# Patient Record
Sex: MALE | Race: WHITE | Employment: OTHER | URBAN - METROPOLITAN AREA
[De-identification: names, ages, dates, MRNs, and addresses within clinical notes are randomized per-mention and may not be internally consistent; named-entity substitution may affect disease eponyms.]

---

## 2017-02-20 ENCOUNTER — OFFICE VISIT (OUTPATIENT)
Dept: CARDIOLOGY CLINIC | Age: 82
End: 2017-02-20

## 2017-02-20 VITALS
SYSTOLIC BLOOD PRESSURE: 120 MMHG | OXYGEN SATURATION: 96 % | DIASTOLIC BLOOD PRESSURE: 74 MMHG | WEIGHT: 189 LBS | HEART RATE: 55 BPM | BODY MASS INDEX: 30.37 KG/M2 | HEIGHT: 66 IN

## 2017-02-20 DIAGNOSIS — N18.2 CRF (CHRONIC RENAL FAILURE), STAGE 2 (MILD): Chronic | ICD-10-CM

## 2017-02-20 DIAGNOSIS — I25.10 CORONARY ARTERY DISEASE INVOLVING NATIVE CORONARY ARTERY OF NATIVE HEART WITHOUT ANGINA PECTORIS: Primary | Chronic | ICD-10-CM

## 2017-02-20 DIAGNOSIS — I10 ESSENTIAL HYPERTENSION: Chronic | ICD-10-CM

## 2017-02-20 DIAGNOSIS — I42.9 CARDIOMYOPATHY (HCC): ICD-10-CM

## 2017-02-20 DIAGNOSIS — I35.0 AORTIC STENOSIS, MILD: ICD-10-CM

## 2017-02-20 DIAGNOSIS — Z98.61 S/P PTCA (PERCUTANEOUS TRANSLUMINAL CORONARY ANGIOPLASTY): Chronic | ICD-10-CM

## 2017-02-20 DIAGNOSIS — I71.21 ASCENDING AORTIC ANEURYSM: Chronic | ICD-10-CM

## 2017-02-20 DIAGNOSIS — E78.5 HYPERLIPIDEMIA, UNSPECIFIED HYPERLIPIDEMIA TYPE: Chronic | ICD-10-CM

## 2017-02-20 PROCEDURE — 99214 OFFICE O/P EST MOD 30 MIN: CPT | Performed by: INTERNAL MEDICINE

## 2017-03-06 ENCOUNTER — TELEPHONE (OUTPATIENT)
Dept: CARDIOLOGY CLINIC | Age: 82
End: 2017-03-06

## 2017-03-08 ENCOUNTER — OFFICE VISIT (OUTPATIENT)
Dept: CARDIOLOGY CLINIC | Age: 82
End: 2017-03-08

## 2017-03-08 VITALS
WEIGHT: 188 LBS | DIASTOLIC BLOOD PRESSURE: 84 MMHG | SYSTOLIC BLOOD PRESSURE: 134 MMHG | HEIGHT: 66 IN | BODY MASS INDEX: 30.22 KG/M2 | HEART RATE: 58 BPM

## 2017-03-08 DIAGNOSIS — I51.89 LEFT VENTRICULAR SYSTOLIC DYSFUNCTION, NYHA CLASS 3: Primary | ICD-10-CM

## 2017-03-08 PROCEDURE — 99214 OFFICE O/P EST MOD 30 MIN: CPT | Performed by: INTERNAL MEDICINE

## 2017-03-10 ENCOUNTER — TELEPHONE (OUTPATIENT)
Dept: CARDIOLOGY CLINIC | Age: 82
End: 2017-03-10

## 2017-03-12 ASSESSMENT — ENCOUNTER SYMPTOMS
EYE PAIN: 0
BACK PAIN: 0
NAUSEA: 0
BLOOD IN STOOL: 0
WHEEZING: 0
ABDOMINAL PAIN: 0
CHEST TIGHTNESS: 0
VOMITING: 0
PHOTOPHOBIA: 0
COLOR CHANGE: 0
SHORTNESS OF BREATH: 1
COUGH: 0

## 2017-03-29 ENCOUNTER — NURSE ONLY (OUTPATIENT)
Dept: CARDIOLOGY CLINIC | Age: 82
End: 2017-03-29

## 2017-03-29 VITALS
WEIGHT: 189 LBS | SYSTOLIC BLOOD PRESSURE: 136 MMHG | BODY MASS INDEX: 30.37 KG/M2 | DIASTOLIC BLOOD PRESSURE: 74 MMHG | HEIGHT: 66 IN | HEART RATE: 66 BPM

## 2017-03-29 DIAGNOSIS — Z95.810 S/P ICD (INTERNAL CARDIAC DEFIBRILLATOR) PROCEDURE: Primary | ICD-10-CM

## 2017-04-26 ENCOUNTER — TELEPHONE (OUTPATIENT)
Dept: CARDIOLOGY CLINIC | Age: 82
End: 2017-04-26

## 2017-04-27 ENCOUNTER — TELEPHONE (OUTPATIENT)
Dept: CARDIOLOGY CLINIC | Age: 82
End: 2017-04-27

## 2017-05-05 ENCOUNTER — TELEPHONE (OUTPATIENT)
Dept: CARDIOLOGY CLINIC | Age: 82
End: 2017-05-05

## 2017-05-08 ENCOUNTER — TELEPHONE (OUTPATIENT)
Dept: FAMILY MEDICINE CLINIC | Age: 82
End: 2017-05-08

## 2017-05-09 RX ORDER — CARVEDILOL 6.25 MG/1
6.25 TABLET ORAL 2 TIMES DAILY WITH MEALS
Qty: 60 TABLET | Refills: 6 | Status: SHIPPED | OUTPATIENT
Start: 2017-05-09 | End: 2017-06-14 | Stop reason: SDUPTHER

## 2017-05-16 ENCOUNTER — OFFICE VISIT (OUTPATIENT)
Dept: FAMILY MEDICINE CLINIC | Age: 82
End: 2017-05-16

## 2017-05-16 VITALS
TEMPERATURE: 96.8 F | OXYGEN SATURATION: 96 % | DIASTOLIC BLOOD PRESSURE: 82 MMHG | SYSTOLIC BLOOD PRESSURE: 130 MMHG | BODY MASS INDEX: 29.83 KG/M2 | WEIGHT: 184.8 LBS | HEART RATE: 94 BPM

## 2017-05-16 DIAGNOSIS — I49.5 SSS (SICK SINUS SYNDROME) (HCC): ICD-10-CM

## 2017-05-16 DIAGNOSIS — I25.10 CORONARY ARTERY DISEASE INVOLVING NATIVE CORONARY ARTERY OF NATIVE HEART WITHOUT ANGINA PECTORIS: Primary | ICD-10-CM

## 2017-05-16 DIAGNOSIS — Z09 HOSPITAL DISCHARGE FOLLOW-UP: ICD-10-CM

## 2017-05-16 DIAGNOSIS — I48.0 PAF (PAROXYSMAL ATRIAL FIBRILLATION) (HCC): ICD-10-CM

## 2017-05-16 PROCEDURE — 99213 OFFICE O/P EST LOW 20 MIN: CPT | Performed by: FAMILY MEDICINE

## 2017-06-14 ENCOUNTER — TELEPHONE (OUTPATIENT)
Dept: CARDIOLOGY CLINIC | Age: 82
End: 2017-06-14

## 2017-06-14 ENCOUNTER — OFFICE VISIT (OUTPATIENT)
Dept: CARDIOLOGY CLINIC | Age: 82
End: 2017-06-14

## 2017-06-14 VITALS
HEIGHT: 66 IN | BODY MASS INDEX: 29.09 KG/M2 | DIASTOLIC BLOOD PRESSURE: 82 MMHG | SYSTOLIC BLOOD PRESSURE: 120 MMHG | WEIGHT: 181 LBS

## 2017-06-14 DIAGNOSIS — Z95.810 S/P ICD (INTERNAL CARDIAC DEFIBRILLATOR) PROCEDURE: ICD-10-CM

## 2017-06-14 DIAGNOSIS — N18.30 CHRONIC RENAL FAILURE, STAGE 3 (MODERATE) (HCC): ICD-10-CM

## 2017-06-14 DIAGNOSIS — I51.9 SEVERE LEFT VENTRICULAR SYSTOLIC DYSFUNCTION: Chronic | ICD-10-CM

## 2017-06-14 DIAGNOSIS — E78.5 HYPERLIPIDEMIA, UNSPECIFIED HYPERLIPIDEMIA TYPE: Chronic | ICD-10-CM

## 2017-06-14 DIAGNOSIS — I48.0 PAF (PAROXYSMAL ATRIAL FIBRILLATION) (HCC): ICD-10-CM

## 2017-06-14 DIAGNOSIS — I10 ESSENTIAL HYPERTENSION: Chronic | ICD-10-CM

## 2017-06-14 DIAGNOSIS — I25.10 CORONARY ARTERY DISEASE INVOLVING NATIVE CORONARY ARTERY OF NATIVE HEART WITHOUT ANGINA PECTORIS: Primary | ICD-10-CM

## 2017-06-14 DIAGNOSIS — Z98.61 S/P PTCA (PERCUTANEOUS TRANSLUMINAL CORONARY ANGIOPLASTY): Chronic | ICD-10-CM

## 2017-06-14 PROCEDURE — 99214 OFFICE O/P EST MOD 30 MIN: CPT | Performed by: INTERNAL MEDICINE

## 2017-06-14 RX ORDER — CARVEDILOL 6.25 MG/1
6.25 TABLET ORAL 2 TIMES DAILY WITH MEALS
Qty: 60 TABLET | Refills: 6 | Status: SHIPPED | OUTPATIENT
Start: 2017-06-14 | End: 2018-02-19 | Stop reason: ALTCHOICE

## 2017-06-14 RX ORDER — FUROSEMIDE 20 MG/1
20 TABLET ORAL PRN
Qty: 60 TABLET | Refills: 6 | Status: ON HOLD | OUTPATIENT
Start: 2017-06-14 | End: 2017-12-13

## 2017-06-14 RX ORDER — FUROSEMIDE 20 MG/1
TABLET ORAL
Qty: 40 TABLET | Refills: 0 | Status: CANCELLED | OUTPATIENT
Start: 2017-06-14

## 2017-06-14 RX ORDER — FUROSEMIDE 20 MG/1
20 TABLET ORAL PRN
COMMUNITY
End: 2017-06-14 | Stop reason: SDUPTHER

## 2017-06-15 ENCOUNTER — TELEPHONE (OUTPATIENT)
Dept: CARDIOLOGY CLINIC | Age: 82
End: 2017-06-15

## 2017-06-22 ENCOUNTER — TELEPHONE (OUTPATIENT)
Dept: CARDIOLOGY CLINIC | Age: 82
End: 2017-06-22

## 2017-06-29 ENCOUNTER — TELEPHONE (OUTPATIENT)
Dept: CARDIOLOGY CLINIC | Age: 82
End: 2017-06-29

## 2017-06-30 RX ORDER — ATORVASTATIN CALCIUM 80 MG/1
80 TABLET, FILM COATED ORAL DAILY
Qty: 90 TABLET | Refills: 3 | Status: ON HOLD | OUTPATIENT
Start: 2017-06-30 | End: 2017-12-13 | Stop reason: HOSPADM

## 2017-06-30 RX ORDER — ATORVASTATIN CALCIUM 80 MG/1
80 TABLET, FILM COATED ORAL NIGHTLY
Qty: 90 TABLET | Refills: 3 | Status: CANCELLED | OUTPATIENT
Start: 2017-06-30

## 2017-07-18 RX ORDER — ISOSORBIDE MONONITRATE 60 MG/1
60 TABLET, EXTENDED RELEASE ORAL DAILY
Qty: 30 TABLET | Refills: 6 | Status: ON HOLD | OUTPATIENT
Start: 2017-07-18 | End: 2018-02-13 | Stop reason: HOSPADM

## 2017-07-18 RX ORDER — LISINOPRIL 2.5 MG/1
2.5 TABLET ORAL DAILY
Qty: 30 TABLET | Refills: 6 | Status: ON HOLD | OUTPATIENT
Start: 2017-07-18 | End: 2018-02-12 | Stop reason: ALTCHOICE

## 2017-09-06 ENCOUNTER — OFFICE VISIT (OUTPATIENT)
Dept: CARDIOLOGY CLINIC | Age: 82
End: 2017-09-06

## 2017-09-06 VITALS
HEART RATE: 80 BPM | WEIGHT: 184.4 LBS | BODY MASS INDEX: 29.63 KG/M2 | HEIGHT: 66 IN | DIASTOLIC BLOOD PRESSURE: 86 MMHG | SYSTOLIC BLOOD PRESSURE: 130 MMHG

## 2017-09-06 DIAGNOSIS — E78.5 HYPERLIPIDEMIA, UNSPECIFIED HYPERLIPIDEMIA TYPE: Chronic | ICD-10-CM

## 2017-09-06 DIAGNOSIS — I71.21 ASCENDING AORTIC ANEURYSM: Chronic | ICD-10-CM

## 2017-09-06 DIAGNOSIS — I10 ESSENTIAL HYPERTENSION: Chronic | ICD-10-CM

## 2017-09-06 DIAGNOSIS — Z95.810 BIVENTRICULAR AUTOMATIC IMPLANTABLE CARDIOVERTER DEFIBRILLATOR IN SITU: ICD-10-CM

## 2017-09-06 DIAGNOSIS — I51.9 SEVERE LEFT VENTRICULAR SYSTOLIC DYSFUNCTION: Chronic | ICD-10-CM

## 2017-09-06 DIAGNOSIS — N28.9 RENAL INSUFFICIENCY: ICD-10-CM

## 2017-09-06 DIAGNOSIS — I25.10 CORONARY ARTERY DISEASE INVOLVING NATIVE CORONARY ARTERY OF NATIVE HEART WITHOUT ANGINA PECTORIS: Primary | ICD-10-CM

## 2017-09-06 DIAGNOSIS — Z98.61 S/P PTCA (PERCUTANEOUS TRANSLUMINAL CORONARY ANGIOPLASTY): Chronic | ICD-10-CM

## 2017-09-06 DIAGNOSIS — I48.0 PAF (PAROXYSMAL ATRIAL FIBRILLATION) (HCC): ICD-10-CM

## 2017-09-06 PROCEDURE — 99214 OFFICE O/P EST MOD 30 MIN: CPT | Performed by: INTERNAL MEDICINE

## 2017-09-06 RX ORDER — OMEPRAZOLE MAGNESIUM 20 MG
CAPSULE,DELAYED RELEASE (ENTERIC COATED) ORAL
Refills: 3 | COMMUNITY
Start: 2017-08-02 | End: 2017-09-06 | Stop reason: SDUPTHER

## 2017-09-06 RX ORDER — PANTOPRAZOLE SODIUM 40 MG/1
TABLET, DELAYED RELEASE ORAL
Refills: 3 | COMMUNITY
Start: 2017-08-02 | End: 2017-09-06 | Stop reason: SDUPTHER

## 2017-09-07 RX ORDER — OMEPRAZOLE MAGNESIUM 20 MG
CAPSULE,DELAYED RELEASE (ENTERIC COATED) ORAL
Qty: 30 TABLET | Refills: 5 | Status: ON HOLD | OUTPATIENT
Start: 2017-09-07 | End: 2018-12-01

## 2017-09-07 RX ORDER — PANTOPRAZOLE SODIUM 40 MG/1
TABLET, DELAYED RELEASE ORAL
Qty: 30 TABLET | Refills: 5 | Status: SHIPPED | OUTPATIENT
Start: 2017-09-07 | End: 2018-05-25 | Stop reason: ALTCHOICE

## 2017-09-18 ENCOUNTER — PROCEDURE VISIT (OUTPATIENT)
Dept: CARDIOLOGY CLINIC | Age: 82
End: 2017-09-18

## 2017-09-18 DIAGNOSIS — Z95.810 BIVENTRICULAR IMPLANTABLE CARDIOVERTER-DEFIBRILLATOR IN SITU: Primary | ICD-10-CM

## 2017-09-18 PROCEDURE — 93296 REM INTERROG EVL PM/IDS: CPT | Performed by: INTERNAL MEDICINE

## 2017-09-18 PROCEDURE — 93297 REM INTERROG DEV EVAL ICPMS: CPT | Performed by: INTERNAL MEDICINE

## 2017-09-18 PROCEDURE — 93295 DEV INTERROG REMOTE 1/2/MLT: CPT | Performed by: INTERNAL MEDICINE

## 2017-10-23 RX ORDER — HYDRALAZINE HYDROCHLORIDE 25 MG/1
25 TABLET, FILM COATED ORAL EVERY 12 HOURS SCHEDULED
Qty: 90 TABLET | Refills: 3 | Status: ON HOLD | OUTPATIENT
Start: 2017-10-23 | End: 2018-02-13 | Stop reason: HOSPADM

## 2017-12-11 PROBLEM — I50.43 CHF (CONGESTIVE HEART FAILURE), NYHA CLASS I, ACUTE ON CHRONIC, COMBINED (HCC): Status: ACTIVE | Noted: 2017-12-11

## 2017-12-14 ENCOUNTER — OFFICE VISIT (OUTPATIENT)
Dept: FAMILY MEDICINE CLINIC | Age: 82
End: 2017-12-14

## 2017-12-14 VITALS
OXYGEN SATURATION: 92 % | SYSTOLIC BLOOD PRESSURE: 102 MMHG | BODY MASS INDEX: 28.45 KG/M2 | HEART RATE: 69 BPM | DIASTOLIC BLOOD PRESSURE: 82 MMHG | HEIGHT: 66 IN | WEIGHT: 177 LBS

## 2017-12-14 DIAGNOSIS — R09.89 CARDIAC LV EJECTION FRACTION 10-20%: ICD-10-CM

## 2017-12-14 DIAGNOSIS — I50.43 CHF (CONGESTIVE HEART FAILURE), NYHA CLASS I, ACUTE ON CHRONIC, COMBINED (HCC): Primary | ICD-10-CM

## 2017-12-14 DIAGNOSIS — N18.30 CKD (CHRONIC KIDNEY DISEASE) STAGE 3, GFR 30-59 ML/MIN (HCC): ICD-10-CM

## 2017-12-14 DIAGNOSIS — F51.01 PRIMARY INSOMNIA: ICD-10-CM

## 2017-12-14 DIAGNOSIS — Z91.81 AT HIGH RISK FOR FALLS: ICD-10-CM

## 2017-12-14 DIAGNOSIS — Z09 HOSPITAL DISCHARGE FOLLOW-UP: ICD-10-CM

## 2017-12-14 PROCEDURE — 99496 TRANSJ CARE MGMT HIGH F2F 7D: CPT | Performed by: FAMILY MEDICINE

## 2017-12-14 RX ORDER — TRAZODONE HYDROCHLORIDE 50 MG/1
50 TABLET ORAL NIGHTLY
Qty: 30 TABLET | Refills: 5 | Status: SHIPPED | OUTPATIENT
Start: 2017-12-14 | End: 2018-02-19

## 2017-12-15 ASSESSMENT — PATIENT HEALTH QUESTIONNAIRE - PHQ9
1. LITTLE INTEREST OR PLEASURE IN DOING THINGS: 0
SUM OF ALL RESPONSES TO PHQ9 QUESTIONS 1 & 2: 0
2. FEELING DOWN, DEPRESSED OR HOPELESS: 0
SUM OF ALL RESPONSES TO PHQ QUESTIONS 1-9: 0

## 2017-12-15 NOTE — PROGRESS NOTES
Post-Discharge Transitional Care Management Services      Trace Orozco   YOB: 1933    Date of Visit:  12/14/2017  30 Day Post-Discharge Date: 1/12/18    Allergies   Allergen Reactions    Pcn [Penicillins] Hives    Plavix [Clopidogrel]      Blood in stool     Outpatient Prescriptions Marked as Taking for the 12/14/17 encounter (Office Visit) with Saida Combs MD   Medication Sig Dispense Refill    traZODone (DESYREL) 50 MG tablet Take 1 tablet by mouth nightly 30 tablet 5         Vitals:    12/14/17 1500   BP: 102/82   Site: Left Arm   Position: Sitting   Cuff Size: Medium Adult   Pulse: 69   SpO2: 92%   Weight: 177 lb (80.3 kg)   Height: 5' 6\" (1.676 m)     Body mass index is 28.57 kg/m². Wt Readings from Last 3 Encounters:   12/14/17 177 lb (80.3 kg)   12/13/17 169 lb 14.4 oz (77.1 kg)   09/06/17 184 lb 6.4 oz (83.6 kg)     BP Readings from Last 3 Encounters:   12/14/17 102/82   12/13/17 120/80   09/06/17 130/86        Patient was admitted to [facility] from 12/11/17 to 12/13/17 for NSTEMI. Inpatient course: Discharge summary reviewed- see chart. Current status: improved    Review of Systems:  A comprehensive review of systems was negative except for what was noted in the HPI.     Physical Exam:  General Appearance: alert and oriented to person, place and time, well developed and well- nourished, in no acute distress  Skin: warm and dry, no rash or erythema  Head: normocephalic and atraumatic  Eyes: pupils equal, round, and reactive to light, extraocular eye movements intact, conjunctivae normal  ENT: tympanic membrane, external ear and ear canal normal bilaterally, nose without deformity, nasal mucosa and turbinates normal without polyps  Neck: supple and non-tender without mass, no thyromegaly or thyroid nodules, no cervical lymphadenopathy  Pulmonary/Chest: clear to auscultation bilaterally- no wheezes, rales or rhonchi, normal air movement, no respiratory

## 2017-12-22 ENCOUNTER — NURSE ONLY (OUTPATIENT)
Dept: FAMILY MEDICINE CLINIC | Age: 82
End: 2017-12-22

## 2017-12-22 DIAGNOSIS — N18.30 CKD (CHRONIC KIDNEY DISEASE) STAGE 3, GFR 30-59 ML/MIN (HCC): ICD-10-CM

## 2017-12-22 DIAGNOSIS — I50.43 CHF (CONGESTIVE HEART FAILURE), NYHA CLASS I, ACUTE ON CHRONIC, COMBINED (HCC): ICD-10-CM

## 2017-12-22 LAB
BUN / CREAT RATIO: 14 (CALC) (ref 7–25)
BUN BLDV-MCNC: 30 MG/DL (ref 3–29)
CALCIUM SERPL-MCNC: 9.7 MG/DL (ref 8.5–10.5)
CHLORIDE BLD-SCNC: 100 MEQ/L (ref 96–110)
CO2: 18 MEQ/L (ref 19–32)
COPY(IES) SENT TO:: NORMAL
CREAT SERPL-MCNC: 2.2 MG/DL
GFR SERPL CREATININE-BSD FRML MDRD: 27 ML/MIN/1.73M2
GLUCOSE BLD-MCNC: 82 MG/DL
POTASSIUM SERPL-SCNC: ABNORMAL MEQ/L
SODIUM BLD-SCNC: 141 MEQ/L (ref 135–148)

## 2017-12-22 PROCEDURE — 36415 COLL VENOUS BLD VENIPUNCTURE: CPT | Performed by: FAMILY MEDICINE

## 2017-12-26 ENCOUNTER — TELEPHONE (OUTPATIENT)
Dept: CARDIOLOGY CLINIC | Age: 82
End: 2017-12-26

## 2018-02-09 PROBLEM — I50.23 ACUTE ON CHRONIC SYSTOLIC HEART FAILURE (HCC): Status: ACTIVE | Noted: 2018-02-09

## 2018-02-10 PROBLEM — I50.21 ACUTE SYSTOLIC HEART FAILURE (HCC): Status: ACTIVE | Noted: 2018-02-09

## 2018-02-19 ENCOUNTER — OFFICE VISIT (OUTPATIENT)
Dept: FAMILY MEDICINE CLINIC | Age: 83
End: 2018-02-19

## 2018-02-19 VITALS
RESPIRATION RATE: 14 BRPM | TEMPERATURE: 97.5 F | HEIGHT: 66 IN | HEART RATE: 87 BPM | SYSTOLIC BLOOD PRESSURE: 112 MMHG | BODY MASS INDEX: 28.77 KG/M2 | DIASTOLIC BLOOD PRESSURE: 78 MMHG | OXYGEN SATURATION: 95 % | WEIGHT: 179 LBS

## 2018-02-19 DIAGNOSIS — Z09 HOSPITAL DISCHARGE FOLLOW-UP: ICD-10-CM

## 2018-02-19 DIAGNOSIS — F51.01 PRIMARY INSOMNIA: Primary | ICD-10-CM

## 2018-02-19 DIAGNOSIS — D50.9 MICROCYTIC ANEMIA: ICD-10-CM

## 2018-02-19 LAB
% SATURATION: 12 % (ref 12–57)
COPY(IES) SENT TO:: NORMAL
IRON: 35 MCG/DL (ref 40–190)
RETICULOCYTE ABSOLUTE COUNT: 66 K/MM3 (ref 25–90)
RETICULOCYTE COUNT PCT: 1.4 %
TOTAL IRON BINDING CAPACITY: 296 MCG/DL (ref 200–450)

## 2018-02-19 PROCEDURE — 1111F DSCHRG MED/CURRENT MED MERGE: CPT | Performed by: FAMILY MEDICINE

## 2018-02-19 PROCEDURE — 99214 OFFICE O/P EST MOD 30 MIN: CPT | Performed by: FAMILY MEDICINE

## 2018-02-19 RX ORDER — ZOLPIDEM TARTRATE 5 MG/1
5 TABLET ORAL NIGHTLY PRN
Qty: 30 TABLET | Refills: 0 | Status: SHIPPED | OUTPATIENT
Start: 2018-02-19 | End: 2018-02-20 | Stop reason: ALTCHOICE

## 2018-02-20 ENCOUNTER — OFFICE VISIT (OUTPATIENT)
Dept: CARDIOLOGY CLINIC | Age: 83
End: 2018-02-20

## 2018-02-20 VITALS
WEIGHT: 177.8 LBS | DIASTOLIC BLOOD PRESSURE: 80 MMHG | BODY MASS INDEX: 28.57 KG/M2 | HEIGHT: 66 IN | HEART RATE: 80 BPM | SYSTOLIC BLOOD PRESSURE: 126 MMHG

## 2018-02-20 DIAGNOSIS — I25.10 CORONARY ARTERY DISEASE INVOLVING NATIVE CORONARY ARTERY OF NATIVE HEART WITHOUT ANGINA PECTORIS: Primary | ICD-10-CM

## 2018-02-20 PROCEDURE — 99214 OFFICE O/P EST MOD 30 MIN: CPT | Performed by: INTERNAL MEDICINE

## 2018-02-20 RX ORDER — CARVEDILOL 6.25 MG/1
6.25 TABLET ORAL 2 TIMES DAILY WITH MEALS
COMMUNITY
End: 2018-04-26 | Stop reason: SDUPTHER

## 2018-02-20 RX ORDER — ASPIRIN 81 MG/1
81 TABLET ORAL DAILY
Qty: 30 TABLET | Refills: 3 | Status: SHIPPED | OUTPATIENT
Start: 2018-02-20 | End: 2020-01-18

## 2018-02-20 ASSESSMENT — PATIENT HEALTH QUESTIONNAIRE - PHQ9
SUM OF ALL RESPONSES TO PHQ QUESTIONS 1-9: 0
SUM OF ALL RESPONSES TO PHQ9 QUESTIONS 1 & 2: 0
1. LITTLE INTEREST OR PLEASURE IN DOING THINGS: 0
2. FEELING DOWN, DEPRESSED OR HOPELESS: 0

## 2018-02-20 NOTE — PROGRESS NOTES
swollen lymph nodes  · Allergic/Immunologic: No nasal congestion or hives  All systems negative except as marked. Objective:  /80   Pulse 80   Ht 5' 6\" (1.676 m)   Wt 177 lb 12.8 oz (80.6 kg)   BMI 28.70 kg/m²   Wt Readings from Last 3 Encounters:   02/20/18 177 lb 12.8 oz (80.6 kg)   02/19/18 179 lb (81.2 kg)   02/13/18 175 lb 4.8 oz (79.5 kg)     Body mass index is 28.7 kg/m². GENERAL - Alert, oriented, pleasant, in no apparent distress,normal grooming  HEENT  pupils are reactive to light and accomodation, cornea intact, conjunctive normal color, ears are normal in exam,throat exam in normal, teeth, gum and palate are normal, oral mucosa is normal without any notation of pallor or cyanosis  Neck - Supple. No jugular venous distention noted. No carotid bruits, no apical -carotid delay  Respiratory:  Normal breath sounds, No respiratory distress, No wheezing, No chest tenderness. ,no use of accessory muscles, diaphragm movement is normal  Cardiovascular: (PMI) apex non displaced,no lifts no thrills, no s3,no s4, Normal heart rate, Normal rhythm, No murmurs, No rubs, No gallops. Carotid arteries pulse and amplitude are normal no bruit, no abdominal bruit noted ( normal abdominal aorta ausculation), femoral arteries pulse and amplitude are normal no bruit, pedal pulses are normal  Femoral pulses have normal amplitude, no bruits   Extremities - No cyanosis, clubbing, or significant edema, no varicose veins    Abdomen  No masses, tenderness, or organomegaly, no hepato-splenomegally, no bruits  Musculoskeletal  No significant edema, no kyphosis or scoliosis, no deformity in any extremity noted, muscle strength and tone are normal  Skin: no ulcer,no scar,no stasis dermatitis   Neurologic  alert oriented times 3,Cranial nerves II through XII are grossly intact. There were no gross focal neurologic abnormalities.  All sensory and motor nerves examined and were normal  Psychiatric: normal mood and

## 2018-02-26 ENCOUNTER — OFFICE VISIT (OUTPATIENT)
Dept: FAMILY MEDICINE CLINIC | Age: 83
End: 2018-02-26

## 2018-02-26 VITALS
WEIGHT: 179.4 LBS | DIASTOLIC BLOOD PRESSURE: 82 MMHG | TEMPERATURE: 96.8 F | BODY MASS INDEX: 28.96 KG/M2 | SYSTOLIC BLOOD PRESSURE: 132 MMHG | HEART RATE: 98 BPM | OXYGEN SATURATION: 95 %

## 2018-02-26 DIAGNOSIS — I10 ESSENTIAL HYPERTENSION: ICD-10-CM

## 2018-02-26 DIAGNOSIS — J06.9 VIRAL URI: Primary | ICD-10-CM

## 2018-02-26 PROBLEM — I50.23 ACUTE ON CHRONIC SYSTOLIC HEART FAILURE (HCC): Status: ACTIVE | Noted: 2018-02-26

## 2018-02-26 LAB
INFLUENZA A ANTIBODY: NEGATIVE
INFLUENZA B ANTIBODY: NEGATIVE

## 2018-02-26 PROCEDURE — 99214 OFFICE O/P EST MOD 30 MIN: CPT | Performed by: FAMILY MEDICINE

## 2018-02-26 PROCEDURE — 87804 INFLUENZA ASSAY W/OPTIC: CPT | Performed by: FAMILY MEDICINE

## 2018-02-26 RX ORDER — BENZONATATE 200 MG/1
200 CAPSULE ORAL 3 TIMES DAILY PRN
Qty: 30 CAPSULE | Refills: 0 | Status: SHIPPED | OUTPATIENT
Start: 2018-02-26 | End: 2018-02-26 | Stop reason: SDUPTHER

## 2018-02-26 RX ORDER — BENZONATATE 200 MG/1
200 CAPSULE ORAL 3 TIMES DAILY PRN
Qty: 30 CAPSULE | Refills: 0 | Status: SHIPPED | OUTPATIENT
Start: 2018-02-26 | End: 2018-05-01 | Stop reason: ALTCHOICE

## 2018-03-12 ENCOUNTER — CARE COORDINATION (OUTPATIENT)
Dept: FAMILY MEDICINE CLINIC | Age: 83
End: 2018-03-12

## 2018-03-12 NOTE — CARE COORDINATION
Received referral from Araceli Singer CM to work with pt re: CHF zone management. Call to introduce Care Coordination. Left message with contact information & requested a call back. Will also mail introduction letter this date.   Zoila Blanchard RN  Nurse Care Coordinator  [phone]

## 2018-03-14 DIAGNOSIS — F51.01 PRIMARY INSOMNIA: ICD-10-CM

## 2018-03-15 RX ORDER — ZOLPIDEM TARTRATE 5 MG/1
5 TABLET ORAL NIGHTLY PRN
Qty: 30 TABLET | Refills: 0 | Status: SHIPPED | OUTPATIENT
Start: 2018-03-15 | End: 2018-05-01 | Stop reason: SDUPTHER

## 2018-03-22 ENCOUNTER — CARE COORDINATION (OUTPATIENT)
Dept: FAMILY MEDICINE CLINIC | Age: 83
End: 2018-03-22

## 2018-03-22 NOTE — CARE COORDINATION
Attempted to contact pt to introduce the Care Coordination program.  Left message with contact information & requested call back.    Talha King RN  Nurse Care Coordinator  [phone]

## 2018-03-29 ENCOUNTER — CARE COORDINATION (OUTPATIENT)
Dept: FAMILY MEDICINE CLINIC | Age: 83
End: 2018-03-29

## 2018-03-29 NOTE — CARE COORDINATION
Attempted outreach to engage pt with Care Coordination. There is no voicemail available to leave a message.   Fredi Urais RN  Nurse Care Coordinator  [phone]

## 2018-04-03 ENCOUNTER — TELEPHONE (OUTPATIENT)
Dept: CARDIOLOGY CLINIC | Age: 83
End: 2018-04-03

## 2018-04-09 ENCOUNTER — CARE COORDINATION (OUTPATIENT)
Dept: FAMILY MEDICINE CLINIC | Age: 83
End: 2018-04-09

## 2018-04-26 ENCOUNTER — TELEPHONE (OUTPATIENT)
Dept: FAMILY MEDICINE CLINIC | Age: 83
End: 2018-04-26

## 2018-04-26 DIAGNOSIS — F51.01 PRIMARY INSOMNIA: ICD-10-CM

## 2018-04-26 RX ORDER — CARVEDILOL 6.25 MG/1
6.25 TABLET ORAL 2 TIMES DAILY WITH MEALS
Qty: 180 TABLET | Refills: 3 | Status: ON HOLD | OUTPATIENT
Start: 2018-04-26 | End: 2018-08-04

## 2018-05-01 ENCOUNTER — OFFICE VISIT (OUTPATIENT)
Dept: FAMILY MEDICINE CLINIC | Age: 83
End: 2018-05-01

## 2018-05-01 VITALS
WEIGHT: 173.4 LBS | OXYGEN SATURATION: 99 % | HEART RATE: 88 BPM | BODY MASS INDEX: 27.99 KG/M2 | TEMPERATURE: 97.3 F | DIASTOLIC BLOOD PRESSURE: 70 MMHG | SYSTOLIC BLOOD PRESSURE: 122 MMHG

## 2018-05-01 DIAGNOSIS — F51.01 PRIMARY INSOMNIA: ICD-10-CM

## 2018-05-01 PROCEDURE — 99213 OFFICE O/P EST LOW 20 MIN: CPT | Performed by: FAMILY MEDICINE

## 2018-05-01 RX ORDER — ZOLPIDEM TARTRATE 10 MG/1
10 TABLET ORAL NIGHTLY PRN
Qty: 30 TABLET | Refills: 5 | Status: SHIPPED | OUTPATIENT
Start: 2018-05-01 | End: 2018-05-25 | Stop reason: ALTCHOICE

## 2018-05-01 ASSESSMENT — PATIENT HEALTH QUESTIONNAIRE - PHQ9
1. LITTLE INTEREST OR PLEASURE IN DOING THINGS: 0
SUM OF ALL RESPONSES TO PHQ QUESTIONS 1-9: 0
SUM OF ALL RESPONSES TO PHQ9 QUESTIONS 1 & 2: 0
2. FEELING DOWN, DEPRESSED OR HOPELESS: 0

## 2018-05-25 ENCOUNTER — OFFICE VISIT (OUTPATIENT)
Dept: CARDIOLOGY CLINIC | Age: 83
End: 2018-05-25

## 2018-05-25 VITALS
DIASTOLIC BLOOD PRESSURE: 88 MMHG | HEART RATE: 76 BPM | BODY MASS INDEX: 28.06 KG/M2 | WEIGHT: 174.6 LBS | HEIGHT: 66 IN | SYSTOLIC BLOOD PRESSURE: 118 MMHG

## 2018-05-25 DIAGNOSIS — I25.10 CORONARY ARTERY DISEASE INVOLVING NATIVE CORONARY ARTERY OF NATIVE HEART WITHOUT ANGINA PECTORIS: Primary | ICD-10-CM

## 2018-05-25 PROCEDURE — 99214 OFFICE O/P EST MOD 30 MIN: CPT | Performed by: INTERNAL MEDICINE

## 2018-07-02 ENCOUNTER — OFFICE VISIT (OUTPATIENT)
Dept: FAMILY MEDICINE CLINIC | Age: 83
End: 2018-07-02

## 2018-07-02 VITALS
BODY MASS INDEX: 28.08 KG/M2 | SYSTOLIC BLOOD PRESSURE: 122 MMHG | DIASTOLIC BLOOD PRESSURE: 78 MMHG | WEIGHT: 174 LBS | HEART RATE: 68 BPM | TEMPERATURE: 96.9 F | OXYGEN SATURATION: 97 %

## 2018-07-02 DIAGNOSIS — F51.01 PRIMARY INSOMNIA: ICD-10-CM

## 2018-07-02 DIAGNOSIS — Z91.81 AT HIGH RISK FOR FALLS: ICD-10-CM

## 2018-07-02 DIAGNOSIS — R07.89 RIGHT-SIDED CHEST WALL PAIN: Primary | ICD-10-CM

## 2018-07-02 PROCEDURE — 99214 OFFICE O/P EST MOD 30 MIN: CPT | Performed by: FAMILY MEDICINE

## 2018-07-02 RX ORDER — ZOLPIDEM TARTRATE 10 MG/1
10 TABLET ORAL NIGHTLY PRN
Qty: 30 TABLET | Refills: 5 | Status: SHIPPED | OUTPATIENT
Start: 2018-07-02 | End: 2019-12-10 | Stop reason: SDUPTHER

## 2018-07-02 RX ORDER — BACLOFEN 10 MG/1
10 TABLET ORAL 3 TIMES DAILY
Qty: 30 TABLET | Refills: 0 | Status: ON HOLD | OUTPATIENT
Start: 2018-07-02 | End: 2018-08-02

## 2018-07-02 RX ORDER — BACLOFEN 10 MG/1
10 TABLET ORAL 3 TIMES DAILY
Qty: 30 TABLET | Refills: 0 | Status: SHIPPED | OUTPATIENT
Start: 2018-07-02 | End: 2018-07-02 | Stop reason: SDUPTHER

## 2018-07-02 ASSESSMENT — PATIENT HEALTH QUESTIONNAIRE - PHQ9
1. LITTLE INTEREST OR PLEASURE IN DOING THINGS: 0
2. FEELING DOWN, DEPRESSED OR HOPELESS: 0
SUM OF ALL RESPONSES TO PHQ QUESTIONS 1-9: 0
SUM OF ALL RESPONSES TO PHQ9 QUESTIONS 1 & 2: 0

## 2018-07-02 NOTE — PATIENT INSTRUCTIONS
Patient Education        Musculoskeletal Chest Pain: Care Instructions  Your Care Instructions    Chest pain is not always a sign that something is wrong with your heart or that you have another serious problem. The doctor thinks your chest pain is caused by strained muscles or ligaments, inflamed chest cartilage, or another problem in your chest, rather than by your heart. You may need more tests to find the cause of your chest pain. Follow-up care is a key part of your treatment and safety. Be sure to make and go to all appointments, and call your doctor if you are having problems. It's also a good idea to know your test results and keep a list of the medicines you take. How can you care for yourself at home? · Take pain medicines exactly as directed. ¨ If the doctor gave you a prescription medicine for pain, take it as prescribed. ¨ If you are not taking a prescription pain medicine, ask your doctor if you can take an over-the-counter medicine. · Rest and protect the sore area. · Stop, change, or take a break from any activity that may be causing your pain or soreness. · Put ice or a cold pack on the sore area for 10 to 20 minutes at a time. Try to do this every 1 to 2 hours for the next 3 days (when you are awake) or until the swelling goes down. Put a thin cloth between the ice and your skin. · After 2 or 3 days, apply a heating pad set on low or a warm cloth to the area that hurts. Some doctors suggest that you go back and forth between hot and cold. · Do not wrap or tape your ribs for support. This may cause you to take smaller breaths, which could increase your risk of lung problems. · Mentholated creams such as Bengay or Icy Hot may soothe sore muscles. Follow the instructions on the package. · Follow your doctor's instructions for exercising. · Gentle stretching and massage may help you get better faster.  Stretch slowly to the point just before pain begins, and hold the stretch for at least 15 to 30 seconds. Do this 3 or 4 times a day. Stretch just after you have applied heat. · As your pain gets better, slowly return to your normal activities. Any increased pain may be a sign that you need to rest a while longer. When should you call for help? Call 911 anytime you think you may need emergency care. For example, call if:    · You have chest pain or pressure. This may occur with:  ¨ Sweating. ¨ Shortness of breath. ¨ Nausea or vomiting. ¨ Pain that spreads from the chest to the neck, jaw, or one or both shoulders or arms. ¨ Dizziness or lightheadedness. ¨ A fast or uneven pulse. After calling 911, chew 1 adult-strength aspirin. Wait for an ambulance. Do not try to drive yourself.     · You have sudden chest pain and shortness of breath, or you cough up blood.    Call your doctor now or seek immediate medical care if:    · You have any trouble breathing.     · Your chest pain gets worse.     · Your chest pain occurs consistently with exercise and is relieved by rest.    Watch closely for changes in your health, and be sure to contact your doctor if:    · Your chest pain does not get better after 1 week. Where can you learn more? Go to https://[employer]. org and sign in to your [employer] account. Enter 1850 2105 in the [facility] box to learn more about \"Musculoskeletal Chest Pain: Care Instructions. \"     If you do not have an account, please click on the \"Sign Up Now\" link. Current as of: November 20, 2017  Content Version: 11.6  © 0047-0964 [employer]. Care instructions adapted under license by [facility] ([facility]). If you have questions about a medical condition or this instruction, always ask your healthcare professional. [place] [address] any warranty or liability for your use of this information.

## 2018-07-02 NOTE — PROGRESS NOTES
catheterization 4/29/2016    LAD 50% in stent re-stenosis, CX/RCA/LM no significant diaease.  Hyperlipidemia     Hypertension     PAF (paroxysmal atrial fibrillation) (HCC)     Shingles      Past Surgical History:   Procedure Laterality Date    CARDIAC DEFIBRILLATOR PLACEMENT Left 03/20/2017    BiV/ICD     PTCA  8/13/98    stent LAD, Diag. branch    TOTAL KNEE ARTHROPLASTY  [id number]       O:   Vitals:    07/02/18 1533   BP: 122/78   Pulse: 68   Temp: 96.9 °F (36.1 °C)   SpO2: 97%     No acute distress. Alert and Oriented x 3  HEENT: Atraumatic. Normocephalic. PERRLA, EOMI, Conjunctiva clear  NECK: without thyromegaly, lymphadenopathy, JVD  LUNGS:Clear to ascultation bilaterally. Breathing comfortably  CARDIOVASCULAR:  Regular rate and rhythm, no murmurs, rubs, or gallops  Chest: Tenderness between third and fifth ribs on right laterally. EXTREMITY: Full range of motion. No clubbing/cyanosis/edema  NEURO: Cranial nerves II-XII grossly intact. Strength 5/5, DTR 2/4. SKIN: Warm, Dry, No rash. PSYCH: Mood and Affect normal.    A:    Diagnosis Orders   1. Right-sided chest wall pain  XR CHEST STANDARD (2 VW)    baclofen (LIORESAL) 10 MG tablet    DISCONTINUED: baclofen (LIORESAL) 10 MG tablet   2. Primary insomnia  zolpidem (AMBIEN) 10 MG tablet     P: see orders    On the basis of positive falls risk screening, assessment and plan is as follows: home safety tips provided.

## 2018-07-09 ENCOUNTER — TELEPHONE (OUTPATIENT)
Dept: CARDIOLOGY CLINIC | Age: 83
End: 2018-07-09

## 2018-08-02 PROBLEM — I50.23 ACUTE ON CHRONIC SYSTOLIC CHF (CONGESTIVE HEART FAILURE) (HCC): Status: ACTIVE | Noted: 2018-08-02

## 2018-08-23 ENCOUNTER — TELEPHONE (OUTPATIENT)
Dept: FAMILY MEDICINE CLINIC | Age: 83
End: 2018-08-23

## 2018-09-11 RX ORDER — FUROSEMIDE 40 MG/1
40 TABLET ORAL 2 TIMES DAILY
Qty: 90 TABLET | Refills: 3 | OUTPATIENT
Start: 2018-09-11

## 2018-09-12 ENCOUNTER — OFFICE VISIT (OUTPATIENT)
Dept: CARDIOLOGY CLINIC | Age: 83
End: 2018-09-12

## 2018-09-12 ENCOUNTER — HOSPITAL ENCOUNTER (OUTPATIENT)
Dept: GENERAL RADIOLOGY | Age: 83
Discharge: HOME | End: 2018-09-12
Attending: NURSE PRACTITIONER | Admitting: NURSE PRACTITIONER

## 2018-09-12 VITALS
HEART RATE: 80 BPM | DIASTOLIC BLOOD PRESSURE: 88 MMHG | BODY MASS INDEX: 26.71 KG/M2 | WEIGHT: 166.2 LBS | SYSTOLIC BLOOD PRESSURE: 126 MMHG | HEIGHT: 66 IN

## 2018-09-12 DIAGNOSIS — Z95.810 BIVENTRICULAR IMPLANTABLE CARDIOVERTER-DEFIBRILLATOR IN SITU: ICD-10-CM

## 2018-09-12 DIAGNOSIS — I25.5 ISCHEMIC CARDIOMYOPATHY: Chronic | ICD-10-CM

## 2018-09-12 DIAGNOSIS — R79.89 ELEVATED SERUM CREATININE: Primary | ICD-10-CM

## 2018-09-12 DIAGNOSIS — E78.5 HYPERLIPIDEMIA, UNSPECIFIED HYPERLIPIDEMIA TYPE: Chronic | ICD-10-CM

## 2018-09-12 DIAGNOSIS — I25.10 CORONARY ARTERY DISEASE INVOLVING NATIVE CORONARY ARTERY OF NATIVE HEART WITHOUT ANGINA PECTORIS: Chronic | ICD-10-CM

## 2018-09-12 DIAGNOSIS — I10 ESSENTIAL HYPERTENSION: Chronic | ICD-10-CM

## 2018-09-12 DIAGNOSIS — Z95.810 BIVENTRICULAR AUTOMATIC IMPLANTABLE CARDIOVERTER DEFIBRILLATOR IN SITU: ICD-10-CM

## 2018-09-12 DIAGNOSIS — I51.9 SEVERE LEFT VENTRICULAR SYSTOLIC DYSFUNCTION: Chronic | ICD-10-CM

## 2018-09-12 LAB
ANION GAP SERPL CALCULATED.3IONS-SCNC: 14 MMOL/L (ref 4–16)
BUN BLDV-MCNC: 26 MG/DL (ref 6–23)
CALCIUM SERPL-MCNC: 9.6 MG/DL (ref 8.3–10.6)
CHLORIDE BLD-SCNC: 98 MMOL/L (ref 99–110)
CO2: 25 MMOL/L (ref 21–32)
CREAT SERPL-MCNC: 2 MG/DL (ref 0.9–1.3)
GFR AFRICAN AMERICAN: 39 ML/MIN/1.73M2
GFR NON-AFRICAN AMERICAN: 32 ML/MIN/1.73M2
GLUCOSE BLD-MCNC: 99 MG/DL (ref 70–99)
POTASSIUM SERPL-SCNC: 4.4 MMOL/L (ref 3.5–5.1)
SODIUM BLD-SCNC: 137 MMOL/L (ref 135–145)

## 2018-09-12 PROCEDURE — 99214 OFFICE O/P EST MOD 30 MIN: CPT | Performed by: NURSE PRACTITIONER

## 2018-09-12 PROCEDURE — 93290 INTERROG DEV EVAL ICPMS IP: CPT | Performed by: NURSE PRACTITIONER

## 2018-09-12 PROCEDURE — 93284 PRGRMG EVAL IMPLANTABLE DFB: CPT | Performed by: NURSE PRACTITIONER

## 2018-09-12 NOTE — PROGRESS NOTES
[id number] Score for Atrial Fibrillation Stroke Risk   Risk   Factors  Component Value   C CHF Yes 1   H HTN Yes 1   A2 Age >= 76 Yes,  (80 y.o.) 2   D DM No 0   S2 Prior Stroke/TIA Yes 2   V Vascular Disease No 0   A Age 74-69 No,  (80 y.o.) 0   Sc Sex male 0    [id number]  Score  6   Score last updated 6/17/13 1:65 PM    Click here for a link to the UpToDate guideline \"Atrial Fibrillation: Anticoagulation therapy to prevent embolization    Disclaimer: Risk Score calculation is dependent on accuracy of patient problem list and past encounter diagnosis.
above   · Respiratory: No cough or wheezing and as per note above. · Gastrointestinal: No abdominal pain, appetite loss, blood in stools, constipation, diarrhea or heartburn  · Genitourinary: No dysuria, trouble voiding, or hematuria  · Musculoskeletal:  None  · Integumentary: No rash or pruritis  · Neurological: No TIA or stroke symptoms  · Psychiatric: No anxiety or depression  · Endocrine: No malaise, fatigue or temperature intolerance  · Hematologic/Lymphatic: No bleeding problems, blood clots or swollen lymph nodes  · Allergic/Immunologic: No nasal congestion or hives    Objective:      Physical Exam:  /88   Pulse 80   Ht 5' 6\" (1.676 m)   Wt 166 lb 3.2 oz (75.4 kg)   BMI 26.83 kg/m²   Wt Readings from Last 3 Encounters:   09/12/18 166 lb 3.2 oz (75.4 kg)   08/04/18 167 lb 6.4 oz (75.9 kg)   07/02/18 174 lb (78.9 kg)     Body mass index is 26.83 kg/m². GENERAL - Alert, oriented, pleasant, in no apparent distress. Head unremarkable  Eyes - Not injected conjunctiva  ENT  normal mucosa  Neck - Supple. No jugular venous distention noted. No carotid bruits. Cardiovascular  Normal S1 and + murmur appreciated, No gallop. Extremities - No cyanosis, clubbing, No edema. Pulmonary  No respiratory distress. No wheezes or rales. Clear to auscultation  Pulses: Bilateral radial and pedal pulses normal  Abdomen  no tenderness  Musculoskeletal  normal strength  Neurologic  There are no gross focal neurologic abnormalities.   Skin-  No rash  Affect- normal mood    DATA:  Lab Results   Component Value Date    TROPONINI 0.029 05/17/2014     BNP:  No results found for: BNP  PT/INR:  No results found for: [employer]  Lab Results   Component Value Date    LABA1C 5.5 09/18/2012     Lab Results   Component Value Date    CHOL 103 02/10/2018    TRIG 69 02/10/2018    HDL 39 (L) 02/10/2018    LDLCALC 71 02/13/2014    LDLDIRECT 60 02/10/2018     Lab Results   Component Value Date    ALT 6 (L) 08/02/2018    AST 10 (L)

## 2018-09-13 RX ORDER — FUROSEMIDE 40 MG/1
40 TABLET ORAL 2 TIMES DAILY
Qty: 60 TABLET | Refills: 3 | Status: ON HOLD | OUTPATIENT
Start: 2018-09-13 | End: 2018-12-02

## 2018-10-01 ENCOUNTER — TELEPHONE (OUTPATIENT)
Dept: CARDIOLOGY CLINIC | Age: 83
End: 2018-10-01

## 2018-10-02 NOTE — TELEPHONE ENCOUNTER
Patient's son returning call to Kamille Braswell.  He states he works 3rd shift so he doesn't want to keep playing phone tag. If you can try calling tomorrow around 9.

## 2018-10-07 ENCOUNTER — HOSPITAL ENCOUNTER (INPATIENT)
Age: 83
LOS: 4 days | Discharge: HOME HEALTH | DRG: 291 | End: 2018-10-11
Attending: EMERGENCY MEDICINE | Admitting: HOSPITALIST
Payer: COMMERCIAL

## 2018-10-07 ENCOUNTER — APPOINTMENT (OUTPATIENT)
Dept: GENERAL RADIOLOGY | Age: 83
DRG: 291 | End: 2018-10-07
Payer: COMMERCIAL

## 2018-10-07 DIAGNOSIS — R07.9 ACUTE CHEST PAIN: Primary | ICD-10-CM

## 2018-10-07 DIAGNOSIS — R06.00 DYSPNEA, UNSPECIFIED TYPE: ICD-10-CM

## 2018-10-07 PROBLEM — I20.0 UNSTABLE ANGINA (HCC): Status: ACTIVE | Noted: 2018-10-07

## 2018-10-07 LAB
ALBUMIN SERPL-MCNC: 3.1 GM/DL (ref 3.4–5)
ALP BLD-CCNC: 77 IU/L (ref 40–129)
ALT SERPL-CCNC: 8 U/L (ref 10–40)
ANION GAP SERPL CALCULATED.3IONS-SCNC: 14 MMOL/L (ref 4–16)
AST SERPL-CCNC: 11 IU/L (ref 15–37)
BASE EXCESS MIXED: 2 (ref 0–1.2)
BASOPHILS ABSOLUTE: 0.1 K/CU MM
BASOPHILS RELATIVE PERCENT: 1.1 % (ref 0–1)
BILIRUB SERPL-MCNC: 1.5 MG/DL (ref 0–1)
BUN BLDV-MCNC: 29 MG/DL (ref 6–23)
CALCIUM SERPL-MCNC: 8.7 MG/DL (ref 8.3–10.6)
CHLORIDE BLD-SCNC: 102 MMOL/L (ref 99–110)
CO2: 22 MMOL/L (ref 21–32)
CREAT SERPL-MCNC: 2 MG/DL (ref 0.9–1.3)
D DIMER: 321 NG/ML(DDU)
DIFFERENTIAL TYPE: ABNORMAL
EKG ATRIAL RATE: 76 BPM
EKG DIAGNOSIS: NORMAL
EKG P AXIS: 1 DEGREES
EKG P-R INTERVAL: 168 MS
EKG Q-T INTERVAL: 502 MS
EKG QRS DURATION: 214 MS
EKG QTC CALCULATION (BAZETT): 564 MS
EKG R AXIS: 249 DEGREES
EKG T AXIS: 57 DEGREES
EKG VENTRICULAR RATE: 76 BPM
EOSINOPHILS ABSOLUTE: 0.1 K/CU MM
EOSINOPHILS RELATIVE PERCENT: 1.5 % (ref 0–3)
GFR AFRICAN AMERICAN: 39 ML/MIN/1.73M2
GFR NON-AFRICAN AMERICAN: 32 ML/MIN/1.73M2
GLUCOSE BLD-MCNC: 111 MG/DL (ref 70–99)
HCO3 VENOUS: 27.3 MMOL/L (ref 19–25)
HCT VFR BLD CALC: 41.8 % (ref 42–52)
HEMOGLOBIN: 12.1 GM/DL (ref 13.5–18)
IMMATURE NEUTROPHIL %: 0.4 % (ref 0–0.43)
LACTATE: 1.2 MMOL/L (ref 0.4–2)
LYMPHOCYTES ABSOLUTE: 0.7 K/CU MM
LYMPHOCYTES RELATIVE PERCENT: 8.1 % (ref 24–44)
MCH RBC QN AUTO: 26.8 PG (ref 27–31)
MCHC RBC AUTO-ENTMCNC: 28.9 % (ref 32–36)
MCV RBC AUTO: 92.5 FL (ref 78–100)
MONOCYTES ABSOLUTE: 0.6 K/CU MM
MONOCYTES RELATIVE PERCENT: 7.4 % (ref 0–4)
NUCLEATED RBC %: 0 %
O2 SAT, VEN: 81.3 % (ref 50–70)
PCO2, VEN: 44 MMHG (ref 38–52)
PDW BLD-RTO: 17.7 % (ref 11.7–14.9)
PH VENOUS: 7.4 (ref 7.32–7.42)
PLATELET # BLD: 209 K/CU MM (ref 140–440)
PMV BLD AUTO: 9.7 FL (ref 7.5–11.1)
PO2, VEN: 46 MMHG (ref 28–48)
POTASSIUM SERPL-SCNC: 3.8 MMOL/L (ref 3.5–5.1)
PRO-BNP: ABNORMAL PG/ML
RBC # BLD: 4.52 M/CU MM (ref 4.6–6.2)
SEGMENTED NEUTROPHILS ABSOLUTE COUNT: 6.6 K/CU MM
SEGMENTED NEUTROPHILS RELATIVE PERCENT: 81.5 % (ref 36–66)
SODIUM BLD-SCNC: 138 MMOL/L (ref 135–145)
TOTAL IMMATURE NEUTOROPHIL: 0.03 K/CU MM
TOTAL NUCLEATED RBC: 0 K/CU MM
TOTAL PROTEIN: 7.1 GM/DL (ref 6.4–8.2)
TROPONIN T: 0.01 NG/ML
TROPONIN T: 0.01 NG/ML
WBC # BLD: 8.1 K/CU MM (ref 4–10.5)

## 2018-10-07 PROCEDURE — 93010 ELECTROCARDIOGRAM REPORT: CPT | Performed by: INTERNAL MEDICINE

## 2018-10-07 PROCEDURE — 99285 EMERGENCY DEPT VISIT HI MDM: CPT

## 2018-10-07 PROCEDURE — 82805 BLOOD GASES W/O2 SATURATION: CPT

## 2018-10-07 PROCEDURE — 2580000003 HC RX 258: Performed by: NURSE PRACTITIONER

## 2018-10-07 PROCEDURE — 6370000000 HC RX 637 (ALT 250 FOR IP): Performed by: NURSE PRACTITIONER

## 2018-10-07 PROCEDURE — 85379 FIBRIN DEGRADATION QUANT: CPT

## 2018-10-07 PROCEDURE — 93005 ELECTROCARDIOGRAM TRACING: CPT | Performed by: EMERGENCY MEDICINE

## 2018-10-07 PROCEDURE — 2500000003 HC RX 250 WO HCPCS: Performed by: NURSE PRACTITIONER

## 2018-10-07 PROCEDURE — 6370000000 HC RX 637 (ALT 250 FOR IP): Performed by: EMERGENCY MEDICINE

## 2018-10-07 PROCEDURE — 83880 ASSAY OF NATRIURETIC PEPTIDE: CPT

## 2018-10-07 PROCEDURE — 80053 COMPREHEN METABOLIC PANEL: CPT

## 2018-10-07 PROCEDURE — 36415 COLL VENOUS BLD VENIPUNCTURE: CPT

## 2018-10-07 PROCEDURE — 85025 COMPLETE CBC W/AUTO DIFF WBC: CPT

## 2018-10-07 PROCEDURE — 83605 ASSAY OF LACTIC ACID: CPT

## 2018-10-07 PROCEDURE — 84484 ASSAY OF TROPONIN QUANT: CPT

## 2018-10-07 PROCEDURE — 94761 N-INVAS EAR/PLS OXIMETRY MLT: CPT

## 2018-10-07 PROCEDURE — 71045 X-RAY EXAM CHEST 1 VIEW: CPT

## 2018-10-07 PROCEDURE — S0028 INJECTION, FAMOTIDINE, 20 MG: HCPCS | Performed by: NURSE PRACTITIONER

## 2018-10-07 PROCEDURE — 1200000000 HC SEMI PRIVATE

## 2018-10-07 PROCEDURE — 2700000000 HC OXYGEN THERAPY PER DAY

## 2018-10-07 RX ORDER — ACETAMINOPHEN 325 MG/1
650 TABLET ORAL EVERY 4 HOURS PRN
Status: DISCONTINUED | OUTPATIENT
Start: 2018-10-07 | End: 2018-10-11 | Stop reason: HOSPADM

## 2018-10-07 RX ORDER — ASPIRIN 81 MG/1
324 TABLET, CHEWABLE ORAL ONCE
Status: COMPLETED | OUTPATIENT
Start: 2018-10-07 | End: 2018-10-07

## 2018-10-07 RX ORDER — ASPIRIN 81 MG/1
81 TABLET ORAL DAILY
Status: DISCONTINUED | OUTPATIENT
Start: 2018-10-07 | End: 2018-10-11 | Stop reason: HOSPADM

## 2018-10-07 RX ORDER — ASPIRIN 81 MG/1
81 TABLET, CHEWABLE ORAL DAILY
Status: DISCONTINUED | OUTPATIENT
Start: 2018-10-08 | End: 2018-10-07 | Stop reason: SDUPTHER

## 2018-10-07 RX ORDER — SODIUM CHLORIDE 0.9 % (FLUSH) 0.9 %
10 SYRINGE (ML) INJECTION EVERY 12 HOURS SCHEDULED
Status: DISCONTINUED | OUTPATIENT
Start: 2018-10-07 | End: 2018-10-11 | Stop reason: HOSPADM

## 2018-10-07 RX ORDER — ONDANSETRON 2 MG/ML
4 INJECTION INTRAMUSCULAR; INTRAVENOUS EVERY 6 HOURS PRN
Status: DISCONTINUED | OUTPATIENT
Start: 2018-10-07 | End: 2018-10-11 | Stop reason: HOSPADM

## 2018-10-07 RX ORDER — FUROSEMIDE 40 MG/1
40 TABLET ORAL 2 TIMES DAILY
Status: DISCONTINUED | OUTPATIENT
Start: 2018-10-07 | End: 2018-10-08

## 2018-10-07 RX ORDER — DOCUSATE SODIUM 100 MG/1
100 CAPSULE, LIQUID FILLED ORAL 2 TIMES DAILY PRN
Status: DISCONTINUED | OUTPATIENT
Start: 2018-10-07 | End: 2018-10-11 | Stop reason: HOSPADM

## 2018-10-07 RX ORDER — NITROGLYCERIN 0.4 MG/1
0.4 TABLET SUBLINGUAL EVERY 5 MIN PRN
Status: DISCONTINUED | OUTPATIENT
Start: 2018-10-07 | End: 2018-10-11 | Stop reason: HOSPADM

## 2018-10-07 RX ORDER — SODIUM CHLORIDE 0.9 % (FLUSH) 0.9 %
10 SYRINGE (ML) INJECTION PRN
Status: DISCONTINUED | OUTPATIENT
Start: 2018-10-07 | End: 2018-10-11 | Stop reason: HOSPADM

## 2018-10-07 RX ORDER — ATORVASTATIN CALCIUM 40 MG/1
80 TABLET, FILM COATED ORAL NIGHTLY
Status: DISCONTINUED | OUTPATIENT
Start: 2018-10-07 | End: 2018-10-11 | Stop reason: HOSPADM

## 2018-10-07 RX ORDER — CARVEDILOL 12.5 MG/1
12.5 TABLET ORAL 2 TIMES DAILY WITH MEALS
Status: DISCONTINUED | OUTPATIENT
Start: 2018-10-07 | End: 2018-10-11 | Stop reason: HOSPADM

## 2018-10-07 RX ADMIN — CARVEDILOL 12.5 MG: 12.5 TABLET, FILM COATED ORAL at 18:05

## 2018-10-07 RX ADMIN — SODIUM CHLORIDE, PRESERVATIVE FREE 10 ML: 5 INJECTION INTRAVENOUS at 21:23

## 2018-10-07 RX ADMIN — APIXABAN 2.5 MG: 2.5 TABLET, FILM COATED ORAL at 21:21

## 2018-10-07 RX ADMIN — ATORVASTATIN CALCIUM 80 MG: 20 TABLET, FILM COATED ORAL at 21:21

## 2018-10-07 RX ADMIN — SACUBITRIL AND VALSARTAN 1 TABLET: 24; 26 TABLET, FILM COATED ORAL at 21:24

## 2018-10-07 RX ADMIN — FAMOTIDINE 20 MG: 10 INJECTION, SOLUTION INTRAVENOUS at 21:22

## 2018-10-07 RX ADMIN — ASPIRIN 81 MG CHEWABLE TABLET 324 MG: 81 TABLET CHEWABLE at 15:27

## 2018-10-07 RX ADMIN — FUROSEMIDE 40 MG: 40 TABLET ORAL at 18:05

## 2018-10-07 ASSESSMENT — PAIN DESCRIPTION - ONSET: ONSET: ON-GOING

## 2018-10-07 ASSESSMENT — PAIN DESCRIPTION - FREQUENCY: FREQUENCY: CONTINUOUS

## 2018-10-07 ASSESSMENT — PAIN DESCRIPTION - ORIENTATION: ORIENTATION: MID

## 2018-10-07 ASSESSMENT — PAIN SCALES - GENERAL
PAINLEVEL_OUTOF10: 0
PAINLEVEL_OUTOF10: 6

## 2018-10-07 ASSESSMENT — PAIN DESCRIPTION - DESCRIPTORS: DESCRIPTORS: PRESSURE

## 2018-10-07 ASSESSMENT — PAIN DESCRIPTION - PAIN TYPE: TYPE: ACUTE PAIN;NEUROPATHIC PAIN

## 2018-10-07 ASSESSMENT — PAIN DESCRIPTION - LOCATION: LOCATION: CHEST

## 2018-10-07 ASSESSMENT — PAIN DESCRIPTION - PROGRESSION: CLINICAL_PROGRESSION: NOT CHANGED

## 2018-10-07 NOTE — ED NOTES
[phone] paged hospitalist     Shila Lundborg  10/07/18 [phone] [place] Chrissy NP with [facility] returned call      Shila Lundborg  10/07/18 [phone]

## 2018-10-07 NOTE — H&P
History and Physical      Name:  Omari Hollis /Age/Sex: 1933  (80 y.o. male)   MRN & CSN:  [id number] & [id number] Admission Date/Time: 10/7/2018  1:02 PM   Location:   PCP: Jose Angel MD       Hospital Day: 1        Admitting Physician: Dr. Tony Mehta MD     Assessment and Plan:   Omari Hollis is a 80 y.o. male who presents with Chest Pain; Shortness of Breath; and Fatigue     Unstable angina- h/o Severe Ischemic Cardiomyopathy. S/p PTCA ICD placment (2017) Initial troponin 0.014. EKG shows AV paced. Admit to Med/Surg     Telemetry monitoring    Serial troponin level and repeat EKG in AM   Cardiology consult. Follows with Dr. Betina Spivey   Antiplatelet/BB/Statin/sl Nitro on medication regimen. Add V/Q scan for tomorrow.  HFrEF-Does not appear acutely decompensated. last echo Ejection fraction < 25 %. MR/TR   Continue entresto/pending BNP   Daily weights/strict I&Os/Low sodium diet- 1800 ml fluid restriction     PAF-on chronic AC- Eliquis     CKD. sCr 2.0 and appears at baseline. Monitor lab trends/ renally dose medications      Diet Cardiac low sodium. DVT Prophylaxis [] Lovenox, []  Heparin, [] SCDs, [] Ambulation  [x] Long term AC   GI Prophylaxis [] PPI,  [x] H2 Blocker,  [] Carafate,  [] Diet/Tube Feeds   Code Status Full     Disposition Admit to inpatient. Patient plans to return home upon discharge   MDM [] Low, [] Moderate,[]  High     -Patient assessment and plan discussed with supervising physician-  History of Present Illness:     Chief Complaint: Chest Pain; Shortness of Breath; and Fatigue    Omari Hollis is a 80 y.o. male who presents with complaints of 3 day progressive chest pain. He has associated SOB now with dyspnea at rest. Has significant cardiac history to include systolic HF, PAF, and ischemic cardiomyopathy. He has recent admission approx 1 month ago at this facility for exacerbation of HF.  Ed Provider describes acute episode of dyspnea resolved with increase supplemental oxygen after several minutes. Ten point ROS: reviewed negative, unless as noted in above HPI. Objective:   No intake or output data in the 24 hours ending 10/07/18 1512     Vitals:   Vitals:    10/07/18 1330 10/07/18 1347 10/07/18 1351 10/07/18 1439   BP: (!) 124/100 (!) 124/98 (!) 125/97    Pulse: 70 79 70    Resp: 14 13 23 15   Temp:       TempSrc:       SpO2: 98% 100% 96% 97%       Physical Exam: 10/07/18     GEN -Awake nontoxic  appearing male, sitting upright in bed , NAD. normal body habitus. Appears given age. EYES -Pupils are equally round. No scleral erythema, discharge, or conjunctivitis. HENT -MM are moist. Oral pharynx without exudates, no evidence of thrush. NECK -Supple, no apparent thyromegaly or masses. RESP -CTA, no wheezes, rales or rhonchi. Symmetric chest movement while on supplemental oxygen. C/V -S1/S2 auscultated. RRR without appreciable M/R/G. No JVD or carotid bruits. Peripheral pulses equal bilaterally and palpable. No peripheral edema. GI -Abdomen is soft non distended and without significant tenderness. No masses or guarding. + BS Rectal exam deferred.  -No CVA tenderness. Mo catheter is not present. LYMPH-No palpable cervical lymphadenopathy and no hepatosplenomegaly. No petechiae or ecchymoses. MS -No gross joint deformities. SKIN -Normal coloration, warm, dry. NEURO-Cranial nerves appear grossly intact, normal speech, no lateralizing weakness. PSYC-Awake, alert, oriented x 4. Appropriate affect. Past Medical History:      Past Medical History:   Diagnosis Date    Additional heart attack (anterior wall) 8/98    CAD (coronary artery disease)     CHF (congestive heart failure) (HCC)     Heart imaging 6/26/14    MUGA: Abnormal study, EF 39% global hypokinesis of LV.     History of cardiovascular stress test 6/4/2014 2003 6/14 EF 34% suggest prior ant and apical MI, LV function severely reduced

## 2018-10-07 NOTE — ED NOTES
PT c/o not being able to breath with s/s distress and anxiety. Pt moved to [facility] and placed on Non-rebreather at 15L/Min per Dr. Haresh Yanez. Pt now no s/s of distress. Family at bedside.      Yossi Terrell RN  10/07/18 1707

## 2018-10-08 ENCOUNTER — APPOINTMENT (OUTPATIENT)
Dept: NUCLEAR MEDICINE | Age: 83
DRG: 291 | End: 2018-10-08
Payer: COMMERCIAL

## 2018-10-08 LAB
ALBUMIN SERPL-MCNC: 2.8 GM/DL (ref 3.4–5)
ALP BLD-CCNC: 68 IU/L (ref 40–128)
ALT SERPL-CCNC: 8 U/L (ref 10–40)
ANION GAP SERPL CALCULATED.3IONS-SCNC: 10 MMOL/L (ref 4–16)
AST SERPL-CCNC: 12 IU/L (ref 15–37)
BASOPHILS ABSOLUTE: 0.1 K/CU MM
BASOPHILS RELATIVE PERCENT: 1.5 % (ref 0–1)
BILIRUB SERPL-MCNC: 1.3 MG/DL (ref 0–1)
BUN BLDV-MCNC: 33 MG/DL (ref 6–23)
CALCIUM SERPL-MCNC: 8.5 MG/DL (ref 8.3–10.6)
CHLORIDE BLD-SCNC: 102 MMOL/L (ref 99–110)
CHOLESTEROL: 86 MG/DL
CO2: 24 MMOL/L (ref 21–32)
CREAT SERPL-MCNC: 2.1 MG/DL (ref 0.9–1.3)
DIFFERENTIAL TYPE: ABNORMAL
EKG ATRIAL RATE: 70 BPM
EKG DIAGNOSIS: NORMAL
EKG P AXIS: 60 DEGREES
EKG P-R INTERVAL: 180 MS
EKG Q-T INTERVAL: 504 MS
EKG QRS DURATION: 202 MS
EKG QTC CALCULATION (BAZETT): 544 MS
EKG R AXIS: 236 DEGREES
EKG T AXIS: 35 DEGREES
EKG VENTRICULAR RATE: 70 BPM
EOSINOPHILS ABSOLUTE: 0.2 K/CU MM
EOSINOPHILS RELATIVE PERCENT: 3 % (ref 0–3)
GFR AFRICAN AMERICAN: 37 ML/MIN/1.73M2
GFR NON-AFRICAN AMERICAN: 30 ML/MIN/1.73M2
GLUCOSE BLD-MCNC: 107 MG/DL (ref 70–99)
HCT VFR BLD CALC: 39.8 % (ref 42–52)
HDLC SERPL-MCNC: 34 MG/DL
HEMOGLOBIN: 11.1 GM/DL (ref 13.5–18)
IMMATURE NEUTROPHIL %: 0.5 % (ref 0–0.43)
LDL CHOLESTEROL DIRECT: 46 MG/DL
LYMPHOCYTES ABSOLUTE: 0.7 K/CU MM
LYMPHOCYTES RELATIVE PERCENT: 9.9 % (ref 24–44)
MCH RBC QN AUTO: 26.6 PG (ref 27–31)
MCHC RBC AUTO-ENTMCNC: 27.9 % (ref 32–36)
MCV RBC AUTO: 95.4 FL (ref 78–100)
MONOCYTES ABSOLUTE: 0.6 K/CU MM
MONOCYTES RELATIVE PERCENT: 7.9 % (ref 0–4)
NUCLEATED RBC %: 0 %
PDW BLD-RTO: 17.6 % (ref 11.7–14.9)
PLATELET # BLD: 199 K/CU MM (ref 140–440)
PMV BLD AUTO: 9.7 FL (ref 7.5–11.1)
POTASSIUM SERPL-SCNC: 4.1 MMOL/L (ref 3.5–5.1)
POTASSIUM SERPL-SCNC: 4.2 MMOL/L (ref 3.5–5.1)
RBC # BLD: 4.17 M/CU MM (ref 4.6–6.2)
SEGMENTED NEUTROPHILS ABSOLUTE COUNT: 5.7 K/CU MM
SEGMENTED NEUTROPHILS RELATIVE PERCENT: 77.2 % (ref 36–66)
SODIUM BLD-SCNC: 136 MMOL/L (ref 135–145)
TOTAL IMMATURE NEUTOROPHIL: 0.04 K/CU MM
TOTAL NUCLEATED RBC: 0 K/CU MM
TOTAL PROTEIN: 6.3 GM/DL (ref 6.4–8.2)
TRIGL SERPL-MCNC: 57 MG/DL
TROPONIN T: <0.01 NG/ML
WBC # BLD: 7.3 K/CU MM (ref 4–10.5)

## 2018-10-08 PROCEDURE — 93010 ELECTROCARDIOGRAM REPORT: CPT | Performed by: INTERNAL MEDICINE

## 2018-10-08 PROCEDURE — S0028 INJECTION, FAMOTIDINE, 20 MG: HCPCS | Performed by: NURSE PRACTITIONER

## 2018-10-08 PROCEDURE — 2500000003 HC RX 250 WO HCPCS: Performed by: NURSE PRACTITIONER

## 2018-10-08 PROCEDURE — 93005 ELECTROCARDIOGRAM TRACING: CPT | Performed by: NURSE PRACTITIONER

## 2018-10-08 PROCEDURE — 94761 N-INVAS EAR/PLS OXIMETRY MLT: CPT

## 2018-10-08 PROCEDURE — 6370000000 HC RX 637 (ALT 250 FOR IP): Performed by: NURSE PRACTITIONER

## 2018-10-08 PROCEDURE — 78582 LUNG VENTILAT&PERFUS IMAGING: CPT

## 2018-10-08 PROCEDURE — 6370000000 HC RX 637 (ALT 250 FOR IP): Performed by: HOSPITALIST

## 2018-10-08 PROCEDURE — 36415 COLL VENOUS BLD VENIPUNCTURE: CPT

## 2018-10-08 PROCEDURE — 84132 ASSAY OF SERUM POTASSIUM: CPT

## 2018-10-08 PROCEDURE — 1200000000 HC SEMI PRIVATE

## 2018-10-08 PROCEDURE — 84484 ASSAY OF TROPONIN QUANT: CPT

## 2018-10-08 PROCEDURE — A9540 TC99M MAA: HCPCS | Performed by: NURSE PRACTITIONER

## 2018-10-08 PROCEDURE — 99221 1ST HOSP IP/OBS SF/LOW 40: CPT | Performed by: INTERNAL MEDICINE

## 2018-10-08 PROCEDURE — 80053 COMPREHEN METABOLIC PANEL: CPT

## 2018-10-08 PROCEDURE — 2700000000 HC OXYGEN THERAPY PER DAY

## 2018-10-08 PROCEDURE — A9539 TC99M PENTETATE: HCPCS | Performed by: NURSE PRACTITIONER

## 2018-10-08 PROCEDURE — 83721 ASSAY OF BLOOD LIPOPROTEIN: CPT

## 2018-10-08 PROCEDURE — 85025 COMPLETE CBC W/AUTO DIFF WBC: CPT

## 2018-10-08 PROCEDURE — 3430000000 HC RX DIAGNOSTIC RADIOPHARMACEUTICAL: Performed by: NURSE PRACTITIONER

## 2018-10-08 PROCEDURE — 6360000002 HC RX W HCPCS: Performed by: INTERNAL MEDICINE

## 2018-10-08 PROCEDURE — 80061 LIPID PANEL: CPT

## 2018-10-08 PROCEDURE — 2580000003 HC RX 258: Performed by: NURSE PRACTITIONER

## 2018-10-08 PROCEDURE — 94640 AIRWAY INHALATION TREATMENT: CPT

## 2018-10-08 RX ORDER — IPRATROPIUM BROMIDE AND ALBUTEROL SULFATE 2.5; .5 MG/3ML; MG/3ML
1 SOLUTION RESPIRATORY (INHALATION) EVERY 4 HOURS PRN
Status: DISCONTINUED | OUTPATIENT
Start: 2018-10-08 | End: 2018-10-11 | Stop reason: HOSPADM

## 2018-10-08 RX ORDER — MAGNESIUM SULFATE 1 G/100ML
1 INJECTION INTRAVENOUS ONCE
Status: COMPLETED | OUTPATIENT
Start: 2018-10-08 | End: 2018-10-08

## 2018-10-08 RX ORDER — DOBUTAMINE HYDROCHLORIDE 200 MG/100ML
2.5 INJECTION INTRAVENOUS CONTINUOUS
Status: DISCONTINUED | OUTPATIENT
Start: 2018-10-08 | End: 2018-10-10

## 2018-10-08 RX ORDER — KIT FOR THE PREPARATION OF TECHNETIUM TC 99M PENTETATE 20 MG/1
3 INJECTION, POWDER, LYOPHILIZED, FOR SOLUTION INTRAVENOUS; RESPIRATORY (INHALATION)
Status: COMPLETED | OUTPATIENT
Start: 2018-10-08 | End: 2018-10-08

## 2018-10-08 RX ORDER — FUROSEMIDE 10 MG/ML
40 INJECTION INTRAMUSCULAR; INTRAVENOUS 2 TIMES DAILY
Status: DISCONTINUED | OUTPATIENT
Start: 2018-10-08 | End: 2018-10-11 | Stop reason: HOSPADM

## 2018-10-08 RX ADMIN — SACUBITRIL AND VALSARTAN 1 TABLET: 24; 26 TABLET, FILM COATED ORAL at 09:27

## 2018-10-08 RX ADMIN — Medication 6 MILLICURIE: at 11:54

## 2018-10-08 RX ADMIN — SACUBITRIL AND VALSARTAN 1 TABLET: 24; 26 TABLET, FILM COATED ORAL at 22:04

## 2018-10-08 RX ADMIN — KIT FOR THE PREPARATION OF TECHNETIUM TC 99M PENTETATE 3 MILLICURIE: 20 INJECTION, POWDER, LYOPHILIZED, FOR SOLUTION INTRAVENOUS; RESPIRATORY (INHALATION) at 11:53

## 2018-10-08 RX ADMIN — ATORVASTATIN CALCIUM 80 MG: 20 TABLET, FILM COATED ORAL at 22:05

## 2018-10-08 RX ADMIN — CARVEDILOL 12.5 MG: 12.5 TABLET, FILM COATED ORAL at 09:27

## 2018-10-08 RX ADMIN — FUROSEMIDE 40 MG: 10 INJECTION, SOLUTION INTRAMUSCULAR; INTRAVENOUS at 17:20

## 2018-10-08 RX ADMIN — SODIUM CHLORIDE, PRESERVATIVE FREE 10 ML: 5 INJECTION INTRAVENOUS at 09:27

## 2018-10-08 RX ADMIN — FUROSEMIDE 40 MG: 10 INJECTION, SOLUTION INTRAMUSCULAR; INTRAVENOUS at 09:34

## 2018-10-08 RX ADMIN — MAGNESIUM SULFATE HEPTAHYDRATE 1 G: 1 INJECTION, SOLUTION INTRAVENOUS at 18:59

## 2018-10-08 RX ADMIN — IPRATROPIUM BROMIDE AND ALBUTEROL SULFATE 1 AMPULE: .5; 3 SOLUTION RESPIRATORY (INHALATION) at 00:30

## 2018-10-08 RX ADMIN — DOBUTAMINE IN DEXTROSE 2.5 MCG/KG/MIN: 200 INJECTION, SOLUTION INTRAVENOUS at 12:12

## 2018-10-08 RX ADMIN — CARVEDILOL 12.5 MG: 12.5 TABLET, FILM COATED ORAL at 17:18

## 2018-10-08 RX ADMIN — ASPIRIN 81 MG: 81 TABLET, COATED ORAL at 09:27

## 2018-10-08 RX ADMIN — SODIUM CHLORIDE, PRESERVATIVE FREE 10 ML: 5 INJECTION INTRAVENOUS at 22:05

## 2018-10-08 RX ADMIN — APIXABAN 2.5 MG: 2.5 TABLET, FILM COATED ORAL at 22:05

## 2018-10-08 RX ADMIN — FAMOTIDINE 20 MG: 10 INJECTION, SOLUTION INTRAVENOUS at 09:27

## 2018-10-08 RX ADMIN — APIXABAN 2.5 MG: 2.5 TABLET, FILM COATED ORAL at 09:27

## 2018-10-08 RX ADMIN — FAMOTIDINE 20 MG: 10 INJECTION, SOLUTION INTRAVENOUS at 22:05

## 2018-10-08 ASSESSMENT — PAIN DESCRIPTION - PROGRESSION
CLINICAL_PROGRESSION: NOT CHANGED

## 2018-10-08 ASSESSMENT — PAIN SCALES - GENERAL
PAINLEVEL_OUTOF10: 0
PAINLEVEL_OUTOF10: 0

## 2018-10-08 NOTE — PROGRESS NOTES
[address] [place] [address]  HOSPITALIST PROGRESS NOTE                       Name:  Roberto Tam /Age/Sex: 1933  (80 y.o. male)   MRN & CSN:  [id number] & [id number] Admission Date/Time: 10/7/2018  1:02 PM   Location:  [address] [place], [place] [address] Attending:  Yecenia Gray MD                                                  HPI  Roberto Tam is a 80 y.o. male who presents with shortness of breath. SUBJECTIVE  - less short of breath, no chest pain, on NC oxygen, no fever/chills. 10 point review of systems reviewed and negative unless noted above. ALLERGIES:   Allergies   Allergen Reactions    Pcn [Penicillins] Hives    Plavix [Clopidogrel]      Blood in stool       PCP: Blas Garcia MD    PAST MEDICAL HISTORY, SURGICAL HISTORY, SOCIAL HISTORY and  HOME MEDICATIONS all reviewed. OBJECTIVE  Vitals:    10/07/18 2121 10/08/18 0240 10/08/18 0915 10/08/18 1154   BP: 105/78 114/85 104/70 99/71   Pulse: 71 70 83 74   Resp:    Temp: 97.6 °F (36.4 °C) 98.1 °F (36.7 °C) 98 °F (36.7 °C)    TempSrc: Oral Oral Oral    SpO2: 98% 98% 95% 98%   Weight:  169 lb 5.8 oz (76.8 kg)         PHYSICAL EXAM   GEN Awake male, sitting upright in bed in no apparent distress. EYES Pupils are equally round. No scleral erythema, discharge, or conjunctivitis. HENT Mucous membranes are moist. Oral pharynx without exudates, no evidence of thrush. NECK Supple, no apparent thyromegaly or masses. RESP Unlabored respiration, bilateral coarse breath sounds  CARDIO/VASC S1/S2 auscultated. Regular rate without appreciable murmurs, rubs, or gallops. No JVD or carotid bruits. Peripheral pulses equal bilaterally and palpable. positive peripheral edema. GI Abdomen is soft without significant tenderness, masses, or guarding. Bowel sounds are normoactive. Rectal exam deferred.  No costovertebral angle tenderness. Normal appearing external genitalia. HEME/LYMPH No palpable cervical lymphadenopathy and no hepatosplenomegaly.  No petechiae or ecchymoses. MSK Spontaneous movement of all extremities. No gross joint deformities. SKIN Normal coloration, warm, dry. NEURO Cranial nerves appear grossly intact, normal speech, no lateralizing weakness. PSYCH Awake, alert, oriented x 4. Affect appropriate. INTAKE: In: 110 [P.O.:100; I.V.:10]  Out: 1075   OUTPUT: In: 110   Out: 1075 [Urine:1075]    LABS  Recent Labs      10/07/18   1315  10/08/18   0032   WBC  8.1  7.3   HGB  12.1*  11.1*   HCT  41.8*  39.8*   PLT  209  199      Recent Labs      10/07/18   1315  10/08/18   0032   NA  138  136   K  3.8  4.2   CL  102  102   CO2  22  24   BUN  29*  33*   CREATININE  2.0*  2.1*     Recent Labs      10/07/18   1315  10/08/18   0032   AST  11*  12*   ALT  8*  8*   BILITOT  1.5*  1.3*   ALKPHOS  77  68     No results for input(s): INR in the last 72 hours. Recent Labs      10/07/18   1315  10/07/18   1843  10/08/18   0032   TROPONINT  0.014*  0.013*  <0.010        IMAGING      MEDS  Scheduled Meds:   furosemide  40 mg Intravenous BID    apixaban  2.5 mg Oral BID    aspirin EC  81 mg Oral Daily    atorvastatin  80 mg Oral Nightly    carvedilol  12.5 mg Oral BID WC    sacubitril-valsartan  1 tablet Oral BID    sodium chloride flush  10 mL Intravenous 2 times per day    famotidine (PEPCID) injection  20 mg Intravenous BID     Continuous Infusions:   DOBUTamine 2.5 mcg/kg/min (10/08/18 1212)     PRN Meds:ipratropium-albuterol, nitroGLYCERIN, sodium chloride flush, acetaminophen, magnesium hydroxide, docusate sodium, ondansetron        ASSESSMENT and PLAN  Hospital Day: 2    1-Acute hypoxic respiratory failure likely due to decompensated systolic HF with shortness of breath/chest pain- V/Q scan pending, no evidence of pneumonia- on 5 liters now while not on oxygen at home. Ischemic work up as per cardio- stress planned.   2-Acute on chronic systolic HF with last EF reported less than 25%, s/P ICD- being started on dobutamine drip and lasix IV-

## 2018-10-08 NOTE — PROGRESS NOTES
Patient had 13 beat run of V-tach. He was standing to urinate at the time. Remained asymptomatic. Dr Urmila Thomason here at the time and aware. Orders placed.

## 2018-10-08 NOTE — CONSULTS
with known history of  coronary artery disease, cardiomyopathy with severely reduced LV  function, appears to have been admitted with decompensated congestive  heart failure. His chest pain symptoms are not typical for ischemic  heart disease. We will keep him on IV diuresis and IV dobutamine  therapy and properly go through a Cardiolite perfusion imaging for  further risk stratification to make sure his coronary artery disease  part is not getting worse. Apparently, the patient is getting a V/Q  scan done today hence may have to wait for Cardiolite perfusion  imaging to be done 2 days later.         Sydni Hagan MD    D: 10/08/2018 9:40:13       T: 10/08/2018 14:39:33     [id number]  Job#: [id number]     Doc#: [id number]    CC:

## 2018-10-09 LAB
ALBUMIN SERPL-MCNC: 3.1 GM/DL (ref 3.4–5)
ANION GAP SERPL CALCULATED.3IONS-SCNC: 14 MMOL/L (ref 4–16)
BUN BLDV-MCNC: 32 MG/DL (ref 6–23)
CALCIUM SERPL-MCNC: 8.7 MG/DL (ref 8.3–10.6)
CHLORIDE BLD-SCNC: 101 MMOL/L (ref 99–110)
CO2: 25 MMOL/L (ref 21–32)
CREAT SERPL-MCNC: 2.1 MG/DL (ref 0.9–1.3)
GFR AFRICAN AMERICAN: 37 ML/MIN/1.73M2
GFR NON-AFRICAN AMERICAN: 30 ML/MIN/1.73M2
GLUCOSE BLD-MCNC: 88 MG/DL (ref 70–99)
HCT VFR BLD CALC: 40.6 % (ref 42–52)
HEMOGLOBIN: 11.8 GM/DL (ref 13.5–18)
MCH RBC QN AUTO: 26.9 PG (ref 27–31)
MCHC RBC AUTO-ENTMCNC: 29.1 % (ref 32–36)
MCV RBC AUTO: 92.5 FL (ref 78–100)
PDW BLD-RTO: 17.3 % (ref 11.7–14.9)
PHOSPHORUS: 3.7 MG/DL (ref 2.5–4.9)
PLATELET # BLD: 209 K/CU MM (ref 140–440)
PMV BLD AUTO: 9.3 FL (ref 7.5–11.1)
POTASSIUM SERPL-SCNC: 3.7 MMOL/L (ref 3.5–5.1)
RBC # BLD: 4.39 M/CU MM (ref 4.6–6.2)
SODIUM BLD-SCNC: 140 MMOL/L (ref 135–145)
WBC # BLD: 7.7 K/CU MM (ref 4–10.5)

## 2018-10-09 PROCEDURE — 99232 SBSQ HOSP IP/OBS MODERATE 35: CPT | Performed by: INTERNAL MEDICINE

## 2018-10-09 PROCEDURE — 2500000003 HC RX 250 WO HCPCS: Performed by: NURSE PRACTITIONER

## 2018-10-09 PROCEDURE — 94761 N-INVAS EAR/PLS OXIMETRY MLT: CPT

## 2018-10-09 PROCEDURE — 2700000000 HC OXYGEN THERAPY PER DAY

## 2018-10-09 PROCEDURE — 1200000000 HC SEMI PRIVATE

## 2018-10-09 PROCEDURE — 6360000002 HC RX W HCPCS: Performed by: INTERNAL MEDICINE

## 2018-10-09 PROCEDURE — 2580000003 HC RX 258: Performed by: NURSE PRACTITIONER

## 2018-10-09 PROCEDURE — 80069 RENAL FUNCTION PANEL: CPT

## 2018-10-09 PROCEDURE — 6370000000 HC RX 637 (ALT 250 FOR IP): Performed by: NURSE PRACTITIONER

## 2018-10-09 PROCEDURE — S0028 INJECTION, FAMOTIDINE, 20 MG: HCPCS | Performed by: NURSE PRACTITIONER

## 2018-10-09 PROCEDURE — 36415 COLL VENOUS BLD VENIPUNCTURE: CPT

## 2018-10-09 PROCEDURE — 85027 COMPLETE CBC AUTOMATED: CPT

## 2018-10-09 RX ADMIN — SACUBITRIL AND VALSARTAN 1 TABLET: 24; 26 TABLET, FILM COATED ORAL at 21:33

## 2018-10-09 RX ADMIN — DOBUTAMINE IN DEXTROSE 2.5 MCG/KG/MIN: 200 INJECTION, SOLUTION INTRAVENOUS at 23:56

## 2018-10-09 RX ADMIN — SODIUM CHLORIDE, PRESERVATIVE FREE 10 ML: 5 INJECTION INTRAVENOUS at 09:32

## 2018-10-09 RX ADMIN — FAMOTIDINE 20 MG: 10 INJECTION, SOLUTION INTRAVENOUS at 21:35

## 2018-10-09 RX ADMIN — APIXABAN 2.5 MG: 2.5 TABLET, FILM COATED ORAL at 21:33

## 2018-10-09 RX ADMIN — FAMOTIDINE 20 MG: 10 INJECTION, SOLUTION INTRAVENOUS at 09:32

## 2018-10-09 RX ADMIN — FUROSEMIDE 40 MG: 10 INJECTION, SOLUTION INTRAMUSCULAR; INTRAVENOUS at 17:13

## 2018-10-09 RX ADMIN — SACUBITRIL AND VALSARTAN 1 TABLET: 24; 26 TABLET, FILM COATED ORAL at 09:32

## 2018-10-09 RX ADMIN — APIXABAN 2.5 MG: 2.5 TABLET, FILM COATED ORAL at 09:33

## 2018-10-09 RX ADMIN — ATORVASTATIN CALCIUM 80 MG: 20 TABLET, FILM COATED ORAL at 21:35

## 2018-10-09 RX ADMIN — CARVEDILOL 12.5 MG: 12.5 TABLET, FILM COATED ORAL at 09:32

## 2018-10-09 RX ADMIN — ASPIRIN 81 MG: 81 TABLET, COATED ORAL at 09:33

## 2018-10-09 RX ADMIN — FUROSEMIDE 40 MG: 10 INJECTION, SOLUTION INTRAMUSCULAR; INTRAVENOUS at 09:32

## 2018-10-09 RX ADMIN — SODIUM CHLORIDE, PRESERVATIVE FREE 10 ML: 5 INJECTION INTRAVENOUS at 21:38

## 2018-10-09 ASSESSMENT — PAIN DESCRIPTION - PROGRESSION

## 2018-10-09 ASSESSMENT — PAIN SCALES - GENERAL: PAINLEVEL_OUTOF10: 0

## 2018-10-09 NOTE — PROGRESS NOTES
Affect appropriate. LABS  Recent Labs      10/07/18   1315  10/08/18   0032  10/09/18   0328   WBC  8.1  7.3  7.7   HGB  12.1*  11.1*  11.8*   HCT  41.8*  39.8*  40.6*   PLT  209  199  209      Recent Labs      10/07/18   1315  10/08/18   0032  10/08/18   1823  10/09/18   0328   NA  138  136   --   140   K  3.8  4.2  4.1  3.7   CL  102  102   --   101   CO2  22  24   --   25   PHOS   --    --    --   3.7   BUN  29*  33*   --   32*   CREATININE  2.0*  2.1*   --   2.1*     Recent Labs      10/07/18   1315  10/08/18   0032   AST  11*  12*   ALT  8*  8*   BILITOT  1.5*  1.3*   ALKPHOS  77  68     No results for input(s): INR in the last 72 hours. Recent Labs      10/07/18   1315  10/07/18   1843  10/08/18   0032   TROPONINT  0.014*  0.013*  <0.010        IMAGING - personally reviewed in chart. MEDS  Scheduled Meds:   furosemide  40 mg Intravenous BID    apixaban  2.5 mg Oral BID    aspirin EC  81 mg Oral Daily    atorvastatin  80 mg Oral Nightly    carvedilol  12.5 mg Oral BID WC    sacubitril-valsartan  1 tablet Oral BID    sodium chloride flush  10 mL Intravenous 2 times per day    famotidine (PEPCID) injection  20 mg Intravenous BID     Continuous Infusions:   DOBUTamine 2.5 mcg/kg/min (10/08/18 1212)     ASSESSMENT and [address] [facility] Day: 3    1-Acute hypoxic respiratory failure likely due to decompensated systolic HF with shortness of breath/chest pain- V/Q scan pending, no evidence of pneumonia- on 5 liters now while not on oxygen at home. Ischemic work up as per cardio- stress planned. 2-Acute on chronic systolic HF with last EF reported less than 25%, s/P ICD- being started on dobutamine drip and lasix IV- continue to monitor for now                other chronic conditiojns   PAF-on chronic AC- Eliquis   CKD. sCr 2.0 and appears at baseline.      Continue dobutamine gtt as sbp 60-90s on monitor  He would like to go home but discussed the risks and he will stay  Up with assistance only  Continue

## 2018-10-09 NOTE — PROGRESS NOTES
change    Medications:    furosemide  40 mg Intravenous BID    apixaban  2.5 mg Oral BID    aspirin EC  81 mg Oral Daily    atorvastatin  80 mg Oral Nightly    carvedilol  12.5 mg Oral BID     sacubitril-valsartan  1 tablet Oral BID    sodium chloride flush  10 mL Intravenous 2 times per day    famotidine (PEPCID) injection  20 mg Intravenous BID      DOBUTamine 2.5 mcg/kg/min (10/08/18 1212)     ipratropium-albuterol, nitroGLYCERIN, sodium chloride flush, acetaminophen, magnesium hydroxide, docusate sodium, ondansetron    Lab Data:  CBC: Recent Labs      10/07/18   1315  10/08/18   0032  10/09/18   0328   WBC  8.1  7.3  7.7   HGB  12.1*  11.1*  11.8*   HCT  41.8*  39.8*  40.6*   MCV  92.5  95.4  92.5   PLT  209  199  209     BMP: Recent Labs      10/07/18   1315  10/08/18   0032  10/08/18   1823  10/09/18   0328   NA  138  136   --   140   K  3.8  4.2  4.1  3.7   CL  102  102   --   101   CO2  22  24   --   25   PHOS   --    --    --   3.7   BUN  29*  33*   --   32*   CREATININE  2.0*  2.1*   --   2.1*     PT/INR: No results for input(s): PROTIME, INR in the last 72 hours. BNP:    Recent Labs      10/07/18   1315   PROBNP  21,759*     TROPONIN:   Recent Labs      10/07/18   1315  10/07/18   1843  10/08/18   0032   TROPONINT  0.014*  0.013*  <0.010        ECHO :   echocardiogram    Impression:  Active Problems:    Unstable angina (HCC)    Acute chest pain    Dyspnea    Chronic systolic congestive heart failure (HCC)  Resolved Problems:    * No resolved hospital problems. *       All labs, medications and tests reviewed by myself , continue all other medications of all above medical condition listed as is except for changes mentioned above. Thank you very much for consult , please call with questions. I have seen ,spoken to  and examined this patient personally, independently of the nurse practitioner. I have reviewed the hospital care given to date and reviewed all pertinent labs and imaging. The

## 2018-10-10 ENCOUNTER — APPOINTMENT (OUTPATIENT)
Dept: NUCLEAR MEDICINE | Age: 83
DRG: 291 | End: 2018-10-10
Payer: COMMERCIAL

## 2018-10-10 LAB
LV EF: 12 %
LVEF MODALITY: NORMAL

## 2018-10-10 PROCEDURE — 2500000003 HC RX 250 WO HCPCS: Performed by: NURSE PRACTITIONER

## 2018-10-10 PROCEDURE — 2580000003 HC RX 258: Performed by: NURSE PRACTITIONER

## 2018-10-10 PROCEDURE — 6360000002 HC RX W HCPCS: Performed by: INTERNAL MEDICINE

## 2018-10-10 PROCEDURE — 3430000000 HC RX DIAGNOSTIC RADIOPHARMACEUTICAL: Performed by: INTERNAL MEDICINE

## 2018-10-10 PROCEDURE — 6370000000 HC RX 637 (ALT 250 FOR IP): Performed by: NURSE PRACTITIONER

## 2018-10-10 PROCEDURE — S0028 INJECTION, FAMOTIDINE, 20 MG: HCPCS | Performed by: NURSE PRACTITIONER

## 2018-10-10 PROCEDURE — 99232 SBSQ HOSP IP/OBS MODERATE 35: CPT | Performed by: INTERNAL MEDICINE

## 2018-10-10 PROCEDURE — 94762 N-INVAS EAR/PLS OXIMTRY CONT: CPT

## 2018-10-10 PROCEDURE — 78452 HT MUSCLE IMAGE SPECT MULT: CPT

## 2018-10-10 PROCEDURE — 94761 N-INVAS EAR/PLS OXIMETRY MLT: CPT

## 2018-10-10 PROCEDURE — 1200000000 HC SEMI PRIVATE

## 2018-10-10 PROCEDURE — 94150 VITAL CAPACITY TEST: CPT

## 2018-10-10 PROCEDURE — 94640 AIRWAY INHALATION TREATMENT: CPT

## 2018-10-10 PROCEDURE — 94010 BREATHING CAPACITY TEST: CPT

## 2018-10-10 PROCEDURE — A9500 TC99M SESTAMIBI: HCPCS | Performed by: INTERNAL MEDICINE

## 2018-10-10 PROCEDURE — 93017 CV STRESS TEST TRACING ONLY: CPT

## 2018-10-10 PROCEDURE — 6370000000 HC RX 637 (ALT 250 FOR IP): Performed by: STUDENT IN AN ORGANIZED HEALTH CARE EDUCATION/TRAINING PROGRAM

## 2018-10-10 RX ADMIN — CARVEDILOL 12.5 MG: 12.5 TABLET, FILM COATED ORAL at 18:19

## 2018-10-10 RX ADMIN — Medication 30 MILLICURIE: at 11:40

## 2018-10-10 RX ADMIN — FUROSEMIDE 40 MG: 10 INJECTION, SOLUTION INTRAMUSCULAR; INTRAVENOUS at 18:19

## 2018-10-10 RX ADMIN — FAMOTIDINE 20 MG: 10 INJECTION, SOLUTION INTRAVENOUS at 22:27

## 2018-10-10 RX ADMIN — ATORVASTATIN CALCIUM 80 MG: 20 TABLET, FILM COATED ORAL at 22:27

## 2018-10-10 RX ADMIN — SACUBITRIL AND VALSARTAN 1 TABLET: 49; 51 TABLET, FILM COATED ORAL at 22:27

## 2018-10-10 RX ADMIN — SODIUM CHLORIDE, PRESERVATIVE FREE 10 ML: 5 INJECTION INTRAVENOUS at 22:28

## 2018-10-10 RX ADMIN — APIXABAN 2.5 MG: 2.5 TABLET, FILM COATED ORAL at 22:27

## 2018-10-10 RX ADMIN — Medication 10 MILLICURIE: at 11:40

## 2018-10-10 RX ADMIN — SODIUM CHLORIDE, PRESERVATIVE FREE 10 ML: 5 INJECTION INTRAVENOUS at 10:32

## 2018-10-10 RX ADMIN — REGADENOSON 0.4 MG: 0.08 INJECTION, SOLUTION INTRAVENOUS at 10:31

## 2018-10-10 ASSESSMENT — PAIN SCALES - GENERAL
PAINLEVEL_OUTOF10: 0

## 2018-10-10 ASSESSMENT — PAIN DESCRIPTION - PROGRESSION
CLINICAL_PROGRESSION: NOT CHANGED
CLINICAL_PROGRESSION: NOT CHANGED

## 2018-10-10 NOTE — CARE COORDINATION
Cm attempted to follow up with pt for continued dc planning and pt is out of room at this time for stress test, white board updated with CM contact information. 12:10 PM  CM in to room and spoke with pt, wife, 2 sons and 1 daughter in law. Plan is return home once medically stable with [facility] and family to likely set up non skilled [place] [id number] and beginning looking at [address] [facility] with pt/wife. Sons questioned home O2 and were updated this CM does not assist with home O2 at all. RT in room and updated of their concern. Pt and family declined any further dc needs at this time. PS message to [facility] with [facility] to update about referral.   CM updating phone numbers to 1. [phone], 2. [phone], 3. [phone] for [facility] [place]. calls. CM available if needs arise.

## 2018-10-10 NOTE — PROGRESS NOTES
nitroGLYCERIN, sodium chloride flush, acetaminophen, magnesium hydroxide, docusate sodium, ondansetron    Lab Data:  CBC:   Recent Labs      10/07/18   1315  10/08/18   0032  10/09/18   0328   WBC  8.1  7.3  7.7   HGB  12.1*  11.1*  11.8*   HCT  41.8*  39.8*  40.6*   MCV  92.5  95.4  92.5   PLT  209  199  209     BMP:   Recent Labs      10/07/18   1315  10/08/18   0032  10/08/18   1823  10/09/18   0328   NA  138  136   --   140   K  3.8  4.2  4.1  3.7   CL  102  102   --   101   CO2  22  24   --   25   PHOS   --    --    --   3.7   BUN  29*  33*   --   32*   CREATININE  2.0*  2.1*   --   2.1*     PT/INR: No results for input(s): PROTIME, INR in the last 72 hours. BNP:    Recent Labs      10/07/18   1315   PROBNP  21,759*     TROPONIN:   Recent Labs      10/07/18   1315  10/07/18   1843  10/08/18   0032   TROPONINT  0.014*  0.013*  <0.010        ECHO :   2/27/2018  This is a limited echo. Definity was utilized to rule out LV thrombus. Left ventricular systolic function is severely depressed. Ejection fraction is visually estimated at < 25 %. Mild to moderate mitral regurgitation is present. Mild tricuspid regurgitation. Moderate PHTN with RVSP of 52 mmHg. No evidence of any pericardial effusion. Impression:  Active Problems:    Unstable angina (HCC)    Acute chest pain    Dyspnea    Chronic systolic congestive heart failure (HCC)  Resolved Problems:    * No resolved hospital problems. *       All labs, medications and tests reviewed by myself , continue all other medications of all above medical condition listed as is except for changes mentioned above. Thank you very much for consult , please call with questions. I have seen ,spoken to  and examined this patient personally, independently of the nurse practitioner. I have reviewed the hospital care given to date and reviewed all pertinent labs and imaging. The plan was developed mutually at the time of the visit with the patient, Clinical Nurse

## 2018-10-10 NOTE — PROGRESS NOTES
[facility]  HOSPITALIST PROGRESS NOTE                       Name:  Bruno Gonzalez /Age/Sex: 1933  (80 y.o. male)   MRN & CSN:  [id number] & [id number] Admission Date/Time: 10/7/2018  1:02 PM   Location:  [address] [place] [address] Attending:  Andrews Sandoval, DO                                                  HPI  Bruno Gonzalez is a 80 y.o. male who presents with shortness of breath. SUBJECTIVE  Family at bedside. Appears calmer today. Denies cp sob or ha. Doesn't want to give up driving his car. Discussed at length with him and his family. 10 point review of systems reviewed and negative unless noted above. OBJECTIVE  Vitals:    10/10/18 0840 10/10/18 1223 10/10/18 1439 10/10/18 1507   BP: 118/82  (!) 122/92    Pulse: 70  77    Resp:  19   Temp:   98.2 °F (36.8 °C)    TempSrc:   Oral    SpO2: 95% 96% 95% 95%   Weight:       Height:           PHYSICAL EXAM   GEN Awake male, laying in bed in no apparent distress. Appears given age. EYES Pupils are equally round. No scleral discharge  HENT Atraumatic and symmetric head  NECK No apparent thyromegaly  RESP Symmetric chest movement while on room air. No wheezing  CARDIO/VASC Peripheral pulses equal bilaterally and palpable. No peripheral edema. murmur  GI Abdomen is not distended. Rectal exam deferred.  Mo catheter is not present. HEME/LYMPH No petechiae or ecchymoses. MSK Spontaneous movement of BL upper extremities  SKIN Normal coloration, warm, dry. NEURO Cranial nerves appear grossly intact  PSYCH Awake, alert.  Oriented x3    LABS  Recent Labs      10/08/18   0032  10/09/18   0328   WBC  7.3  7.7   HGB  11.1*  11.8*   HCT  39.8*  40.6*   PLT  199  209      Recent Labs      10/08/18   0032  10/08/18   1823  10/09/18   0328   NA  136   --   140   K  4.2  4.1  3.7   CL  102   --   101   CO2  24   --   25   PHOS   --    --   3.7   BUN  33*   --   32*   CREATININE  2.1*   --   2.1*     Recent Labs      10/08/18   0032   AST  12*   ALT

## 2018-10-11 ENCOUNTER — TELEPHONE (OUTPATIENT)
Dept: FAMILY MEDICINE CLINIC | Age: 83
End: 2018-10-11

## 2018-10-11 VITALS
TEMPERATURE: 97.6 F | BODY MASS INDEX: 25.46 KG/M2 | WEIGHT: 158.4 LBS | HEART RATE: 70 BPM | OXYGEN SATURATION: 90 % | HEIGHT: 66 IN | DIASTOLIC BLOOD PRESSURE: 78 MMHG | RESPIRATION RATE: 24 BRPM | SYSTOLIC BLOOD PRESSURE: 108 MMHG

## 2018-10-11 PROCEDURE — S0028 INJECTION, FAMOTIDINE, 20 MG: HCPCS | Performed by: NURSE PRACTITIONER

## 2018-10-11 PROCEDURE — 94760 N-INVAS EAR/PLS OXIMETRY 1: CPT

## 2018-10-11 PROCEDURE — 2500000003 HC RX 250 WO HCPCS: Performed by: NURSE PRACTITIONER

## 2018-10-11 PROCEDURE — 6370000000 HC RX 637 (ALT 250 FOR IP): Performed by: NURSE PRACTITIONER

## 2018-10-11 PROCEDURE — 2580000003 HC RX 258: Performed by: NURSE PRACTITIONER

## 2018-10-11 PROCEDURE — 99232 SBSQ HOSP IP/OBS MODERATE 35: CPT | Performed by: INTERNAL MEDICINE

## 2018-10-11 PROCEDURE — 6370000000 HC RX 637 (ALT 250 FOR IP): Performed by: INTERNAL MEDICINE

## 2018-10-11 PROCEDURE — 6370000000 HC RX 637 (ALT 250 FOR IP): Performed by: STUDENT IN AN ORGANIZED HEALTH CARE EDUCATION/TRAINING PROGRAM

## 2018-10-11 PROCEDURE — 6360000002 HC RX W HCPCS: Performed by: INTERNAL MEDICINE

## 2018-10-11 RX ORDER — MIDODRINE HYDROCHLORIDE 5 MG/1
10 TABLET ORAL ONCE
Status: COMPLETED | OUTPATIENT
Start: 2018-10-11 | End: 2018-10-11

## 2018-10-11 RX ORDER — MIDODRINE HYDROCHLORIDE 10 MG/1
10 TABLET ORAL
Qty: 90 TABLET | Refills: 3 | Status: ON HOLD | OUTPATIENT
Start: 2018-10-11 | End: 2018-11-19 | Stop reason: HOSPADM

## 2018-10-11 RX ORDER — MIDODRINE HYDROCHLORIDE 5 MG/1
10 TABLET ORAL
Status: DISCONTINUED | OUTPATIENT
Start: 2018-10-11 | End: 2018-10-11 | Stop reason: HOSPADM

## 2018-10-11 RX ADMIN — ASPIRIN 81 MG: 81 TABLET, COATED ORAL at 09:12

## 2018-10-11 RX ADMIN — SODIUM CHLORIDE, PRESERVATIVE FREE 10 ML: 5 INJECTION INTRAVENOUS at 09:13

## 2018-10-11 RX ADMIN — APIXABAN 2.5 MG: 2.5 TABLET, FILM COATED ORAL at 09:12

## 2018-10-11 RX ADMIN — SACUBITRIL AND VALSARTAN 1 TABLET: 49; 51 TABLET, FILM COATED ORAL at 09:14

## 2018-10-11 RX ADMIN — MIDODRINE HYDROCHLORIDE 10 MG: 5 TABLET ORAL at 09:39

## 2018-10-11 RX ADMIN — FAMOTIDINE 20 MG: 10 INJECTION, SOLUTION INTRAVENOUS at 09:12

## 2018-10-11 RX ADMIN — MIDODRINE HYDROCHLORIDE 10 MG: 5 TABLET ORAL at 11:36

## 2018-10-11 RX ADMIN — FUROSEMIDE 40 MG: 10 INJECTION, SOLUTION INTRAMUSCULAR; INTRAVENOUS at 09:12

## 2018-10-11 RX ADMIN — CARVEDILOL 12.5 MG: 12.5 TABLET, FILM COATED ORAL at 09:40

## 2018-10-11 ASSESSMENT — PAIN DESCRIPTION - PROGRESSION
CLINICAL_PROGRESSION: NOT CHANGED

## 2018-10-11 NOTE — TELEPHONE ENCOUNTER
[facility] Initial Follow Up Call    Outreach made within 2 business days of discharge: Yes    Patient: Quique Jorgensen Patient : 1933   MRN: [id number]  Reason for Admission: There are no discharge diagnoses documented for the most recent discharge. Discharge Date: 10/11/18       Spoke with: EDWIN to call the office    Discharge department[facility]facility[facility]    TCM Interactive Patient Contact:  Was patient able to fill all prescriptions: Yes  Was patient instructed to bring all medications to the follow-up visit: Yes  Is patient taking all medications as directed in the discharge summary? Yes  Does patient understand their discharge instructions: Yes  Does patient have questions or concerns that need addressed prior to 7-14 day follow up office visit: yes - office visit was on 10/16/2018.     Scheduled appointment with PCP within 7-14 days    Follow Up  Future Appointments  Date Time Provider My Brewer   10/18/2018 8:45 AM Leonides Velasquez MD [facility]   2018 1:00 PM Kvng Payne [facility]   2018 1:00 PM Monster Chin MD [facility]       Nelly Ma

## 2018-10-11 NOTE — PROGRESS NOTES
had concerns including Chest Pain; Shortness of Breath; and Fatigue. Chief Complaint   Patient presents with    Chest Pain    Shortness of Breath    Fatigue     Interval history:  Patient is relatively stable    Physical Exam:  General:  Awake, alert, NAD  Head:normal  Eye:normal  Neck:  No JVD   Chest:  Clear to auscultation, respiration easy  Cardiovascular:  RRR S1S2  Abdomen:   nontender  Extremities:  1+ edema  Pulses; palpable  Neuro: grossly normal      MEDICAL DECISION MAKING;    I agree with the above plan, which was planned by myself and discussed with NP.       Zoraida Chavez MD [facility]

## 2018-10-11 NOTE — PROGRESS NOTES
Patient was seen in hospital for CAD and CHF. I am prescribing oxygen because the diagnosis and testing requires the patient to have oxygen in the home. Conditions will improve or be benefited by oxygen use. The patient is able to perform good mobility and therefore requires the use of a portable oxygen system for ambulation. Dr. Mili Kang.  Marcia Hernandez, [place]  Internal Medicine Hospitalist  [facility]  10/11/2018 12:05 PM

## 2018-10-11 NOTE — PROGRESS NOTES
10/11/2018 7:23 AM  Patient Room #: 8392/4283-C  Patient Name: Roxana Hubbard    (Step 1 Done by RN if possible otherwise call Pulmonary Diagnostics)  1. Place patient on room air at rest for at least 30 minutes. If patient falls below 88% before 30 minutes then you can record the level and stop. Record room air saturation level _95_ %. If patient is at 88% or below, they will qualify for home oxygen and you can stop. If level does not fall below 88%, fill in level above. If indicated continue to Step 2. Signature:Emelina Jama RCP__ Date: _10/10/2018_  (Step 2&3 Done by KEILY)  2. Ambulate patient on room air until saturation falls below 89%. Record level of room air saturation with ambulation_90_ %. Next, place patient back on ______lpm oxygen and ambulate, record level _____%. (Note:  this level must show improvement from room air level done with ambulation.)  If patients saturation on room air with ambulation is 88% or below AND patient shows improvement with oxygen during ambulation, they will qualify for home oxygen and you can stop. If patient does not drop below 89%, then patient should have an overnight oximetry trending on room air to see if level falls below 88%. Complete level in Step 3 below. 3. Room air overnight oximetry level 88 % for__44_  cumulative minutes. If patients room air oxygen level is < 89% for at least 5 cumulative minutes, patient will qualify for home oxygen and you can stop. (Attach Night Trending Report)    Complete order below: Diagnosis:__CAD, CHF____  Home oxygen at:  Length of Need: ?X Lifetime ?  3 Months     _____lpm or _____%   via  [x] nasal cannula  []mask  [] other:________________         []continuous []  with activity  [x]  Nocturnal   [] Portable Tanks [x]  Concentrator        Therapist Signature:  Rula Alfaro RRT___     Date:  _10/10/2018__  Physician Signature:  ___Electronically Signed in EMR_    Date:

## 2018-10-11 NOTE — PROGRESS NOTES
Pt  Qualified for home oxygen. Needs a progress note form  stating that he needs oxygen at home and that he will benefit from it.  can copy and paste the note that I put in.

## 2018-10-16 ENCOUNTER — OFFICE VISIT (OUTPATIENT)
Dept: FAMILY MEDICINE CLINIC | Age: 83
End: 2018-10-16
Payer: COMMERCIAL

## 2018-10-16 VITALS
SYSTOLIC BLOOD PRESSURE: 122 MMHG | DIASTOLIC BLOOD PRESSURE: 74 MMHG | TEMPERATURE: 96.4 F | OXYGEN SATURATION: 97 % | WEIGHT: 154.9 LBS | HEART RATE: 78 BPM | BODY MASS INDEX: 25 KG/M2

## 2018-10-16 DIAGNOSIS — I48.0 PAF (PAROXYSMAL ATRIAL FIBRILLATION) (HCC): ICD-10-CM

## 2018-10-16 DIAGNOSIS — Z09 HOSPITAL DISCHARGE FOLLOW-UP: ICD-10-CM

## 2018-10-16 DIAGNOSIS — N18.30 CKD (CHRONIC KIDNEY DISEASE) STAGE 3, GFR 30-59 ML/MIN (HCC): ICD-10-CM

## 2018-10-16 DIAGNOSIS — I50.23 ACUTE ON CHRONIC SYSTOLIC CHF (CONGESTIVE HEART FAILURE) (HCC): ICD-10-CM

## 2018-10-16 DIAGNOSIS — I10 ESSENTIAL HYPERTENSION: ICD-10-CM

## 2018-10-16 DIAGNOSIS — J96.21 ACUTE ON CHRONIC RESPIRATORY FAILURE WITH HYPOXIA (HCC): Primary | ICD-10-CM

## 2018-10-16 PROCEDURE — 90662 IIV NO PRSV INCREASED AG IM: CPT | Performed by: FAMILY MEDICINE

## 2018-10-16 PROCEDURE — G0008 ADMIN INFLUENZA VIRUS VAC: HCPCS | Performed by: FAMILY MEDICINE

## 2018-10-16 PROCEDURE — 1111F DSCHRG MED/CURRENT MED MERGE: CPT | Performed by: FAMILY MEDICINE

## 2018-10-16 PROCEDURE — 99495 TRANSJ CARE MGMT MOD F2F 14D: CPT | Performed by: FAMILY MEDICINE

## 2018-10-16 ASSESSMENT — PATIENT HEALTH QUESTIONNAIRE - PHQ9
2. FEELING DOWN, DEPRESSED OR HOPELESS: 0
1. LITTLE INTEREST OR PLEASURE IN DOING THINGS: 0
SUM OF ALL RESPONSES TO PHQ QUESTIONS 1-9: 0
SUM OF ALL RESPONSES TO PHQ QUESTIONS 1-9: 0
SUM OF ALL RESPONSES TO PHQ9 QUESTIONS 1 & 2: 0

## 2018-10-16 NOTE — PATIENT INSTRUCTIONS
fries, pizza, tacos, and other fast foods. · Pickles, olives, ketchup, and other condiments, especially soy sauce, unless labeled sodium-free or low-sodium. If you cannot cook for yourself  · Have family members or friends help you, or have someone cook low-sodium meals. · Check with your local senior nutrition program to find out where meals are served and whether they offer a low-sodium option. You can often find these programs through your local health department or hospital.  · Have meals delivered to your home. Most [facility] have a Meals on [employer] Stout. These programs provide one hot meal a day for older adults, delivered to their homes. Ask whether these meals are low-sodium. Let them know that you are on a low-sodium diet. Limiting fluid intake  · Find a method that works for you. You might simply write down how much you drink every time you do. Some people keep a container filled with the amount of fluid allowed for that day. If they drink from a source other than the container, then they pour out that amount. · Measure your regular drinking glasses to find out how much fluid each one holds. Once you know this, you will not have to measure every time. · Besides water, milk, juices, and other drinks, some foods have a lot of fluid. Count any foods that will melt (such as ice cream or gelatin dessert) or liquid foods (such as soup) as part of your fluid intake for the day. Where can you learn more? Go to https://[employer]healthOfidium. org and sign in to your [employer] account. Enter A166 in the [facility] box to learn more about \"Limiting Sodium and Fluids With Heart Failure: Care Instructions. \"     If you do not have an account, please click on the \"Sign Up Now\" link. Current as of: December 6, 2017  Content Version: 11.7  © 3763-2334 [employer]. Care instructions adapted under license by [facility] ([facility]).  If you have questions about a medical condition or this

## 2018-10-16 NOTE — PROGRESS NOTES
Vaccine Information Sheet, \"Influenza - Inactivated\"  given to Daisha Mcgarry, or parent/legal guardian of  Daisha Mcgarry and verbalized understanding. Patient responses:    Have you ever had a reaction to a flu vaccine? No  Are you able to eat eggs without adverse effects? Yes  Do you have any current illness? No  Have you ever had Guillian Nicholls Syndrome? No    Flu vaccine given per order. Please see immunization tab.
paroxysmal atrial fibrillation, chronic kidney disease, hypertension, hyperlipidemia. Inpatient course: Discharge summary reviewed- see chart. Interval history/Current status: Much improved since hospitalization. He is only using oxygen when he sleeps at night. He denies any chest pain or shortness of breath with exertion. A comprehensive review of systems was negative except for what was noted in the HPI. Vitals:    10/16/18 1432   BP: 122/74   Site: Left Upper Arm   Position: Sitting   Cuff Size: Medium Adult   Pulse: 78   Temp: 96.4 °F (35.8 °C)   TempSrc: Temporal   SpO2: 97%   Weight: 154 lb 14.4 oz (70.3 kg)     Body mass index is 25 kg/m².    Wt Readings from Last 3 Encounters:   10/16/18 154 lb 14.4 oz (70.3 kg)   10/10/18 158 lb 6.4 oz (71.8 kg)   09/12/18 166 lb 3.2 oz (75.4 kg)     BP Readings from Last 3 Encounters:   10/16/18 122/74   10/11/18 108/78   09/12/18 126/88        Physical Exam:  General Appearance: alert and oriented to person, place and time, well developed and well- nourished, in no acute distress  Skin: warm and dry, no rash or erythema  Head: normocephalic and atraumatic  Eyes: pupils equal, round, and reactive to light, extraocular eye movements intact, conjunctivae normal  ENT: tympanic membrane, external ear and ear canal normal bilaterally, nose without deformity, nasal mucosa and turbinates normal without polyps  Neck: supple and non-tender without mass, no thyromegaly or thyroid nodules, no cervical lymphadenopathy  Pulmonary/Chest: clear to auscultation bilaterally- no wheezes, rales or rhonchi, normal air movement, no respiratory distress  Cardiovascular: normal rate, regular rhythm, normal S1 and S2, no murmurs, rubs, clicks, or gallops, distal pulses intact, no carotid bruits  Abdomen: soft, non-tender, non-distended, normal bowel sounds, no masses or organomegaly  Extremities: no cyanosis, clubbing or edema  Musculoskeletal: normal range of motion, no joint

## 2018-11-09 ENCOUNTER — OFFICE VISIT (OUTPATIENT)
Dept: FAMILY MEDICINE CLINIC | Age: 83
End: 2018-11-09
Payer: COMMERCIAL

## 2018-11-09 ENCOUNTER — OFFICE VISIT (OUTPATIENT)
Dept: CARDIOLOGY CLINIC | Age: 83
End: 2018-11-09
Payer: COMMERCIAL

## 2018-11-09 VITALS
SYSTOLIC BLOOD PRESSURE: 118 MMHG | DIASTOLIC BLOOD PRESSURE: 78 MMHG | WEIGHT: 168.2 LBS | HEART RATE: 80 BPM | BODY MASS INDEX: 27.03 KG/M2 | HEIGHT: 66 IN

## 2018-11-09 VITALS
TEMPERATURE: 96.2 F | BODY MASS INDEX: 27.23 KG/M2 | DIASTOLIC BLOOD PRESSURE: 74 MMHG | WEIGHT: 168.7 LBS | SYSTOLIC BLOOD PRESSURE: 124 MMHG | OXYGEN SATURATION: 91 % | HEART RATE: 74 BPM

## 2018-11-09 DIAGNOSIS — M54.41 ACUTE RIGHT-SIDED LOW BACK PAIN WITH RIGHT-SIDED SCIATICA: Primary | ICD-10-CM

## 2018-11-09 DIAGNOSIS — I48.0 PAF (PAROXYSMAL ATRIAL FIBRILLATION) (HCC): Primary | ICD-10-CM

## 2018-11-09 PROCEDURE — 99213 OFFICE O/P EST LOW 20 MIN: CPT | Performed by: FAMILY MEDICINE

## 2018-11-09 PROCEDURE — 99214 OFFICE O/P EST MOD 30 MIN: CPT | Performed by: INTERNAL MEDICINE

## 2018-11-09 RX ORDER — HYDROCODONE BITARTRATE AND ACETAMINOPHEN 5; 325 MG/1; MG/1
1 TABLET ORAL EVERY 6 HOURS PRN
Qty: 28 TABLET | Refills: 0 | Status: ON HOLD | OUTPATIENT
Start: 2018-11-09 | End: 2018-11-19

## 2018-11-09 RX ORDER — NAPROXEN 500 MG/1
500 TABLET ORAL 2 TIMES DAILY WITH MEALS
Qty: 60 TABLET | Refills: 0 | Status: ON HOLD | OUTPATIENT
Start: 2018-11-09 | End: 2018-11-19 | Stop reason: HOSPADM

## 2018-11-09 RX ORDER — BACLOFEN 10 MG/1
10 TABLET ORAL 3 TIMES DAILY
Qty: 30 TABLET | Refills: 0 | Status: ON HOLD | OUTPATIENT
Start: 2018-11-09 | End: 2018-11-19 | Stop reason: HOSPADM

## 2018-11-09 NOTE — PROGRESS NOTES
extremity noted, muscle strength and tone are normal  Skin: no ulcer,no scar,no stasis dermatitis   Neurologic - alert oriented times 3,Cranial nerves II through XII are grossly intact. There were no gross focal neurologic abnormalities. All sensory and motor nerves examined and were normal  Psychiatric: normal mood and affect    Lab Results   Component Value Date    TROPONINI 0.029 05/17/2014     BNP:  No results found for: BNP  PT/INR:  No results found for: [facility]  Lab Results   Component Value Date    LABA1C 5.5 09/18/2012     Lab Results   Component Value Date    CHOL 86 10/08/2018    TRIG 57 10/08/2018    HDL 34 (L) 10/08/2018    LDLCALC 71 02/13/2014    LDLDIRECT 46 10/08/2018     Lab Results   Component Value Date    ALT 8 (L) 10/08/2018    AST 12 (L) 10/08/2018     TSH:  No results found for: TSH    Impression:  Frankie Mcburney is a 80 y. o.year old who  has a past medical history of Additional heart attack (anterior wall); CAD (coronary artery disease); CHF (congestive heart failure) ([facility] [address] Heart imaging; History of cardiovascular stress test; History of echocardiogram; History of left heart catheterization; Hyperlipidemia; Hypertension; ICD (implantable cardioverter-defibrillator), biventricular, in situ; PAF (paroxysmal atrial fibrillation) ([facility] [address] and Shingles. and presents with     Plan: plan remains unchanged  1. Back pain: recommend to see PMD  2. CHF: evolumic, continue entreso, coreg, ICD check as pre routine, continue lasix  3. CAD:stable, continue statins, coreg, baby aspirin  4. Paroxysmal afib: on eliquis and coreg  5. Dyslipidemia: continue statins  6. HTN: stable, continue coreg and entresto  7. All labs, medications and tests reviewed, continue all other medications of all above medical condition listed as is.

## 2018-11-09 NOTE — PATIENT INSTRUCTIONS
Patient Education        Learning About Low Back Pain  What is low back pain? Low back pain is pain that can occur anywhere below the ribs and above the legs. It is very common. Almost everyone has it at one time or another. Low back pain can be:  Acute. This is new pain that can last a few days to a few weeks--at the most a few months. Chronic. This pain can last for more than a few months. Sometimes it can last for years. What are some myths about low back pain? Here are some common myths about low back pain--and the facts:  Myth: \"I need to rest my back when I have back pain. \"  Fact: Staying active won't hurt you. It may help you get better faster. Myth: \"I need prescription pain medicine. \"  Fact: It's best to try to let time and being active heal your back. Opioid pain medicines--such as hydrocodone or oxycodone--usually don't work any better than over-the-counter medicines like ibuprofen or naproxen. And opioids can cause serious problems like addiction or overdose. Myth: \"I need a test like an X-ray or an MRI to diagnose my low back pain. \"  Fact: Getting a test right away won't help you get better faster. And it could lead you down a treatment path you may not need, since most people get better on their own. What causes low back pain? In most cases, there isn't a clear cause. This can be frustrating, because your back hurts and there's no obvious reason. Your back pain can be caused by:  Overuse or muscle strain. This can happen from playing sports, lifting heavy things, or not being physically fit. A herniated disc. This is a problem with the cushion between the bones in your back. Arthritis. With age, you may have changes in your bones that can narrow the space around your nerves. Other causes. In rare cases, the cause is a serious illness like an infection or cancer. But there are usually other symptoms too. What are the symptoms?   Your symptoms depend on your body and the cause of your back pain. You may feel:  · Pain that's sharp or dull. It may be in one small area or over a broad area. But even bad pain doesn't mean that it's caused by something serious. · Leg pain, numbness, or tingling. When a nerve gets squeezed--such as from a disc problem or arthritis--you may have symptoms in your leg or foot. You can even have leg symptoms from a back problem without having any pain in your back. How is low back pain diagnosed? A physical exam is the main way to diagnose low back pain. Your doctor may examine your back, check your nerves by testing your reflexes, and make sure that your muscles are strong. He or she also will ask questions about your back and overall health. Most people don't need any tests right away. Tests often don't show the reason for your pain. If your pain lasts more than 6 weeks or you have symptoms that your doctor is more concerned about, he or she may order tests. These may include an X-ray, a CT scan, or an MRI. In some cases, tests can help your doctor find a cause for your pain, especially for pain in one or both legs. How is low back pain treated? Most acute low back pain gets better on its own within a few weeks, no matter what the cause. Time and doing usual activities are all that most people need to feel better. Using heat or ice and taking over-the-counter pain medicine also can help while your body heals. If you aren't getting better on your own or your pain is very bad, your doctor may recommend:  · Physical therapy. · Spinal manipulation, such as by a chiropractor. · Acupuncture. · Massage. · Injections of steroid medicine in your back (especially for pain that involves your legs). If you have chronic low back pain, treatment will help you understand and manage your pain. Treatment may include:  · Staying active. This may include walking or doing back exercises. · Physical therapy. · Medicines.  Some of these medicines are also used for other

## 2018-11-09 NOTE — PROGRESS NOTES
SUBJECTIVE:   Fannie Aguilar is a 80 y.o. male who complains of low back pain for 4 day(s), positional with bending or lifting, with radiation down the right leg. Precipitating factors: none recalled by the patient. Prior history of back problems: recurrent self limited episodes of low back pain in the past. There is no numbness in the legs. Improves with recumbancy. Worsenes with standing and sitting    OBJECTIVE:  /74 (Site: Left Upper Arm, Position: Sitting, Cuff Size: Medium Adult)   Pulse 74   Temp 96.2 °F (35.7 °C) (Temporal)   Wt 168 lb 11.2 oz (76.5 kg)   SpO2 91%   BMI 27.23 kg/m²    Patient appears to be in mild to moderate pain, antalgic gait noted. Lumbosacral spine area reveals no local tenderness or mass. Painful and reduced LS ROM noted. Straight leg raise is negative at 90 degrees on bilateral. DTR's, motor strength and sensation normal, including heel and toe gait. Peripheral pulses are palpable. X-Ray: ordered, but results not yet available. ASSESSMENT:   lumbar strain    PLAN:  For acute pain, rest, intermittent application of heat (do not sleep on heating pad), analgesics and muscle relaxants are recommended. Discussed longer term treatment plan of prn NSAID's and discussed a home back care exercise program with flexion exercise routine. Proper lifting with avoidance of heavy lifting discussed. Consider Physical Therapy and XRay studies if not improving. Call or return to clinic prn if these symptoms worsen or fail to improve as anticipated.

## 2018-11-11 ENCOUNTER — HOSPITAL ENCOUNTER (INPATIENT)
Age: 83
LOS: 8 days | Discharge: NURSING, REHAB OR LONG-TERM CARE | DRG: 291 | End: 2018-11-19
Attending: EMERGENCY MEDICINE | Admitting: INTERNAL MEDICINE
Payer: COMMERCIAL

## 2018-11-11 ENCOUNTER — APPOINTMENT (OUTPATIENT)
Dept: GENERAL RADIOLOGY | Age: 83
DRG: 291 | End: 2018-11-11
Payer: COMMERCIAL

## 2018-11-11 DIAGNOSIS — I50.9 ACUTE ON CHRONIC CONGESTIVE HEART FAILURE, UNSPECIFIED HEART FAILURE TYPE (HCC): Primary | ICD-10-CM

## 2018-11-11 DIAGNOSIS — M54.41 ACUTE RIGHT-SIDED LOW BACK PAIN WITH RIGHT-SIDED SCIATICA: ICD-10-CM

## 2018-11-11 LAB
ALBUMIN SERPL-MCNC: 3.1 GM/DL (ref 3.4–5)
ALP BLD-CCNC: 82 IU/L (ref 40–129)
ALT SERPL-CCNC: 14 U/L (ref 10–40)
ANION GAP SERPL CALCULATED.3IONS-SCNC: 12 MMOL/L (ref 4–16)
APTT: 33.5 SECONDS (ref 21.2–33)
AST SERPL-CCNC: 14 IU/L (ref 15–37)
BASE EXCESS: 1 (ref 0–3.3)
BASOPHILS ABSOLUTE: 0.1 K/CU MM
BASOPHILS RELATIVE PERCENT: 1.2 % (ref 0–1)
BILIRUB SERPL-MCNC: 1.7 MG/DL (ref 0–1)
BUN BLDV-MCNC: 36 MG/DL (ref 6–23)
CALCIUM SERPL-MCNC: 9 MG/DL (ref 8.3–10.6)
CARBON MONOXIDE, BLOOD: 2.3 % (ref 0–5)
CHLORIDE BLD-SCNC: 101 MMOL/L (ref 99–110)
CO2 CONTENT: 23 MMOL/L (ref 19–24)
CO2: 22 MMOL/L (ref 21–32)
CREAT SERPL-MCNC: 2.2 MG/DL (ref 0.9–1.3)
DIFFERENTIAL TYPE: ABNORMAL
EOSINOPHILS ABSOLUTE: 0.1 K/CU MM
EOSINOPHILS RELATIVE PERCENT: 1.7 % (ref 0–3)
GFR AFRICAN AMERICAN: 35 ML/MIN/1.73M2
GFR NON-AFRICAN AMERICAN: 29 ML/MIN/1.73M2
GLUCOSE BLD-MCNC: 98 MG/DL (ref 70–99)
HCO3 ARTERIAL: 22 MMOL/L (ref 18–23)
HCT VFR BLD CALC: 43.4 % (ref 42–52)
HEMOGLOBIN: 12.2 GM/DL (ref 13.5–18)
IMMATURE NEUTROPHIL %: 0.3 % (ref 0–0.43)
INR BLD: 1.63 INDEX
LYMPHOCYTES ABSOLUTE: 0.8 K/CU MM
LYMPHOCYTES RELATIVE PERCENT: 10.3 % (ref 24–44)
MCH RBC QN AUTO: 26.2 PG (ref 27–31)
MCHC RBC AUTO-ENTMCNC: 28.1 % (ref 32–36)
MCV RBC AUTO: 93.3 FL (ref 78–100)
METHEMOGLOBIN ARTERIAL: 0.8 %
MONOCYTES ABSOLUTE: 0.5 K/CU MM
MONOCYTES RELATIVE PERCENT: 6.3 % (ref 0–4)
NUCLEATED RBC %: 0 %
O2 SATURATION: 95.7 % (ref 96–97)
PCO2 ARTERIAL: 31 MMHG (ref 32–45)
PDW BLD-RTO: 18.3 % (ref 11.7–14.9)
PH BLOOD: 7.46 (ref 7.34–7.45)
PLATELET # BLD: 151 K/CU MM (ref 140–440)
PMV BLD AUTO: 9.7 FL (ref 7.5–11.1)
PO2 ARTERIAL: 83 MMHG (ref 75–100)
POTASSIUM SERPL-SCNC: 3.9 MMOL/L (ref 3.5–5.1)
PRO-BNP: ABNORMAL PG/ML
PROTHROMBIN TIME: 18.5 SECONDS (ref 9.12–12.5)
RBC # BLD: 4.65 M/CU MM (ref 4.6–6.2)
SEGMENTED NEUTROPHILS ABSOLUTE COUNT: 6.2 K/CU MM
SEGMENTED NEUTROPHILS RELATIVE PERCENT: 80.2 % (ref 36–66)
SODIUM BLD-SCNC: 135 MMOL/L (ref 135–145)
TOTAL IMMATURE NEUTOROPHIL: 0.02 K/CU MM
TOTAL NUCLEATED RBC: 0 K/CU MM
TOTAL PROTEIN: 7.2 GM/DL (ref 6.4–8.2)
TOTAL RETICULOCYTE COUNT: 0.08 K/CU MM
TROPONIN T: 0.01 NG/ML
TROPONIN T: <0.01 NG/ML
WBC # BLD: 7.7 K/CU MM (ref 4–10.5)

## 2018-11-11 PROCEDURE — 93005 ELECTROCARDIOGRAM TRACING: CPT | Performed by: EMERGENCY MEDICINE

## 2018-11-11 PROCEDURE — 2580000003 HC RX 258: Performed by: INTERNAL MEDICINE

## 2018-11-11 PROCEDURE — 99285 EMERGENCY DEPT VISIT HI MDM: CPT

## 2018-11-11 PROCEDURE — 82803 BLOOD GASES ANY COMBINATION: CPT

## 2018-11-11 PROCEDURE — 80053 COMPREHEN METABOLIC PANEL: CPT

## 2018-11-11 PROCEDURE — 96374 THER/PROPH/DIAG INJ IV PUSH: CPT

## 2018-11-11 PROCEDURE — 6360000002 HC RX W HCPCS: Performed by: EMERGENCY MEDICINE

## 2018-11-11 PROCEDURE — 93010 ELECTROCARDIOGRAM REPORT: CPT | Performed by: INTERNAL MEDICINE

## 2018-11-11 PROCEDURE — 83880 ASSAY OF NATRIURETIC PEPTIDE: CPT

## 2018-11-11 PROCEDURE — 6370000000 HC RX 637 (ALT 250 FOR IP): Performed by: PHYSICIAN ASSISTANT

## 2018-11-11 PROCEDURE — 2140000000 HC CCU INTERMEDIATE R&B

## 2018-11-11 PROCEDURE — 93283 PRGRMG EVAL IMPLANTABLE DFB: CPT | Performed by: INTERNAL MEDICINE

## 2018-11-11 PROCEDURE — 36415 COLL VENOUS BLD VENIPUNCTURE: CPT

## 2018-11-11 PROCEDURE — 85025 COMPLETE CBC W/AUTO DIFF WBC: CPT

## 2018-11-11 PROCEDURE — 85610 PROTHROMBIN TIME: CPT

## 2018-11-11 PROCEDURE — 85730 THROMBOPLASTIN TIME PARTIAL: CPT

## 2018-11-11 PROCEDURE — 71046 X-RAY EXAM CHEST 2 VIEWS: CPT

## 2018-11-11 PROCEDURE — 99222 1ST HOSP IP/OBS MODERATE 55: CPT | Performed by: INTERNAL MEDICINE

## 2018-11-11 PROCEDURE — 84484 ASSAY OF TROPONIN QUANT: CPT

## 2018-11-11 RX ORDER — HYDROCODONE BITARTRATE AND ACETAMINOPHEN 5; 325 MG/1; MG/1
1 TABLET ORAL EVERY 6 HOURS PRN
Status: DISCONTINUED | OUTPATIENT
Start: 2018-11-11 | End: 2018-11-19 | Stop reason: HOSPADM

## 2018-11-11 RX ORDER — 0.9 % SODIUM CHLORIDE 0.9 %
250 INTRAVENOUS SOLUTION INTRAVENOUS ONCE
Status: COMPLETED | OUTPATIENT
Start: 2018-11-11 | End: 2018-11-11

## 2018-11-11 RX ORDER — CARVEDILOL 12.5 MG/1
12.5 TABLET ORAL 2 TIMES DAILY WITH MEALS
Status: DISCONTINUED | OUTPATIENT
Start: 2018-11-11 | End: 2018-11-19 | Stop reason: HOSPADM

## 2018-11-11 RX ORDER — FUROSEMIDE 10 MG/ML
80 INJECTION INTRAMUSCULAR; INTRAVENOUS ONCE
Status: COMPLETED | OUTPATIENT
Start: 2018-11-11 | End: 2018-11-11

## 2018-11-11 RX ORDER — ONDANSETRON 2 MG/ML
4 INJECTION INTRAMUSCULAR; INTRAVENOUS EVERY 6 HOURS PRN
Status: DISCONTINUED | OUTPATIENT
Start: 2018-11-11 | End: 2018-11-19 | Stop reason: HOSPADM

## 2018-11-11 RX ORDER — NITROGLYCERIN 0.4 MG/1
0.4 TABLET SUBLINGUAL EVERY 5 MIN PRN
Status: DISCONTINUED | OUTPATIENT
Start: 2018-11-11 | End: 2018-11-19 | Stop reason: HOSPADM

## 2018-11-11 RX ORDER — LANOLIN ALCOHOL/MO/W.PET/CERES
6 CREAM (GRAM) TOPICAL NIGHTLY PRN
Status: DISCONTINUED | OUTPATIENT
Start: 2018-11-11 | End: 2018-11-19 | Stop reason: HOSPADM

## 2018-11-11 RX ORDER — ATORVASTATIN CALCIUM 40 MG/1
80 TABLET, FILM COATED ORAL NIGHTLY
Status: DISCONTINUED | OUTPATIENT
Start: 2018-11-11 | End: 2018-11-19 | Stop reason: HOSPADM

## 2018-11-11 RX ORDER — ZOLPIDEM TARTRATE 5 MG/1
5 TABLET ORAL NIGHTLY PRN
Status: DISCONTINUED | OUTPATIENT
Start: 2018-11-11 | End: 2018-11-15

## 2018-11-11 RX ORDER — ASPIRIN 81 MG/1
81 TABLET ORAL DAILY
Status: DISCONTINUED | OUTPATIENT
Start: 2018-11-12 | End: 2018-11-19 | Stop reason: HOSPADM

## 2018-11-11 RX ORDER — SODIUM CHLORIDE 0.9 % (FLUSH) 0.9 %
10 SYRINGE (ML) INJECTION PRN
Status: DISCONTINUED | OUTPATIENT
Start: 2018-11-11 | End: 2018-11-19 | Stop reason: HOSPADM

## 2018-11-11 RX ORDER — BACLOFEN 10 MG/1
10 TABLET ORAL 3 TIMES DAILY
Status: DISCONTINUED | OUTPATIENT
Start: 2018-11-11 | End: 2018-11-16

## 2018-11-11 RX ORDER — SODIUM CHLORIDE 0.9 % (FLUSH) 0.9 %
10 SYRINGE (ML) INJECTION EVERY 12 HOURS SCHEDULED
Status: DISCONTINUED | OUTPATIENT
Start: 2018-11-11 | End: 2018-11-19 | Stop reason: HOSPADM

## 2018-11-11 RX ORDER — FUROSEMIDE 10 MG/ML
40 INJECTION INTRAMUSCULAR; INTRAVENOUS 2 TIMES DAILY
Status: DISCONTINUED | OUTPATIENT
Start: 2018-11-12 | End: 2018-11-15

## 2018-11-11 RX ADMIN — BACLOFEN 10 MG: 10 TABLET ORAL at 17:00

## 2018-11-11 RX ADMIN — SODIUM CHLORIDE 250 ML: 9 INJECTION, SOLUTION INTRAVENOUS at 19:31

## 2018-11-11 RX ADMIN — ATORVASTATIN CALCIUM 80 MG: 40 TABLET, FILM COATED ORAL at 20:38

## 2018-11-11 RX ADMIN — SACUBITRIL AND VALSARTAN 1 TABLET: 49; 51 TABLET, FILM COATED ORAL at 20:38

## 2018-11-11 RX ADMIN — CARVEDILOL 12.5 MG: 12.5 TABLET, FILM COATED ORAL at 17:01

## 2018-11-11 RX ADMIN — APIXABAN 2.5 MG: 2.5 TABLET, FILM COATED ORAL at 20:38

## 2018-11-11 RX ADMIN — FUROSEMIDE 80 MG: 10 INJECTION, SOLUTION INTRAVENOUS at 11:55

## 2018-11-11 RX ADMIN — BACLOFEN 10 MG: 10 TABLET ORAL at 20:38

## 2018-11-11 ASSESSMENT — PAIN DESCRIPTION - PAIN TYPE: TYPE: ACUTE PAIN

## 2018-11-11 ASSESSMENT — PAIN DESCRIPTION - ORIENTATION: ORIENTATION: LEFT

## 2018-11-11 ASSESSMENT — PAIN SCALES - GENERAL
PAINLEVEL_OUTOF10: 7
PAINLEVEL_OUTOF10: 0
PAINLEVEL_OUTOF10: 0

## 2018-11-11 ASSESSMENT — PAIN DESCRIPTION - LOCATION: LOCATION: CHEST

## 2018-11-11 NOTE — CONSULTS
Name:  Theohor Liner /Age/Sex: 1933  (80 y.o. male)   MRN & CSN:  [id number] & [id number] Admission Date/Time: 2018 10:33 AM   Location:  3330/5640-O PCP: Jose Angel, [address] Melara Day: 1          Referring physician:  Miranda Crystal MD         Reason for consultation:  CHF        Thanks for referral.    Information source: patient    CC;  SOB    Thank you for involving me in taking  care of Theodor Liner who  is a 80 y. o.year  Old male  Presents with  Worsening of moderate recurrent exertional SOB with mild mid sternal recurrent nonradiating CP, also has mild cough with ? Small bloody phlegmn. Optivol on carelink showed fluid overload. In ED given IV lasix , but still with mild SOB was admitted. Had mild lower back pain has been on NSAIDs . Past medical history:    has a past medical history of Additional heart attack (anterior wall); CAD (coronary artery disease); CHF (congestive heart failure) ([address] Heart imaging; History of cardiovascular stress test; History of echocardiogram; History of left heart catheterization; Hyperlipidemia; Hypertension; ICD (implantable cardioverter-defibrillator), biventricular, in situ; PAF (paroxysmal atrial fibrillation) ([address] and Shingles. Past surgical history:   has a past surgical history that includes Total knee arthroplasty ([id number]); Percutaneous Transluminal Coronary Angio (98); and Cardiac defibrillator placement (Left, 2017). Social History:   reports that he has quit smoking. He has never used smokeless tobacco. He reports that he does not drink alcohol or use drugs. Family history:  family history includes Cancer in his brother; Heart Attack in his brother, father, and mother.     Allergies   Allergen Reactions    Pcn [Penicillins] Hives    Plavix [Clopidogrel]      Blood in stool         apixaban (ELIQUIS) tablet 2.5 mg BID   [START ON 2018] aspirin EC tablet 81 mg Daily CHF  HFrEF  Diurse, on BB, on  entresto   - Hyperlipidimea on statin  - HTN on meds stable  - CAD   Had PCIs, last cardiolite last month EF 12%, large infarcts no ischemia  - ICD stable high optivol reviewed  - PAF on natalia Andres MD, 11/11/2018 4:56 PM

## 2018-11-11 NOTE — ED NOTES
Phone report called to floor RN for transport to unit and assumption of care.      Allegra Tran RN  11/11/18 6715

## 2018-11-11 NOTE — H&P
LAD 50% in stent re-stenosis, CX/RCA/LM no significant diaease.  Hyperlipidemia     Hypertension     ICD (implantable cardioverter-defibrillator), biventricular, in situ 03/20/2017    PAF (paroxysmal atrial fibrillation) ([facility])     Shingles        Past Surgical History:   Procedure Laterality Date    CARDIAC DEFIBRILLATOR PLACEMENT Left 03/20/2017    BiV/ICD     PTCA  8/13/98    stent LAD, Diag. branch    TOTAL KNEE ARTHROPLASTY  [id number]       Family History   Problem Relation Age of Onset    Heart Attack Mother     Heart Attack Father     Heart Attack Brother     Cancer Brother      Family history reviewed as above. Social History     Social History    Marital status:      Spouse name: N/A    Number of children: N/A    Years of education: N/A     Occupational History    retired      Social History Main Topics    Smoking status: Former Smoker    Smokeless tobacco: Never Used      Comment: reviewed 5/9/2016. Quit many years ago    Alcohol use No    Drug use: No    Sexual activity: Not Currently     Partners: Female      Comment:      Other Topics Concern    Not on file     Social History Narrative    No narrative on file       No current facility-administered medications for this encounter. Current Outpatient Prescriptions   Medication Sig Dispense Refill    baclofen (LIORESAL) 10 MG tablet Take 1 tablet by mouth 3 times daily 30 tablet 0    naproxen (NAPROSYN) 500 MG tablet Take 1 tablet by mouth 2 times daily (with meals) 60 tablet 0    HYDROcodone-acetaminophen (NORCO) 5-325 MG per tablet Take 1 tablet by mouth every 6 hours as needed for Pain for up to 7 days. . 28 tablet 0    sacubitril-valsartan (ENTRESTO) 49-51 MG per tablet Take 1 tablet by mouth 2 times daily 60 tablet 0    midodrine (PROAMATINE) 10 MG tablet Take 1 tablet by mouth 3 times daily (with meals) 90 tablet 3    furosemide (LASIX) 40 MG tablet Take 1 tablet by mouth 2 times daily 60

## 2018-11-11 NOTE — ED PROVIDER NOTES
ventricular systolic pressure is elevated at 30-40mm Hg.  History of left heart catheterization 4/29/2016    LAD 50% in stent re-stenosis, CX/RCA/LM no significant diaease.  Hyperlipidemia     Hypertension     ICD (implantable cardioverter-defibrillator), biventricular, in situ 03/20/2017    [facility]     Shingles      Past Surgical History:   Procedure Laterality Date    CARDIAC DEFIBRILLATOR PLACEMENT Left 03/20/2017    BiV/ICD     PTCA  8/13/98    stent LAD, Diag. branch    TOTAL KNEE ARTHROPLASTY  [id number]     Family History   Problem Relation Age of Onset    Heart Attack Mother     Heart Attack Father     Heart Attack Brother     Cancer Brother      Social History     Social History    Marital status:      Spouse name: N/A    Number of children: N/A    Years of education: N/A     Occupational History    retired      Social History Main Topics    Smoking status: Former Smoker    Smokeless tobacco: Never Used      Comment: reviewed 5/9/2016.   Quit many years ago    Alcohol use No    Drug use: No    Sexual activity: Not Currently     Partners: Female      Comment:      Other Topics Concern    Not on file     Social History Narrative    No narrative on file     Current Facility-Administered Medications   Medication Dose Route Frequency Provider Last Rate Last Dose    apixaban (ELIQUIS) tablet 2.5 mg  2.5 mg Oral BID Ashanti L Pacer, PA-C   2.5 mg at 11/11/18 2038    [START ON 11/12/2018] aspirin EC tablet 81 mg  81 mg Oral Daily Ashanti L Pacer, PA-C        atorvastatin (LIPITOR) tablet 80 mg  80 mg Oral Nightly Ashanti L Pacer, PA-C   80 mg at 11/11/18 2038    baclofen (LIORESAL) tablet 10 mg  10 mg Oral TID Ashanti L Pacer, PA-C   10 mg at 11/11/18 2038    carvedilol (COREG) tablet 12.5 mg  12.5 mg Oral BID WC Ashanti L Pacer, PA-C   12.5 mg at 11/11/18 1701    melatonin tablet 6 mg  6 mg Oral Nightly PRN Ashanti L Pacer, PA-C       

## 2018-11-12 LAB
ANION GAP SERPL CALCULATED.3IONS-SCNC: 10 MMOL/L (ref 4–16)
BUN BLDV-MCNC: 37 MG/DL (ref 6–23)
CALCIUM SERPL-MCNC: 8.6 MG/DL (ref 8.3–10.6)
CHLORIDE BLD-SCNC: 107 MMOL/L (ref 99–110)
CO2: 27 MMOL/L (ref 21–32)
CREAT SERPL-MCNC: 2.1 MG/DL (ref 0.9–1.3)
GFR AFRICAN AMERICAN: 37 ML/MIN/1.73M2
GFR NON-AFRICAN AMERICAN: 30 ML/MIN/1.73M2
GLUCOSE BLD-MCNC: 102 MG/DL (ref 70–99)
HCT VFR BLD CALC: 37.4 % (ref 42–52)
HEMOGLOBIN: 10.5 GM/DL (ref 13.5–18)
LV EF: 20 %
LVEF MODALITY: NORMAL
MAGNESIUM: 2.3 MG/DL (ref 1.8–2.4)
MCH RBC QN AUTO: 26.3 PG (ref 27–31)
MCHC RBC AUTO-ENTMCNC: 28.1 % (ref 32–36)
MCV RBC AUTO: 93.5 FL (ref 78–100)
PDW BLD-RTO: 18.2 % (ref 11.7–14.9)
PLATELET # BLD: 147 K/CU MM (ref 140–440)
PMV BLD AUTO: 10 FL (ref 7.5–11.1)
POTASSIUM SERPL-SCNC: 3.9 MMOL/L (ref 3.5–5.1)
RBC # BLD: 4 M/CU MM (ref 4.6–6.2)
SODIUM BLD-SCNC: 144 MMOL/L (ref 135–145)
TROPONIN T: <0.01 NG/ML
WBC # BLD: 6.6 K/CU MM (ref 4–10.5)

## 2018-11-12 PROCEDURE — 6370000000 HC RX 637 (ALT 250 FOR IP): Performed by: INTERNAL MEDICINE

## 2018-11-12 PROCEDURE — 6370000000 HC RX 637 (ALT 250 FOR IP): Performed by: PHYSICIAN ASSISTANT

## 2018-11-12 PROCEDURE — 6360000002 HC RX W HCPCS: Performed by: PHYSICIAN ASSISTANT

## 2018-11-12 PROCEDURE — 99232 SBSQ HOSP IP/OBS MODERATE 35: CPT | Performed by: INTERNAL MEDICINE

## 2018-11-12 PROCEDURE — 2580000003 HC RX 258: Performed by: PHYSICIAN ASSISTANT

## 2018-11-12 PROCEDURE — 2140000000 HC CCU INTERMEDIATE R&B

## 2018-11-12 PROCEDURE — 93306 TTE W/DOPPLER COMPLETE: CPT

## 2018-11-12 PROCEDURE — 36415 COLL VENOUS BLD VENIPUNCTURE: CPT

## 2018-11-12 PROCEDURE — 80048 BASIC METABOLIC PNL TOTAL CA: CPT

## 2018-11-12 PROCEDURE — 85027 COMPLETE CBC AUTOMATED: CPT

## 2018-11-12 PROCEDURE — 83735 ASSAY OF MAGNESIUM: CPT

## 2018-11-12 PROCEDURE — 84484 ASSAY OF TROPONIN QUANT: CPT

## 2018-11-12 RX ORDER — MIDODRINE HYDROCHLORIDE 5 MG/1
5 TABLET ORAL
Status: DISCONTINUED | OUTPATIENT
Start: 2018-11-12 | End: 2018-11-19 | Stop reason: HOSPADM

## 2018-11-12 RX ADMIN — SACUBITRIL AND VALSARTAN 1 TABLET: 49; 51 TABLET, FILM COATED ORAL at 21:07

## 2018-11-12 RX ADMIN — FUROSEMIDE 40 MG: 10 INJECTION, SOLUTION INTRAMUSCULAR; INTRAVENOUS at 09:37

## 2018-11-12 RX ADMIN — ATORVASTATIN CALCIUM 80 MG: 40 TABLET, FILM COATED ORAL at 21:06

## 2018-11-12 RX ADMIN — ASPIRIN 81 MG: 81 TABLET, COATED ORAL at 09:35

## 2018-11-12 RX ADMIN — BACLOFEN 10 MG: 10 TABLET ORAL at 21:06

## 2018-11-12 RX ADMIN — SODIUM CHLORIDE, PRESERVATIVE FREE 10 ML: 5 INJECTION INTRAVENOUS at 09:40

## 2018-11-12 RX ADMIN — CARVEDILOL 12.5 MG: 12.5 TABLET, FILM COATED ORAL at 08:44

## 2018-11-12 RX ADMIN — SODIUM CHLORIDE, PRESERVATIVE FREE 10 ML: 5 INJECTION INTRAVENOUS at 21:07

## 2018-11-12 RX ADMIN — APIXABAN 2.5 MG: 2.5 TABLET, FILM COATED ORAL at 21:09

## 2018-11-12 RX ADMIN — SACUBITRIL AND VALSARTAN 1 TABLET: 49; 51 TABLET, FILM COATED ORAL at 09:45

## 2018-11-12 RX ADMIN — BACLOFEN 10 MG: 10 TABLET ORAL at 14:49

## 2018-11-12 RX ADMIN — MIDODRINE HYDROCHLORIDE 5 MG: 5 TABLET ORAL at 17:52

## 2018-11-12 RX ADMIN — MIDODRINE HYDROCHLORIDE 5 MG: 5 TABLET ORAL at 08:44

## 2018-11-12 RX ADMIN — BACLOFEN 10 MG: 10 TABLET ORAL at 09:34

## 2018-11-12 RX ADMIN — MIDODRINE HYDROCHLORIDE 5 MG: 5 TABLET ORAL at 12:16

## 2018-11-12 RX ADMIN — FUROSEMIDE 40 MG: 10 INJECTION, SOLUTION INTRAMUSCULAR; INTRAVENOUS at 21:06

## 2018-11-12 RX ADMIN — ZOLPIDEM TARTRATE 5 MG: 5 TABLET ORAL at 23:52

## 2018-11-12 RX ADMIN — APIXABAN 2.5 MG: 2.5 TABLET, FILM COATED ORAL at 09:35

## 2018-11-12 RX ADMIN — CARVEDILOL 12.5 MG: 12.5 TABLET, FILM COATED ORAL at 17:52

## 2018-11-12 ASSESSMENT — PAIN SCALES - GENERAL
PAINLEVEL_OUTOF10: 0

## 2018-11-12 NOTE — PROGRESS NOTES
reports that he does not drink alcohol or use drugs. Family history:  family history includes Cancer in his brother; Heart Attack in his brother, father, and mother. Allergies   Allergen Reactions    Pcn [Penicillins] Hives    Plavix [Clopidogrel]      Blood in stool       Review of Systems:   All 14 systems were reviewed and are negative  Except for the positive findings  which as documented     /79   Pulse 73   Temp 97.6 °F (36.4 °C) (Oral)   Resp 15   Ht 5' 6\" (1.676 m)   Wt 168 lb 6.4 oz (76.4 kg)   SpO2 98%   BMI 27.18 kg/m²     Intake/Output Summary (Last 24 hours) at 11/12/18 1247  Last data filed at 11/12/18 0900   Gross per 24 hour   Intake              240 ml   Output             1350 ml   Net            -1110 ml       Physical Exam:  Constitutional:  Well developed, Well nourished, No acute distress  HENT:  Normocephalic, Atraumatic, Bilateral external ears normal, Oropharynx moist, No oral exudates, Nose normal.   Neck- No tenderness, Supple, No stridor. Eyes:  PERRL, EOMI, Conjunctiva normal, No discharge. Respiratory:  Normal breath sounds, No respiratory distress, No wheezing, mild SOB   Cardiovascular:  Normal heart rate, Normal rhythm, no murmurs appreciated, No rubs appreciated, No gallops appreciated, JVP not elevated  Abdomen/GI:  Bowel sounds normal, Soft,  Musculoskeletal:  Intact distal pulses, trace edema lower legs, No tenderness, No cyanosis, No clubbing. Integument:  Warm, Dry, No erythema, No rash. Lymphatic:  No lymphadenopathy noted. Neurologic:  Alert & oriented x 3, Normal motor function, Normal sensory function, No focal deficits noted.    Psychiatric:  Affect and Mood :pleasant     Medications:    midodrine  5 mg Oral TID WC    apixaban  2.5 mg Oral BID    aspirin EC  81 mg Oral Daily    atorvastatin  80 mg Oral Nightly    baclofen  10 mg Oral TID    carvedilol  12.5 mg Oral BID WC    sacubitril-valsartan  1 tablet Oral BID    sodium chloride had concerns including Chest Pain (left sided CP since last night with increasing SOB ). Chief Complaint   Patient presents with    Chest Pain     left sided CP since last night with increasing SOB      Interval history:  HAD LOW BP    Physical Exam:  General:  Awake, alert, NAD  Head:normal  Eye:normal  Neck:  No JVD   Chest:  Clear to auscultation, respiration easy  Cardiovascular:  RRR S1S2  Abdomen:   nontender  Extremities:   +1 edema  Pulses; palpable  Neuro: grossly normal      MEDICAL DECISION MAKING;    I agree with the above plan, which was planned by myself and discussed with NP.   Add proamtine for low BP  CPM  ambulate        Maryjane Kumar MD [facility] [place]

## 2018-11-12 NOTE — CARE COORDINATION
Simulation Cart Utilized:  [] Video: (I- Pad/Smart phone)  [] Small steps to self care  [] Self Care  [] Limiting sodium  [] Practice tracking sodium  [] Support after hospitalization  [] Taking an ACE/ARB  [] Recognizing symptoms  [] Daily symptom checks  [] Checking your weight daily  [] Being active  [] When to call for help  [] Your reason to be active  [] taking OTC medications safely  [] Track your symptoms  [] Limiting fluids  [] Medications to avoid   [] Video: ([employer])  []   []   []   []    CHF Book provided (Learning to Live with Heart failure):  [] Yes  [] No     CHF zones/weighing daily:  [] Yes  [] No     Type of Heart Failure/EF review:  [] Yes  [] No     Contributing factor review:  [] Yes  [] No     Medication review:  [x] ACE/ARB/ARNI (Entresto)  [x] BB (Coreg)  [x] Diuretic (Lasix)  [] Vasodilators  [] Aldosterone blockers  [] Hydralazine  [x] Anticougulant (Eliquis)  [x] Other: (ProAmitine)    Low sodium diet:  [] Why should I limit sodium  [] Low sodium food list:  [] Foods to avoid  [] Hidden sources of sodium  [] Reading a food label   [] Low sodium cookbook provided    Proper use of digital scale:  [] Yes  [] No  I provided the patient with a scale:  [] Yes  [] No (Patient has a functioning scale at home)     Reading a medication bottle:  [] Yes  [] No (Why?):    Exercise/Cardiac Rehab review:  [] Yes  [] No     Interactive Notebook review: (www. Rise[facility].org)  [] Yes  [] No     Pacemaker/ Lifevest/ ICD review:  [] Yes  [] No     11/12/18 3:15   15 minutes of education provided  CN visit done. Initial assessment complete. Chart review done and patients son is at the bedside. Patient states he weighs daily but does not limit sodium or fluids at home. Educational materials provided and stoplight reviewed. Patient and son aware of my role as CN and my contact information provided. Will follow. 11/13/18  Patient resting with family at bedside. Patient seems weak and sleepy today. Apparently he fell last night. I introduced myself to the family. CN will continue to follow. 11/14/18 3:00  CN visit done. Son is at bedside. States he is concerned as patient is not bouncing back as quick this admission. Talked about the fall and weakness which is worse. Patient is sleeping with O2 on and BIPAP at the bedside. Son states the patient cannot tolerate wearing the BIPAP and that they have tried. Explained to him that the patient could benefit from using the BIPAP. States he is going to have his brother try to get him to wear it tonight. Patients O2 sat is 100% and he is not in distress but does not wake up during my visit. I will follow up tomorrow. 11/15/18 3:30  Family is currently meeting with Dr Wood Aguilar. Patients condition is continuing to decline. CN will follow with care but will not provide education at this time. 11/16/18 8:50  CN reviewed chart. Dr Wood Aguilar saw the patient 11/15 and Hospice consulted. Family hopes to take patient home with regained strength/rehab. Patient is now a DNR-CCA. I will continue to follow the patient. No further education will be done till patient more alert and stable. OT recommends SNF at discharge.     11/20/18  Patient to [employer]

## 2018-11-12 NOTE — PLAN OF CARE
Problem: Falls - Risk of:  Goal: Will remain free from falls  Will remain free from falls   Outcome: Ongoing    Goal: Absence of physical injury  Absence of physical injury   Outcome: Ongoing      Problem: OXYGENATION/RESPIRATORY FUNCTION  Goal: Patient will maintain patent airway  Outcome: Ongoing    Goal: Patient will achieve/maintain normal respiratory rate/effort  Respiratory rate and effort will be within normal limits for the patient   Outcome: Ongoing      Problem: HEMODYNAMIC STATUS  Goal: Patient has stable vital signs and fluid balance  Outcome: Ongoing      Problem: FLUID AND ELECTROLYTE IMBALANCE  Goal: Fluid and electrolyte balance are achieved/maintained  Outcome: Ongoing      Problem: ACTIVITY INTOLERANCE/IMPAIRED MOBILITY  Goal: Mobility/activity is maintained at optimum level for patient  Outcome: Ongoing

## 2018-11-13 ENCOUNTER — APPOINTMENT (OUTPATIENT)
Dept: GENERAL RADIOLOGY | Age: 83
DRG: 291 | End: 2018-11-13
Payer: COMMERCIAL

## 2018-11-13 ENCOUNTER — APPOINTMENT (OUTPATIENT)
Dept: CT IMAGING | Age: 83
DRG: 291 | End: 2018-11-13
Payer: COMMERCIAL

## 2018-11-13 LAB
ANION GAP SERPL CALCULATED.3IONS-SCNC: 13 MMOL/L (ref 4–16)
BASE EXCESS: 1 (ref 0–3.3)
BASE EXCESS: 1 (ref 0–3.3)
BUN BLDV-MCNC: 43 MG/DL (ref 6–23)
CALCIUM SERPL-MCNC: 9.1 MG/DL (ref 8.3–10.6)
CARBON MONOXIDE, BLOOD: 2.1 % (ref 0–5)
CARBON MONOXIDE, BLOOD: 2.1 % (ref 0–5)
CHLORIDE BLD-SCNC: 105 MMOL/L (ref 99–110)
CO2 CONTENT: 27.3 MMOL/L (ref 19–24)
CO2 CONTENT: 27.9 MMOL/L (ref 19–24)
CO2: 23 MMOL/L (ref 21–32)
CREAT SERPL-MCNC: 2.5 MG/DL (ref 0.9–1.3)
GFR AFRICAN AMERICAN: 30 ML/MIN/1.73M2
GFR NON-AFRICAN AMERICAN: 25 ML/MIN/1.73M2
GLUCOSE BLD-MCNC: 102 MG/DL (ref 70–99)
GLUCOSE BLD-MCNC: 126 MG/DL (ref 70–99)
HCO3 ARTERIAL: 25.8 MMOL/L (ref 18–23)
HCO3 ARTERIAL: 26.3 MMOL/L (ref 18–23)
MAGNESIUM: 2.4 MG/DL (ref 1.8–2.4)
METHEMOGLOBIN ARTERIAL: 0.8 %
METHEMOGLOBIN ARTERIAL: 1.1 %
O2 SATURATION: 82.5 % (ref 96–97)
O2 SATURATION: 95 % (ref 96–97)
PCO2 ARTERIAL: 49 MMHG (ref 32–45)
PCO2 ARTERIAL: 51 MMHG (ref 32–45)
PH BLOOD: 7.32 (ref 7.34–7.45)
PH BLOOD: 7.33 (ref 7.34–7.45)
PO2 ARTERIAL: 49 MMHG (ref 75–100)
PO2 ARTERIAL: 90 MMHG (ref 75–100)
POTASSIUM SERPL-SCNC: 4.4 MMOL/L (ref 3.5–5.1)
SODIUM BLD-SCNC: 141 MMOL/L (ref 135–145)

## 2018-11-13 PROCEDURE — 6370000000 HC RX 637 (ALT 250 FOR IP): Performed by: INTERNAL MEDICINE

## 2018-11-13 PROCEDURE — 36415 COLL VENOUS BLD VENIPUNCTURE: CPT

## 2018-11-13 PROCEDURE — 6360000002 HC RX W HCPCS: Performed by: HOSPITALIST

## 2018-11-13 PROCEDURE — 6370000000 HC RX 637 (ALT 250 FOR IP): Performed by: PHYSICIAN ASSISTANT

## 2018-11-13 PROCEDURE — 82962 GLUCOSE BLOOD TEST: CPT

## 2018-11-13 PROCEDURE — 80048 BASIC METABOLIC PNL TOTAL CA: CPT

## 2018-11-13 PROCEDURE — 99232 SBSQ HOSP IP/OBS MODERATE 35: CPT | Performed by: INTERNAL MEDICINE

## 2018-11-13 PROCEDURE — 82803 BLOOD GASES ANY COMBINATION: CPT

## 2018-11-13 PROCEDURE — 2580000003 HC RX 258: Performed by: PHYSICIAN ASSISTANT

## 2018-11-13 PROCEDURE — 2140000000 HC CCU INTERMEDIATE R&B

## 2018-11-13 PROCEDURE — 83735 ASSAY OF MAGNESIUM: CPT

## 2018-11-13 PROCEDURE — 70450 CT HEAD/BRAIN W/O DYE: CPT

## 2018-11-13 PROCEDURE — 6360000002 HC RX W HCPCS: Performed by: PHYSICIAN ASSISTANT

## 2018-11-13 PROCEDURE — 71045 X-RAY EXAM CHEST 1 VIEW: CPT

## 2018-11-13 RX ORDER — FUROSEMIDE 10 MG/ML
60 INJECTION INTRAMUSCULAR; INTRAVENOUS ONCE
Status: COMPLETED | OUTPATIENT
Start: 2018-11-13 | End: 2018-11-13

## 2018-11-13 RX ADMIN — APIXABAN 2.5 MG: 2.5 TABLET, FILM COATED ORAL at 10:04

## 2018-11-13 RX ADMIN — BACLOFEN 10 MG: 10 TABLET ORAL at 10:06

## 2018-11-13 RX ADMIN — ATORVASTATIN CALCIUM 80 MG: 40 TABLET, FILM COATED ORAL at 22:41

## 2018-11-13 RX ADMIN — CARVEDILOL 12.5 MG: 12.5 TABLET, FILM COATED ORAL at 10:05

## 2018-11-13 RX ADMIN — BACLOFEN 10 MG: 10 TABLET ORAL at 22:40

## 2018-11-13 RX ADMIN — CARVEDILOL 12.5 MG: 12.5 TABLET, FILM COATED ORAL at 22:39

## 2018-11-13 RX ADMIN — MIDODRINE HYDROCHLORIDE 5 MG: 5 TABLET ORAL at 10:03

## 2018-11-13 RX ADMIN — MIDODRINE HYDROCHLORIDE 5 MG: 5 TABLET ORAL at 22:40

## 2018-11-13 RX ADMIN — FUROSEMIDE 40 MG: 10 INJECTION, SOLUTION INTRAMUSCULAR; INTRAVENOUS at 12:42

## 2018-11-13 RX ADMIN — SODIUM CHLORIDE, PRESERVATIVE FREE 10 ML: 5 INJECTION INTRAVENOUS at 12:42

## 2018-11-13 RX ADMIN — MELATONIN TAB 3 MG 6 MG: 3 TAB at 23:28

## 2018-11-13 RX ADMIN — ASPIRIN 81 MG: 81 TABLET, COATED ORAL at 10:04

## 2018-11-13 RX ADMIN — MIDODRINE HYDROCHLORIDE 5 MG: 5 TABLET ORAL at 12:49

## 2018-11-13 RX ADMIN — SACUBITRIL AND VALSARTAN 1 TABLET: 49; 51 TABLET, FILM COATED ORAL at 22:39

## 2018-11-13 RX ADMIN — BACLOFEN 10 MG: 10 TABLET ORAL at 14:49

## 2018-11-13 RX ADMIN — APIXABAN 2.5 MG: 2.5 TABLET, FILM COATED ORAL at 22:40

## 2018-11-13 RX ADMIN — FUROSEMIDE 40 MG: 10 INJECTION, SOLUTION INTRAMUSCULAR; INTRAVENOUS at 22:38

## 2018-11-13 RX ADMIN — SODIUM CHLORIDE, PRESERVATIVE FREE 10 ML: 5 INJECTION INTRAVENOUS at 22:41

## 2018-11-13 RX ADMIN — FUROSEMIDE 60 MG: 10 INJECTION, SOLUTION INTRAVENOUS at 05:54

## 2018-11-13 RX ADMIN — SACUBITRIL AND VALSARTAN 1 TABLET: 49; 51 TABLET, FILM COATED ORAL at 10:04

## 2018-11-13 ASSESSMENT — PAIN SCALES - GENERAL
PAINLEVEL_OUTOF10: 0
PAINLEVEL_OUTOF10: 0

## 2018-11-13 NOTE — PROGRESS NOTES
Patient has been a little more incoherent than at the beginning of the shift. At 02:30 patient attempted getting out of bed and fell. He was assisted back to bed. No physical harm noted. VS stable. Rapid response called because patient was slow to response. Ambien was given earlier. CT of head, blood gasses, chest X-ray, soft wrist restraint, or sitter ordered by Dr Mona Maki. Patient has been stable and rapid response called off.  Family was notified of the incident

## 2018-11-13 NOTE — PROGRESS NOTES
his brother; Heart Attack in his brother, father, and mother. 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    Plavix [Clopidogrel]      Blood in stool       Review of Systems:   All 14 systems were reviewed and are negative  Except for the positive findings  which as documented     /77   Pulse 70   Temp 97.8 °F (36.6 °C) (Axillary)   Resp 16   Ht 5' 6\" (1.676 m)   Wt 168 lb 6.4 oz (76.4 kg)   SpO2 98%   BMI 27.18 kg/m²       Intake/Output Summary (Last 24 hours) at 11/13/18 1247  Last data filed at 11/12/18 2354   Gross per 24 hour   Intake                0 ml   Output              975 ml   Net             -975 ml       Physical Exam:  Constitutional:  Well developed, ill looking, mild SOB   HENT:  Normocephalic, Atraumatic, Bilateral external ears normal, Oropharynx moist, No oral exudates, Nose normal.   Neck- No tenderness, Supple, No stridor. Eyes:  PERRL, EOMI, Conjunctiva normal, No discharge. Respiratory: decreased breath sounds, mild SOB  Cardiovascular:  Normal heart rate, Normal rhythm, no murmurs appreciated, No rubs appreciated, No gallops appreciated, JVP not elevated  Abdomen/GI:  Bowel sounds normal, Soft, + emesis / nausea  Musculoskeletal:  Intact distal pulses, trace edema lower legs, No tenderness, No cyanosis, No clubbing. Integument:  Warm, Dry, No erythema, No rash. Lymphatic:  No lymphadenopathy noted. Neurologic:  Alert, No focal deficits noted.    Psychiatric:  Affect and Mood :pleasant     Medications:    midodrine  5 mg Oral TID WC    apixaban  2.5 mg Oral BID    aspirin EC  81 mg Oral Daily    atorvastatin  80 mg Oral Nightly    baclofen  10 mg Oral TID    carvedilol  12.5 mg Oral BID WC    sacubitril-valsartan  1 tablet Oral BID    sodium chloride flush  10 mL Intravenous 2 times per day    furosemide  40 mg Intravenous BID       melatonin, HYDROcodone-acetaminophen, nitroGLYCERIN, zolpidem, sodium chloride flush, magnesium hydroxide, ondansetron    Lab Data:  CBC:   Recent Labs      11/11/18   1120  11/12/18   0512   WBC  7.7  6.6   HGB  12.2*  10.5*   HCT  43.4  37.4*   MCV  93.3  93.5   PLT  151  147     BMP:   Recent Labs      11/11/18   1120  11/12/18   0512  11/13/18   0326   NA  135  144  141   K  3.9  3.9  4.4   CL  101  107  105   CO2  22  27  23   BUN  36*  37*  43*   CREATININE  2.2*  2.1*  2.5*     PT/INR:   Recent Labs      11/11/18   1120   PROTIME  18.5*   INR  1.63     BNP:    Recent Labs      11/11/18   1120   PROBNP  24,810*     TROPONIN:   Recent Labs      11/11/18   1516  11/11/18   2131  11/12/18   0512   TROPONINT  <0.010  0.010*  <0.010        ECHO :   Echocardiogram 11/12/2018  Left ventricular function is moderately abnormal , EF is estimated at 20%.  Mild concentric left ventricular hypertrophy.   Grade II diastolic dysfunction.   Mildly dilated left atrium.   Right ventricular systolic pressure of 42 mm Hg suggestive of mild pulmonary   hypertension.   Moderate mitral regurgitation is present.   Mild tricuspid regurgitation.   No evidence of any pericardial effusion. Impression:  Principal Problem:    Acute on chronic systolic heart failure (HCC)  Resolved Problems:    * No resolved hospital problems. *       All labs, medications and tests reviewed by myself, continue all other medications of all above medical condition listed as is except for changes mentioned above. Thank you   Please call with questions. I have seen ,spoken to  and examined this patient personally, independently of the nurse practitioner. I have reviewed the hospital care given to date and reviewed all pertinent labs and imaging. The plan was developed mutually at the time of the visit with the patient,  NP  and myself. I have spoken with patient, nursing staff and provided written and verbal instructions . The above note has been reviewed and

## 2018-11-14 LAB
ANION GAP SERPL CALCULATED.3IONS-SCNC: 9 MMOL/L (ref 4–16)
BUN BLDV-MCNC: 40 MG/DL (ref 6–23)
CALCIUM SERPL-MCNC: 9.2 MG/DL (ref 8.3–10.6)
CHLORIDE BLD-SCNC: 106 MMOL/L (ref 99–110)
CO2: 31 MMOL/L (ref 21–32)
CREAT SERPL-MCNC: 2 MG/DL (ref 0.9–1.3)
GFR AFRICAN AMERICAN: 39 ML/MIN/1.73M2
GFR NON-AFRICAN AMERICAN: 32 ML/MIN/1.73M2
GLUCOSE BLD-MCNC: 132 MG/DL (ref 70–99)
MAGNESIUM: 2.6 MG/DL (ref 1.8–2.4)
POTASSIUM SERPL-SCNC: 3.7 MMOL/L (ref 3.5–5.1)
SODIUM BLD-SCNC: 146 MMOL/L (ref 135–145)

## 2018-11-14 PROCEDURE — 83735 ASSAY OF MAGNESIUM: CPT

## 2018-11-14 PROCEDURE — G8978 MOBILITY CURRENT STATUS: HCPCS

## 2018-11-14 PROCEDURE — G8979 MOBILITY GOAL STATUS: HCPCS

## 2018-11-14 PROCEDURE — 6370000000 HC RX 637 (ALT 250 FOR IP): Performed by: INTERNAL MEDICINE

## 2018-11-14 PROCEDURE — 2580000003 HC RX 258: Performed by: PHYSICIAN ASSISTANT

## 2018-11-14 PROCEDURE — 6360000002 HC RX W HCPCS: Performed by: PHYSICIAN ASSISTANT

## 2018-11-14 PROCEDURE — 97530 THERAPEUTIC ACTIVITIES: CPT

## 2018-11-14 PROCEDURE — 99231 SBSQ HOSP IP/OBS SF/LOW 25: CPT | Performed by: INTERNAL MEDICINE

## 2018-11-14 PROCEDURE — 80048 BASIC METABOLIC PNL TOTAL CA: CPT

## 2018-11-14 PROCEDURE — 6370000000 HC RX 637 (ALT 250 FOR IP): Performed by: PHYSICIAN ASSISTANT

## 2018-11-14 PROCEDURE — 2140000000 HC CCU INTERMEDIATE R&B

## 2018-11-14 PROCEDURE — 36415 COLL VENOUS BLD VENIPUNCTURE: CPT

## 2018-11-14 PROCEDURE — 97162 PT EVAL MOD COMPLEX 30 MIN: CPT

## 2018-11-14 RX ADMIN — MIDODRINE HYDROCHLORIDE 5 MG: 5 TABLET ORAL at 09:43

## 2018-11-14 RX ADMIN — HYDROCODONE BITARTRATE AND ACETAMINOPHEN 1 TABLET: 5; 325 TABLET ORAL at 03:01

## 2018-11-14 RX ADMIN — BACLOFEN 10 MG: 10 TABLET ORAL at 14:27

## 2018-11-14 RX ADMIN — FUROSEMIDE 40 MG: 10 INJECTION, SOLUTION INTRAMUSCULAR; INTRAVENOUS at 12:15

## 2018-11-14 RX ADMIN — APIXABAN 2.5 MG: 2.5 TABLET, FILM COATED ORAL at 09:42

## 2018-11-14 RX ADMIN — CARVEDILOL 12.5 MG: 12.5 TABLET, FILM COATED ORAL at 18:34

## 2018-11-14 RX ADMIN — ASPIRIN 81 MG: 81 TABLET, COATED ORAL at 09:43

## 2018-11-14 RX ADMIN — MIDODRINE HYDROCHLORIDE 5 MG: 5 TABLET ORAL at 18:34

## 2018-11-14 RX ADMIN — SACUBITRIL AND VALSARTAN 1 TABLET: 49; 51 TABLET, FILM COATED ORAL at 20:58

## 2018-11-14 RX ADMIN — APIXABAN 2.5 MG: 2.5 TABLET, FILM COATED ORAL at 20:57

## 2018-11-14 RX ADMIN — SODIUM CHLORIDE, PRESERVATIVE FREE 10 ML: 5 INJECTION INTRAVENOUS at 11:09

## 2018-11-14 RX ADMIN — BACLOFEN 10 MG: 10 TABLET ORAL at 09:43

## 2018-11-14 RX ADMIN — SACUBITRIL AND VALSARTAN 1 TABLET: 49; 51 TABLET, FILM COATED ORAL at 09:43

## 2018-11-14 RX ADMIN — MIDODRINE HYDROCHLORIDE 5 MG: 5 TABLET ORAL at 14:27

## 2018-11-14 RX ADMIN — ATORVASTATIN CALCIUM 80 MG: 40 TABLET, FILM COATED ORAL at 20:57

## 2018-11-14 RX ADMIN — SODIUM CHLORIDE, PRESERVATIVE FREE 10 ML: 5 INJECTION INTRAVENOUS at 20:58

## 2018-11-14 RX ADMIN — BACLOFEN 10 MG: 10 TABLET ORAL at 20:58

## 2018-11-14 RX ADMIN — FUROSEMIDE 40 MG: 10 INJECTION, SOLUTION INTRAMUSCULAR; INTRAVENOUS at 20:58

## 2018-11-14 ASSESSMENT — PAIN SCALES - GENERAL
PAINLEVEL_OUTOF10: 0
PAINLEVEL_OUTOF10: 3

## 2018-11-14 NOTE — PROGRESS NOTES
(spouse and son (son works))  Type of Home: [address]: One level (ranch with basement)  Home Access: Stairs to enter without rails  Entrance Stairs - Number of Steps: 1  Home Equipment: Joselyn Siegel, Oxygen (wife has a WC that is use for comunity mobility ; 2L O2)  ADL Assistance: Independent  Homemaking Assistance: Independent  Laundry: Minimal  Homemaking Responsibilities: No (son performs most; pt can manage lunch and small tasks)  Ambulation Assistance: Independent (w/ cane)  Transfer Assistance: Independent  Active : Yes  Additional Comments: son present and main historian due to pt having difficulty staying aroused and somewhat disoriented; Son who lives with pt and his spouse works during the day; pt and wife alone at times; wife in fair health but does need occasional WC use for longer distances. [facility] nurse comes 1x per for vital assessment; Son checks pts vitals daily.    Cognition        Objective     Observation/Palpation  Posture: Fair    AROM RLE (degrees)  RLE AROM: WNL  AROM LLE (degrees)  LLE AROM : WNL  Strength Other  Other: difficult to comprehend MMT, observed with bed mobility and at least 3+/5   Motor control: Dec coordination to UEs>LEs  Sensation  Overall Sensation Status: WNL  Bed mobility  Bridging: Minimal assistance  Rolling to Right: Minimal assistance  Supine to Sit: Minimal assistance  Sit to Supine: Minimal assistance  Scooting: Minimal assistance  Comment: unsteady in sitting; VCs for sequencing, able to self advance BLES needing assist with trunk management and guidance  Transfers  Comment: Did not assess due to drop in SPO2, poor trunk control, unsteady in sitting, and son requesting pt not transfer out of bed at this time due to lethargy   Ambulation  Ambulation?: No     Balance  Posture: Fair  Sitting - Static: Fair;Poor  Sitting - Dynamic: Poor  Comments: moderate VCs for posture and positioning, fwd trunk/head with poor trunk control         Assessment Body structures, Functions, Activity limitations: Decreased functional mobility ; Decreased strength;Decreased safe awareness;Decreased balance;Decreased endurance  Assessment: Pt is an 80year old female admitted for acute on chronic systolic heart failure. Recommend subacute rehab once medically stable. Pt prior to admission was indep with cane living at home with spouse and son. Son is supportive but works, unable to provide 24/7 care. Pt currently is Gabriel for bed mobility with poor activity tolerance, balance, trunk control, and muscular endurance. Unable to tolerate transfers/gait at this time. Would benefit from contined therapy in subacute rehab to address deficits and return to PLOF. Treatment Diagnosis: acute on chronic systolic heart failure   Prognosis: Good  Decision Making: Medium Complexity  Patient Education: POC, role of PT, breathing techniques   REQUIRES PT FOLLOW UP: Yes  Activity Tolerance  Activity Tolerance: Patient limited by fatigue;Patient limited by endurance  Activity Tolerance: Sat EOB for 7 minutes on 4L O2; drop in SpO2 to 80% by end of 7 minutes. Able to maintain above 90% for ~4 minutes before slow decline. Unable to recover with breathing techniques in sitting. Full recovered to 99% in semi reclined position in bed         Plan   Plan  Times per week: 4  Times per day: Daily  Plan weeks: 1  Current Treatment Recommendations: Strengthening, Balance Training, Functional Mobility Training, Transfer Training, Gait Training, Stair training, Home Exercise Program, Safety Education & Training, Patient/Caregiver Education & Training, Equipment Evaluation, Education, & procurement, Positioning  Safety Devices  Type of devices: All fall risk precautions in place, Call light within reach, Bed alarm in place    G-Code  PT G-Codes  Mobility: Walking and Moving Around Current Status ():  At least 60 percent but less than 80 percent impaired, limited or restricted  Mobility: Walking and

## 2018-11-14 NOTE — PROGRESS NOTES
Hospitalist Progress Note      Name:  Roxane Goncalves /Age/Sex: 1933  (80 y.o. male)   MRN & CSN:  [id number] & [id number] Admission Date/Time: 2018 10:33 AM   Location:  [facility] PCP: Katy Upton MD         Hospital Day: 4    Assessment and Plan:   Roxane Goncalves is a 80 y.o.  male  who presents with progressive SOB       Acute on chronic systolic HF exacerbation  EF per last Cardiolite 12% with large infarcts  Continue Coreg and Entersto  Continue Lasix 40 mg IV BID                Acute LBP   consider strain/sprain, recent PCP visit  continue Baclofen, [place] PRN  PT / OT to assist with exercises     Acute on chronic respiratory failure with hypoxia  He is on 2 L oxygen at home  He was short of breath & hypoxic on admit  He has improved    Bleeding from external ear canal  Ear exam showing scanty blood in external ear canal  Tympanic membrane intact  Consider     CKD stage III- stable, SCr at baseline 2.2  avoid nephrotoxic agents (Naproxen for recent LBP) and renally dose all meds  NSAID use can precipitate an admission for him  - SCr is 2.1, monitor     CAD with ischemic CM s/p pacemaker and BiV ICD given HFrEF-    tele and continue ASA, Eliquis, Entresto, Coreg,      PAF  stable, on chronic AC-->Eliquis  continue and tele     HTN- stable and controled, continue Entresto, Coreg and monitor BP     HLD- stable, continue statin     Anemia of chronic disease- stable      Diet DIET LOW SODIUM 2 GM;  Dietary Nutrition Supplements: Standard High Calorie Oral Supplement  Dietary Nutrition Supplements: Standard High Calorie Oral Supplement   DVT Prophylaxis [] eliquis   GI Prophylaxis [] PPI, [] H2 Blocker, [x] No GI prophylaxis, patient is receiving diet/Tube Feeds   Code Status Full Code     History of Present Illness:     Pt S&E. Son and wife at bedside, patient seems very weak and tired, falls a sleep during encounter, states his breathing is better. Denies chest pain.  Son wants to

## 2018-11-14 NOTE — PROGRESS NOTES
follows     CARDIOLOGY ATTENDING ADDENDUM    HPI:  I have reviewed the above HPI  And agree with above   Moises Scott is a 80 y. o.year old who and presents with had concerns including Chest Pain (left sided CP since last night with increasing SOB ). Chief Complaint   Patient presents with    Chest Pain     left sided CP since last night with increasing SOB      Interval history:  Feels fatigued    Physical Exam:  General:  Awake, alert, NAD  Head:normal  Eye:normal  Neck:  No JVD   Chest:  Clear to auscultation, respiration easy  Cardiovascular:  RRR S1S2  Abdomen:   nontender  Extremities:  tr edema  Pulses; palpable  Neuro: grossly normal      MEDICAL DECISION MAKING;    I agree with the above plan, which was planned by myself and discussed with NP.   Stable hemodynamics  cpm        Mayuri Blankenship MD [facility]

## 2018-11-15 ENCOUNTER — APPOINTMENT (OUTPATIENT)
Dept: GENERAL RADIOLOGY | Age: 83
DRG: 291 | End: 2018-11-15
Payer: COMMERCIAL

## 2018-11-15 LAB
ANION GAP SERPL CALCULATED.3IONS-SCNC: 12 MMOL/L (ref 4–16)
BUN BLDV-MCNC: 34 MG/DL (ref 6–23)
CALCIUM SERPL-MCNC: 9.3 MG/DL (ref 8.3–10.6)
CHLORIDE BLD-SCNC: 106 MMOL/L (ref 99–110)
CO2: 27 MMOL/L (ref 21–32)
CREAT SERPL-MCNC: 1.8 MG/DL (ref 0.9–1.3)
GFR AFRICAN AMERICAN: 44 ML/MIN/1.73M2
GFR NON-AFRICAN AMERICAN: 36 ML/MIN/1.73M2
GLUCOSE BLD-MCNC: 123 MG/DL (ref 70–99)
MAGNESIUM: 2.5 MG/DL (ref 1.8–2.4)
POTASSIUM SERPL-SCNC: 3.8 MMOL/L (ref 3.5–5.1)
SODIUM BLD-SCNC: 145 MMOL/L (ref 135–145)

## 2018-11-15 PROCEDURE — 36415 COLL VENOUS BLD VENIPUNCTURE: CPT

## 2018-11-15 PROCEDURE — G8996 SWALLOW CURRENT STATUS: HCPCS

## 2018-11-15 PROCEDURE — 80048 BASIC METABOLIC PNL TOTAL CA: CPT

## 2018-11-15 PROCEDURE — 99232 SBSQ HOSP IP/OBS MODERATE 35: CPT | Performed by: INTERNAL MEDICINE

## 2018-11-15 PROCEDURE — 6370000000 HC RX 637 (ALT 250 FOR IP): Performed by: PHYSICIAN ASSISTANT

## 2018-11-15 PROCEDURE — 2700000000 HC OXYGEN THERAPY PER DAY

## 2018-11-15 PROCEDURE — 6360000002 HC RX W HCPCS: Performed by: PHYSICIAN ASSISTANT

## 2018-11-15 PROCEDURE — G8987 SELF CARE CURRENT STATUS: HCPCS

## 2018-11-15 PROCEDURE — 97530 THERAPEUTIC ACTIVITIES: CPT

## 2018-11-15 PROCEDURE — 71045 X-RAY EXAM CHEST 1 VIEW: CPT

## 2018-11-15 PROCEDURE — 94761 N-INVAS EAR/PLS OXIMETRY MLT: CPT

## 2018-11-15 PROCEDURE — 2580000003 HC RX 258: Performed by: PHYSICIAN ASSISTANT

## 2018-11-15 PROCEDURE — G8997 SWALLOW GOAL STATUS: HCPCS

## 2018-11-15 PROCEDURE — 97167 OT EVAL HIGH COMPLEX 60 MIN: CPT

## 2018-11-15 PROCEDURE — 6370000000 HC RX 637 (ALT 250 FOR IP): Performed by: INTERNAL MEDICINE

## 2018-11-15 PROCEDURE — G8988 SELF CARE GOAL STATUS: HCPCS

## 2018-11-15 PROCEDURE — 92610 EVALUATE SWALLOWING FUNCTION: CPT

## 2018-11-15 PROCEDURE — 97535 SELF CARE MNGMENT TRAINING: CPT

## 2018-11-15 PROCEDURE — G8998 SWALLOW D/C STATUS: HCPCS

## 2018-11-15 PROCEDURE — 83735 ASSAY OF MAGNESIUM: CPT

## 2018-11-15 PROCEDURE — 2140000000 HC CCU INTERMEDIATE R&B

## 2018-11-15 PROCEDURE — 99221 1ST HOSP IP/OBS SF/LOW 40: CPT | Performed by: FAMILY MEDICINE

## 2018-11-15 RX ORDER — FUROSEMIDE 40 MG/1
40 TABLET ORAL 2 TIMES DAILY
Status: DISCONTINUED | OUTPATIENT
Start: 2018-11-15 | End: 2018-11-19 | Stop reason: HOSPADM

## 2018-11-15 RX ADMIN — ONDANSETRON 4 MG: 2 INJECTION, SOLUTION INTRAMUSCULAR; INTRAVENOUS at 04:49

## 2018-11-15 RX ADMIN — MIDODRINE HYDROCHLORIDE 5 MG: 5 TABLET ORAL at 10:44

## 2018-11-15 RX ADMIN — MELATONIN TAB 3 MG 6 MG: 3 TAB at 02:48

## 2018-11-15 RX ADMIN — APIXABAN 2.5 MG: 2.5 TABLET, FILM COATED ORAL at 10:44

## 2018-11-15 RX ADMIN — CARVEDILOL 12.5 MG: 12.5 TABLET, FILM COATED ORAL at 10:44

## 2018-11-15 RX ADMIN — CARVEDILOL 12.5 MG: 12.5 TABLET, FILM COATED ORAL at 21:12

## 2018-11-15 RX ADMIN — APIXABAN 2.5 MG: 2.5 TABLET, FILM COATED ORAL at 21:11

## 2018-11-15 RX ADMIN — BACLOFEN 10 MG: 10 TABLET ORAL at 14:44

## 2018-11-15 RX ADMIN — BACLOFEN 10 MG: 10 TABLET ORAL at 21:11

## 2018-11-15 RX ADMIN — SACUBITRIL AND VALSARTAN 1 TABLET: 49; 51 TABLET, FILM COATED ORAL at 12:54

## 2018-11-15 RX ADMIN — MELATONIN TAB 3 MG 6 MG: 3 TAB at 21:11

## 2018-11-15 RX ADMIN — BACLOFEN 10 MG: 10 TABLET ORAL at 10:44

## 2018-11-15 RX ADMIN — SACUBITRIL AND VALSARTAN 1 TABLET: 49; 51 TABLET, FILM COATED ORAL at 21:18

## 2018-11-15 RX ADMIN — ATORVASTATIN CALCIUM 80 MG: 40 TABLET, FILM COATED ORAL at 21:11

## 2018-11-15 RX ADMIN — ASPIRIN 81 MG: 81 TABLET, COATED ORAL at 10:40

## 2018-11-15 RX ADMIN — MIDODRINE HYDROCHLORIDE 5 MG: 5 TABLET ORAL at 14:45

## 2018-11-15 RX ADMIN — SODIUM CHLORIDE, PRESERVATIVE FREE 10 ML: 5 INJECTION INTRAVENOUS at 21:11

## 2018-11-15 RX ADMIN — FUROSEMIDE 40 MG: 10 INJECTION, SOLUTION INTRAMUSCULAR; INTRAVENOUS at 10:13

## 2018-11-15 RX ADMIN — SODIUM CHLORIDE, PRESERVATIVE FREE 10 ML: 5 INJECTION INTRAVENOUS at 10:14

## 2018-11-15 RX ADMIN — FUROSEMIDE 40 MG: 40 TABLET ORAL at 18:25

## 2018-11-15 RX ADMIN — MIDODRINE HYDROCHLORIDE 5 MG: 5 TABLET ORAL at 18:25

## 2018-11-15 ASSESSMENT — PAIN SCALES - GENERAL
PAINLEVEL_OUTOF10: 0

## 2018-11-15 NOTE — PROGRESS NOTES
Physical Therapy    Physical Therapy Treatment Note  Name: Terri Peters MRN: [id number] :   1933   Date:  11/15/2018   Admission Date: 2018 Room:  [facility]3/3123-A   Restrictions/Precautions:  Restrictions/Precautions  Restrictions/Precautions: General Precautions, Fall Risk       Communication with other providers:  Nursing in with pt upon arrival and reported pt was okay for therapy once done with toileting. Subjective:  Patient states:  \"I feel good\". Agreeable to get up with therapy. Pain:   Location, Type, Intensity (0/10 to 10/10): Denies   Objective:    Observation:    Pt resting in bed with HOB elevated. Last BP taken in sitting 108/67, standing 96/68. Report of light headedness that seemed to subside with prolonged time in that position. Agreeable and cooperative with therapy. Son in room upon arrival but stated he would leave to allow \"therapy to do their job\". Returned at end of session to assist pt with lunch. Treatment, including education/measures:  Pt provided with verbal/tactile cues and demonstration for mobility tasks and safe use of AD. Pt performed supine>sit Gabriel for uprighting trunk w/ cues for sequencing. Inc time needed. Pt sat EOB for ~ 7 minutes with intermittent Gabriel needed for balance. Pt performed sit><stand Gabriel/modA for balance and eccentric control lowering to seat surface. Dec ability to follow cues for hand sequencing when sitting. Gabriel for steadying with ambulating ~3ft with RW and sequencing step pivot turn with RW to recliner. Assessment / Impression:    Overall better posture and trunk control in sitting but still had times of lethargy and difficulty maintaining upright posture. Able to tolerate transfers/short ambulation with SpO2 remaining above 90% today on 4L. Would continue to recommend subacute rehab once medically stable to address deficits and return to PLOF.    Patient's tolerance of treatment:  Good  Adverse Reaction: othostatic   Significant

## 2018-11-15 NOTE — PROGRESS NOTES
chloride flush, magnesium hydroxide, ondansetron    Lab Data:  CBC:   No results for input(s): WBC, HGB, HCT, MCV, PLT in the last 72 hours. BMP:   Recent Labs      11/13/18   0326  11/14/18   0518  11/15/18   0432   NA  141  146*  145   K  4.4  3.7  3.8   CL  105  106  106   CO2  23  31  27   BUN  43*  40*  34*   CREATININE  2.5*  2.0*  1.8*     PT/INR:   No results for input(s): PROTIME, INR in the last 72 hours. BNP:    No results for input(s): PROBNP in the last 72 hours. TROPONIN:   No results for input(s): TROPONINT in the last 72 hours. ECHO :   Echocardiogram 11/12/2018  Left ventricular function is moderately abnormal , EF is estimated at 20%.  Mild concentric left ventricular hypertrophy.   Grade II diastolic dysfunction.   Mildly dilated left atrium.   Right ventricular systolic pressure of 42 mm Hg suggestive of mild pulmonary   hypertension.   Moderate mitral regurgitation is present.   Mild tricuspid regurgitation.   No evidence of any pericardial effusion. Impression:  Principal Problem:    Acute on chronic systolic heart failure (HCC)  Resolved Problems:    * No resolved hospital problems. *       All labs, medications and tests reviewed by myself, continue all other medications of all above medical condition listed as is except for changes mentioned above. Thank you   Please call with questions. I have seen ,spoken to  and examined this patient personally, independently of the nurse practitioner. I have reviewed the hospital care given to date and reviewed all pertinent labs and imaging. The plan was developed mutually at the time of the visit with the patient,  NP  and myself. I have spoken with patient, nursing staff and provided written and verbal instructions . The above note has been reviewed and I agree with the assessment, diagnosis, and treatment plan with changes made by me as follows     CARDIOLOGY ATTENDING ADDENDUM    HPI:  I have reviewed the above HPI  And agree with

## 2018-11-15 NOTE — PROGRESS NOTES
and hand over hand to begin task)  Grooming: Minimal assistance;Setup (for thoroughness with facial hygiene)  UE Bathing: Moderate assistance;Setup  LE Bathing: Maximum assistance;Setup  UE Dressing: Dependent/Total;Setup (for donning clean robe)  LE Dressing: Dependent/Total;Setup (for donning socks)  Toileting: Dependent/Total;Setup  Additional Comments: Full ADL performance based on functional observation this date       Tone RUE  RUE Tone: Normotonic  Tone LUE  LUE Tone: Normotonic  Coordination  Movements Are Fluid And Coordinated: No  Coordination and Movement description: Fine motor impairments;Gross motor impairments;Decreased accuracy; Right UE;Left UE (very poor motor planning with self-feeding)        Bed mobility  Supine to Sit: Minimal assistance (with HOB elevated, extended time required, mod coaching for scooting legs/use of bed features)  Transfers  Sit to stand: Moderate assistance (with mod coaching for scooting hips forward and pushing through arms)  Stand to sit: Minimal assistance (with min coaching for feeling legs against chair and reaching back with arms)        Cognition  Overall Cognitive Status: Impaired  Arousal/Alertness: Delayed responses to stimuli  Following Commands: Inconsistently follows commands (Followed grossly 2/3 of basic one step commands this date)  Attention Span: Difficulty attending to directions; Difficulty dividing attention; Attends with cues to redirect  Memory: Decreased recall of biographical Information;Decreased recall of recent events;Decreased short term memory;Decreased long term memory  Safety Judgement: Decreased awareness of need for assistance;Decreased awareness of need for safety  Problem Solving: Assistance required to identify errors made;Assistance required to correct errors made;Decreased awareness of errors  Insights: Decreased awareness of deficits  Initiation: Requires cues for some  Sequencing: Requires cues for some           Sensation  Overall

## 2018-11-15 NOTE — PROGRESS NOTES
bedside, patient seems confused, very emotional, asking about his wife. Family deciding on SNF placement. Ten point ROS reviewed negative, unless as noted above    Objective:   No intake or output data in the 24 hours ending 11/15/18 1453   Vitals:   Vitals:    11/15/18 1040   BP: 108/67   Pulse: 71   Resp:    Temp:    SpO2:      Physical Exam:    GEN Awake, in emotional distress  EYES Pupils are equally round. No scleral erythema, discharge, or conjunctivitis. HENT Mucous membranes are moist.   NECK No apparent thyromegaly or masses. RESP Diminished air entry, no rales or rhonchi. CARDIO/VASC S1/S2 auscultated. Regular rate without appreciable murmurs, rubs, or gallops. No peripheral edema. GI Abdomen is soft without significant tenderness, masses, or guarding. Bowel sounds are normoactive. MSK No gross joint deformities. Spontaneous movement of all extremities  SKIN Normal coloration, warm, dry. NEURO Cranial nerves appear grossly intact, normal speech, no lateralizing weakness.       Medications:   Medications:    furosemide  40 mg Oral BID    midodrine  5 mg Oral TID WC    apixaban  2.5 mg Oral BID    aspirin EC  81 mg Oral Daily    atorvastatin  80 mg Oral Nightly    baclofen  10 mg Oral TID    carvedilol  12.5 mg Oral BID WC    sacubitril-valsartan  1 tablet Oral BID    sodium chloride flush  10 mL Intravenous 2 times per day      Infusions:   PRN Meds:     melatonin 6 mg Nightly PRN   HYDROcodone-acetaminophen 1 tablet Q6H PRN   nitroGLYCERIN 0.4 mg Q5 Min PRN   zolpidem 5 mg Nightly PRN   sodium chloride flush 10 mL PRN   magnesium hydroxide 30 mL Daily PRN   ondansetron 4 mg Q6H PRN         Electronically signed by Christine Blanton MD on 11/15/2018 at 2:53 PM'

## 2018-11-16 LAB
ANION GAP SERPL CALCULATED.3IONS-SCNC: 9 MMOL/L (ref 4–16)
BUN BLDV-MCNC: 32 MG/DL (ref 6–23)
CALCIUM SERPL-MCNC: 9 MG/DL (ref 8.3–10.6)
CHLORIDE BLD-SCNC: 102 MMOL/L (ref 99–110)
CO2: 31 MMOL/L (ref 21–32)
CREAT SERPL-MCNC: 1.9 MG/DL (ref 0.9–1.3)
GFR AFRICAN AMERICAN: 41 ML/MIN/1.73M2
GFR NON-AFRICAN AMERICAN: 34 ML/MIN/1.73M2
GLUCOSE BLD-MCNC: 94 MG/DL (ref 70–99)
MAGNESIUM: 2.5 MG/DL (ref 1.8–2.4)
POTASSIUM SERPL-SCNC: 3.8 MMOL/L (ref 3.5–5.1)
SODIUM BLD-SCNC: 142 MMOL/L (ref 135–145)

## 2018-11-16 PROCEDURE — 99231 SBSQ HOSP IP/OBS SF/LOW 25: CPT | Performed by: INTERNAL MEDICINE

## 2018-11-16 PROCEDURE — 36415 COLL VENOUS BLD VENIPUNCTURE: CPT

## 2018-11-16 PROCEDURE — 6370000000 HC RX 637 (ALT 250 FOR IP): Performed by: PHYSICIAN ASSISTANT

## 2018-11-16 PROCEDURE — 83735 ASSAY OF MAGNESIUM: CPT

## 2018-11-16 PROCEDURE — 2580000003 HC RX 258: Performed by: PHYSICIAN ASSISTANT

## 2018-11-16 PROCEDURE — 99231 SBSQ HOSP IP/OBS SF/LOW 25: CPT | Performed by: FAMILY MEDICINE

## 2018-11-16 PROCEDURE — 97530 THERAPEUTIC ACTIVITIES: CPT

## 2018-11-16 PROCEDURE — 80048 BASIC METABOLIC PNL TOTAL CA: CPT

## 2018-11-16 PROCEDURE — 2140000000 HC CCU INTERMEDIATE R&B

## 2018-11-16 PROCEDURE — 2700000000 HC OXYGEN THERAPY PER DAY

## 2018-11-16 PROCEDURE — 6370000000 HC RX 637 (ALT 250 FOR IP): Performed by: INTERNAL MEDICINE

## 2018-11-16 RX ORDER — BACLOFEN 10 MG/1
10 TABLET ORAL 3 TIMES DAILY PRN
Status: DISCONTINUED | OUTPATIENT
Start: 2018-11-16 | End: 2018-11-19 | Stop reason: HOSPADM

## 2018-11-16 RX ADMIN — FUROSEMIDE 40 MG: 40 TABLET ORAL at 16:29

## 2018-11-16 RX ADMIN — MIDODRINE HYDROCHLORIDE 5 MG: 5 TABLET ORAL at 10:37

## 2018-11-16 RX ADMIN — SACUBITRIL AND VALSARTAN 1 TABLET: 49; 51 TABLET, FILM COATED ORAL at 10:37

## 2018-11-16 RX ADMIN — ASPIRIN 81 MG: 81 TABLET, COATED ORAL at 10:38

## 2018-11-16 RX ADMIN — CARVEDILOL 12.5 MG: 12.5 TABLET, FILM COATED ORAL at 16:28

## 2018-11-16 RX ADMIN — ATORVASTATIN CALCIUM 80 MG: 40 TABLET, FILM COATED ORAL at 21:58

## 2018-11-16 RX ADMIN — SODIUM CHLORIDE, PRESERVATIVE FREE 10 ML: 5 INJECTION INTRAVENOUS at 22:04

## 2018-11-16 RX ADMIN — MIDODRINE HYDROCHLORIDE 5 MG: 5 TABLET ORAL at 16:29

## 2018-11-16 RX ADMIN — CARVEDILOL 12.5 MG: 12.5 TABLET, FILM COATED ORAL at 10:38

## 2018-11-16 RX ADMIN — APIXABAN 2.5 MG: 2.5 TABLET, FILM COATED ORAL at 21:58

## 2018-11-16 RX ADMIN — APIXABAN 2.5 MG: 2.5 TABLET, FILM COATED ORAL at 10:38

## 2018-11-16 RX ADMIN — FUROSEMIDE 40 MG: 40 TABLET ORAL at 10:38

## 2018-11-16 RX ADMIN — SODIUM CHLORIDE, PRESERVATIVE FREE 10 ML: 5 INJECTION INTRAVENOUS at 10:38

## 2018-11-16 RX ADMIN — BACLOFEN 10 MG: 10 TABLET ORAL at 10:38

## 2018-11-16 ASSESSMENT — PAIN SCALES - GENERAL
PAINLEVEL_OUTOF10: 0

## 2018-11-16 NOTE — PROGRESS NOTES
One level (ranch with basement)  Home Access: Stairs to enter without rails  Entrance Stairs - Number of Steps: 1  Bathroom Shower/Tub: Tub/Shower unit (pt reports he stands to shower)  Bathroom Toilet: Handicap height  Bathroom Equipment: Grab bars in shower  Bathroom Accessibility: Accessible  Home Equipment: Cane, Wheelchair-manual, Oxygen (wife has a WC that is use for comunity mobility ; 2L O2)  ADL Assistance: Independent  Homemaking Assistance: Independent  Laundry: Minimal  Homemaking Responsibilities: No (son performs most; pt can manage lunch and small tasks)  Ambulation Assistance: Independent (w/ cane)  Transfer Assistance: Independent  Active : Yes  Occupation: Retired  Additional Comments: son present and main historian due to pt having difficulty staying aroused and somewhat disoriented; Son who lives with pt and his spouse works during the day; pt and wife alone at times; wife in fair health but does need occasional WC use for longer distances. [address] nurse comes 1x per for vital assessment; Son checks pts vitals daily.    Short term goals  Time Frame for Short term goals: 7-10 days   Short term goal 1: Pt will perform bilat rolling SBA   Short term goal 2: Pt will perform sit><supine SBA   Short term goal 3: Pt will transfer to recliner/bed with Gabriel   Short term goal 4: Pt will sit EOB for 5 minutes SBA   Short term goal 5: Pt will ambulate 20ft with LRAD CGA   Long term goals  Time Frame for Long term goals : LTGS=STGs due to anticipated short length of stay     Electronically signed by:    Jory Bledsoe PTA [id number]

## 2018-11-16 NOTE — CARE COORDINATION
MARLEEW received a call from [facility] with [facility] and they can take pt once he is medically stable. E-PASS completed and submitted to [facility]. Copy of PASS placed in NH packet. CM will need a SHAUN completed prior to discharge by both RN and doctor. If pt is discharged after hours please complete the following. ... Call report to [phone]  Fax completed AVS with both SHAUN on the AVS and any written Rx [phone]. Set up transportation with [employer] due to pt having [employer]. [phone]. They will usually contract with [employer] but they have to give permission for this.

## 2018-11-16 NOTE — PROGRESS NOTES
Cardiology Progress Note     Today's Plan:    Admit Date:  11/11/2018    Consult reason/ Seen today for:  CHF    Subjective and  Overnight Events: Family at bedside- He wakes up and becomes tearful. His breathing is easier. There is no reported chest pain. [facility]     Telemetry AV paced    Assessment / Plan / Recommendation:     HFrEF: decompensated heart failure. Negative fluid status of 2L since admit. IV lasix has rashi changed to po - continue b- blocker and ARNI. Daily weights, strict Is and Os-   Cardiomyopathy- Echo with EF <25%- NM stress test with EF of 12% severe global hypokinesis:  Bi V AICD in place-  Continue with coreg- entresto- lasix    Hypotension; - On ProAmatine -  ASCVD H/O PCI - recent NM with large area of infarct- no ischemia; troponin are negative   Medical management - BB blocker- ASA- coreg-  BIV ICD-  Stable    PAF  On renal dose eliquis       History of Presenting Illness:    Chief complain on admission : 80 y. o.year old who is admitted for  Chief Complaint   Patient presents with    Chest Pain     left sided CP since last night with increasing SOB         Past medical history:    has a past medical history of Additional heart attack (anterior wall); CAD (coronary artery disease); CHF (congestive heart failure) ([address] Heart imaging; History of cardiovascular stress test; History of echocardiogram; History of left heart catheterization; Hyperlipidemia; Hypertension; ICD (implantable cardioverter-defibrillator), biventricular, in situ; PAF (paroxysmal atrial fibrillation) ([address] and Shingles. Past surgical history:   has a past surgical history that includes Total knee arthroplasty (8087-1589); Percutaneous Transluminal Coronary Angio (8/13/98); and Cardiac defibrillator placement (Left, 03/20/2017). Social History:   reports that he has quit smoking.  He has never used smokeless tobacco. He

## 2018-11-17 PROCEDURE — 97116 GAIT TRAINING THERAPY: CPT

## 2018-11-17 PROCEDURE — 6370000000 HC RX 637 (ALT 250 FOR IP): Performed by: PHYSICIAN ASSISTANT

## 2018-11-17 PROCEDURE — 97110 THERAPEUTIC EXERCISES: CPT

## 2018-11-17 PROCEDURE — 2140000000 HC CCU INTERMEDIATE R&B

## 2018-11-17 PROCEDURE — 2580000003 HC RX 258: Performed by: PHYSICIAN ASSISTANT

## 2018-11-17 PROCEDURE — 99232 SBSQ HOSP IP/OBS MODERATE 35: CPT | Performed by: INTERNAL MEDICINE

## 2018-11-17 PROCEDURE — 97530 THERAPEUTIC ACTIVITIES: CPT

## 2018-11-17 PROCEDURE — 6370000000 HC RX 637 (ALT 250 FOR IP): Performed by: INTERNAL MEDICINE

## 2018-11-17 RX ADMIN — FUROSEMIDE 40 MG: 40 TABLET ORAL at 09:37

## 2018-11-17 RX ADMIN — MIDODRINE HYDROCHLORIDE 5 MG: 5 TABLET ORAL at 16:51

## 2018-11-17 RX ADMIN — ASPIRIN 81 MG: 81 TABLET, COATED ORAL at 09:38

## 2018-11-17 RX ADMIN — ATORVASTATIN CALCIUM 80 MG: 40 TABLET, FILM COATED ORAL at 21:33

## 2018-11-17 RX ADMIN — APIXABAN 2.5 MG: 2.5 TABLET, FILM COATED ORAL at 21:33

## 2018-11-17 RX ADMIN — SODIUM CHLORIDE, PRESERVATIVE FREE 10 ML: 5 INJECTION INTRAVENOUS at 09:36

## 2018-11-17 RX ADMIN — CARVEDILOL 12.5 MG: 12.5 TABLET, FILM COATED ORAL at 16:51

## 2018-11-17 RX ADMIN — FUROSEMIDE 40 MG: 40 TABLET ORAL at 16:51

## 2018-11-17 RX ADMIN — MIDODRINE HYDROCHLORIDE 5 MG: 5 TABLET ORAL at 09:37

## 2018-11-17 RX ADMIN — CARVEDILOL 12.5 MG: 12.5 TABLET, FILM COATED ORAL at 09:37

## 2018-11-17 RX ADMIN — SACUBITRIL AND VALSARTAN 1 TABLET: 49; 51 TABLET, FILM COATED ORAL at 21:33

## 2018-11-17 RX ADMIN — APIXABAN 2.5 MG: 2.5 TABLET, FILM COATED ORAL at 09:37

## 2018-11-17 RX ADMIN — MIDODRINE HYDROCHLORIDE 5 MG: 5 TABLET ORAL at 13:12

## 2018-11-17 RX ADMIN — SODIUM CHLORIDE, PRESERVATIVE FREE 10 ML: 5 INJECTION INTRAVENOUS at 21:37

## 2018-11-17 RX ADMIN — SACUBITRIL AND VALSARTAN 1 TABLET: 49; 51 TABLET, FILM COATED ORAL at 10:26

## 2018-11-17 NOTE — PLAN OF CARE
Problem: Falls - Risk of:  Goal: Will remain free from falls  Will remain free from falls   Outcome: Ongoing    Goal: Absence of physical injury  Absence of physical injury   Outcome: Ongoing      Problem: OXYGENATION/RESPIRATORY FUNCTION  Goal: Patient will maintain patent airway  Outcome: Ongoing    Goal: Patient will achieve/maintain normal respiratory rate/effort  Respiratory rate and effort will be within normal limits for the patient   Outcome: Ongoing      Problem: HEMODYNAMIC STATUS  Goal: Patient has stable vital signs and fluid balance  Outcome: Ongoing      Problem: FLUID AND ELECTROLYTE IMBALANCE  Goal: Fluid and electrolyte balance are achieved/maintained  Outcome: Ongoing

## 2018-11-17 NOTE — PROGRESS NOTES
Mikie Leyva (wife has a WC that is use for comunity mobility ; 2L O2)  ADL Assistance: Independent  Homemaking Assistance: Independent  Laundry: Minimal  Homemaking Responsibilities: No (son performs most; pt can manage lunch and small tasks)  Ambulation Assistance: Independent (w/ cane)  Transfer Assistance: Independent  Active : Yes  Occupation: Retired  Additional Comments: son present and main historian due to pt having difficulty staying aroused and somewhat disoriented; Son who lives with pt and his spouse works during the day; pt and wife alone at times; wife in fair health but does need occasional WC use for longer distances. Justin [employer] nurse comes 1x per for vital assessment; Son checks pts vitals daily.    Short term goals  Time Frame for Short term goals: 7-10 days   Short term goal 1: Pt will perform bilat rolling SBA   Short term goal 2: Pt will perform sit><supine SBA   Short term goal 3: Pt will transfer to recliner/bed with Gabriel   Short term goal 4: Pt will sit EOB for 5 minutes SBA   Short term goal 5: Pt will ambulate 20ft with LRAD CGA   Long term goals  Time Frame for Long term goals : LTGS=STGs due to anticipated short length of stay     Electronically signed by:    Wilberto Barr, CHRIS 11/17/18 11/17/2018,12:53 PM

## 2018-11-18 PROCEDURE — 6370000000 HC RX 637 (ALT 250 FOR IP): Performed by: INTERNAL MEDICINE

## 2018-11-18 PROCEDURE — 2580000003 HC RX 258: Performed by: PHYSICIAN ASSISTANT

## 2018-11-18 PROCEDURE — 99232 SBSQ HOSP IP/OBS MODERATE 35: CPT | Performed by: INTERNAL MEDICINE

## 2018-11-18 PROCEDURE — 2140000000 HC CCU INTERMEDIATE R&B

## 2018-11-18 PROCEDURE — 97116 GAIT TRAINING THERAPY: CPT

## 2018-11-18 PROCEDURE — 94761 N-INVAS EAR/PLS OXIMETRY MLT: CPT

## 2018-11-18 PROCEDURE — 6370000000 HC RX 637 (ALT 250 FOR IP): Performed by: HOSPITALIST

## 2018-11-18 PROCEDURE — 6370000000 HC RX 637 (ALT 250 FOR IP): Performed by: PHYSICIAN ASSISTANT

## 2018-11-18 RX ORDER — SODIUM PHOSPHATE, DIBASIC AND SODIUM PHOSPHATE, MONOBASIC 7; 19 G/133ML; G/133ML
1 ENEMA RECTAL
Status: ACTIVE | OUTPATIENT
Start: 2018-11-18 | End: 2018-11-18

## 2018-11-18 RX ORDER — MAGNESIUM HYDROXIDE/ALUMINUM HYDROXICE/SIMETHICONE 120; 1200; 1200 MG/30ML; MG/30ML; MG/30ML
30 SUSPENSION ORAL EVERY 6 HOURS PRN
Status: DISCONTINUED | OUTPATIENT
Start: 2018-11-18 | End: 2018-11-19 | Stop reason: HOSPADM

## 2018-11-18 RX ADMIN — MIDODRINE HYDROCHLORIDE 5 MG: 5 TABLET ORAL at 17:24

## 2018-11-18 RX ADMIN — ATORVASTATIN CALCIUM 80 MG: 40 TABLET, FILM COATED ORAL at 21:08

## 2018-11-18 RX ADMIN — MIDODRINE HYDROCHLORIDE 5 MG: 5 TABLET ORAL at 12:07

## 2018-11-18 RX ADMIN — MIDODRINE HYDROCHLORIDE 5 MG: 5 TABLET ORAL at 09:34

## 2018-11-18 RX ADMIN — SODIUM CHLORIDE, PRESERVATIVE FREE 10 ML: 5 INJECTION INTRAVENOUS at 09:35

## 2018-11-18 RX ADMIN — SODIUM CHLORIDE, PRESERVATIVE FREE 10 ML: 5 INJECTION INTRAVENOUS at 21:11

## 2018-11-18 RX ADMIN — ALUMINUM HYDROXIDE, MAGNESIUM HYDROXIDE, AND SIMETHICONE 30 ML: 200; 200; 20 SUSPENSION ORAL at 17:24

## 2018-11-18 RX ADMIN — APIXABAN 2.5 MG: 2.5 TABLET, FILM COATED ORAL at 09:34

## 2018-11-18 RX ADMIN — CARVEDILOL 12.5 MG: 12.5 TABLET, FILM COATED ORAL at 17:24

## 2018-11-18 RX ADMIN — APIXABAN 2.5 MG: 2.5 TABLET, FILM COATED ORAL at 21:08

## 2018-11-18 RX ADMIN — SACUBITRIL AND VALSARTAN 1 TABLET: 49; 51 TABLET, FILM COATED ORAL at 21:07

## 2018-11-18 RX ADMIN — FUROSEMIDE 40 MG: 40 TABLET ORAL at 09:34

## 2018-11-18 RX ADMIN — FUROSEMIDE 40 MG: 40 TABLET ORAL at 17:24

## 2018-11-18 RX ADMIN — SACUBITRIL AND VALSARTAN 1 TABLET: 49; 51 TABLET, FILM COATED ORAL at 09:47

## 2018-11-18 RX ADMIN — CARVEDILOL 12.5 MG: 12.5 TABLET, FILM COATED ORAL at 09:34

## 2018-11-18 RX ADMIN — ASPIRIN 81 MG: 81 TABLET, COATED ORAL at 09:34

## 2018-11-18 ASSESSMENT — PAIN SCALES - GENERAL: PAINLEVEL_OUTOF10: 0

## 2018-11-18 NOTE — PROGRESS NOTES
Today's plan: continue entresto, continue lasix, coreg and eliquis, blood pressure remains stable with entresto      Admit Date:  11/11/2018    Subjective: stable      Chief complaints on admission  Chief Complaint   Patient presents with    Chest Pain     left sided CP since last night with increasing SOB          History of present illness:Thad is a 80 y. o.year old who  presents with had concerns including Chest Pain (left sided CP since last night with increasing SOB ). Past medical history:    has a past medical history of Additional heart attack (anterior wall); CAD (coronary artery disease); CHF (congestive heart failure) ([address] Heart imaging; History of cardiovascular stress test; History of echocardiogram; History of left heart catheterization; Hyperlipidemia; Hypertension; ICD (implantable cardioverter-defibrillator), biventricular, in situ; PAF (paroxysmal atrial fibrillation) ([address] and Shingles. Past surgical history:   has a past surgical history that includes Total knee arthroplasty (6483-2652); Percutaneous Transluminal Coronary Angio (8/13/98); and Cardiac defibrillator placement (Left, 03/20/2017). Social History:   reports that he has quit smoking. He has never used smokeless tobacco. He reports that he does not drink alcohol or use drugs. Family history:  family history includes Cancer in his brother; Heart Attack in his brother, father, and mother.     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    Plavix [Clopidogrel]      Blood in stool         Objective:   /71   Pulse 70   Temp 97.9 °F (36.6 °C) (Oral)   Resp 24   Ht 5' 6\" (1.676 m)   Wt 168 lb 14.4 oz (76.6 kg)   SpO2 93%   BMI 27.26 kg/m²       Intake/Output Summary (Last 24 hours) at 11/18/18 1353  Last data filed at 11/18/18 1200   Gross per 24 hour   Intake              240 ml   Output                0 ml   Net

## 2018-11-18 NOTE — PROGRESS NOTES
Hospitalist Progress Note      Name:  Neno Izaguirre /Age/Sex: 1933  (80 y.o. male)   MRN & CSN:  [id number] & [id number] Admission Date/Time: 2018 10:33 AM   Location:  5688/8353- PCP: Chaya Wu MD         Hospital Day: 8    Assessment and Plan:   Neno Izaguirre is a 80 y.o.  male  who presents with progressive SOB    Acute on chronic systolic HF exacerbation  EF per last Cardiolite 12% with large infarcts  Continue Coreg and Entersto  Continue Lasix 40 mg BID  Cardiology following                Acute LBP   consider strain/sprain, recent PCP visit  continue Baclofen, [place] PRN  PT / OT to assist with exercises  Will need SNF for subacute rehab   Has a bed, family prefers if he doesn't get transferred on the weekend  Will discharge on Monday     Acute on chronic respiratory failure with hypoxia  He is on 2 L oxygen at home  He was short of breath & hypoxic on admit  He is back to his baseline O2 requirement    Bleeding from external ear canal  Ear exam showing scanty blood in external ear canal  Tympanic membrane intact  No more episodes  Needs outpatient ENT eval after discharge    CKD stage III- stable, SCr at baseline 2.2  avoid nephrotoxic agents (Naproxen for recent LBP) and renally dose all meds  NSAID use can precipitate an admission for him  - SCr is 2.1, monitor  11/15 Cr 1.8     CAD with ischemic CM s/p pacemaker and BiV ICD given HFrEF-    tele and continue ASA, Eliquis, Entresto, Coreg,      PAF  stable, on chronic AC-->Eliquis  continue and tele     HTN- stable and controled, continue Entresto, Coreg and monitor BP     HLD- stable, continue statin     Anemia of chronic disease- stable      Diet DIET LOW SODIUM 2 GM;  Dietary Nutrition Supplements: Standard High Calorie Oral Supplement  Dietary Nutrition Supplements: Standard High Calorie Oral Supplement   DVT Prophylaxis [] eliquis   GI Prophylaxis [] PPI, [] H2 Blocker, [x] No GI prophylaxis, patient is receiving

## 2018-11-19 VITALS
RESPIRATION RATE: 15 BRPM | WEIGHT: 163.8 LBS | BODY MASS INDEX: 26.33 KG/M2 | DIASTOLIC BLOOD PRESSURE: 63 MMHG | HEART RATE: 70 BPM | TEMPERATURE: 98.7 F | SYSTOLIC BLOOD PRESSURE: 97 MMHG | OXYGEN SATURATION: 99 % | HEIGHT: 66 IN

## 2018-11-19 PROCEDURE — 6370000000 HC RX 637 (ALT 250 FOR IP): Performed by: INTERNAL MEDICINE

## 2018-11-19 PROCEDURE — 97530 THERAPEUTIC ACTIVITIES: CPT

## 2018-11-19 PROCEDURE — 97110 THERAPEUTIC EXERCISES: CPT

## 2018-11-19 PROCEDURE — 2580000003 HC RX 258: Performed by: PHYSICIAN ASSISTANT

## 2018-11-19 PROCEDURE — 6370000000 HC RX 637 (ALT 250 FOR IP): Performed by: PHYSICIAN ASSISTANT

## 2018-11-19 PROCEDURE — 97116 GAIT TRAINING THERAPY: CPT

## 2018-11-19 PROCEDURE — 99232 SBSQ HOSP IP/OBS MODERATE 35: CPT | Performed by: INTERNAL MEDICINE

## 2018-11-19 RX ORDER — BACLOFEN 10 MG/1
10 TABLET ORAL 3 TIMES DAILY PRN
Qty: 10 TABLET | Refills: 0 | Status: ON HOLD | OUTPATIENT
Start: 2018-11-19 | End: 2018-12-01

## 2018-11-19 RX ORDER — HYDROCODONE BITARTRATE AND ACETAMINOPHEN 5; 325 MG/1; MG/1
1 TABLET ORAL EVERY 6 HOURS PRN
Qty: 28 TABLET | Refills: 0 | Status: SHIPPED | OUTPATIENT
Start: 2018-11-19 | End: 2018-11-26

## 2018-11-19 RX ORDER — MIDODRINE HYDROCHLORIDE 5 MG/1
5 TABLET ORAL
Qty: 90 TABLET | Refills: 3 | Status: ON HOLD | OUTPATIENT
Start: 2018-11-19 | End: 2018-12-02

## 2018-11-19 RX ADMIN — APIXABAN 2.5 MG: 2.5 TABLET, FILM COATED ORAL at 10:06

## 2018-11-19 RX ADMIN — SACUBITRIL AND VALSARTAN 1 TABLET: 49; 51 TABLET, FILM COATED ORAL at 10:19

## 2018-11-19 RX ADMIN — ASPIRIN 81 MG: 81 TABLET, COATED ORAL at 10:06

## 2018-11-19 RX ADMIN — CARVEDILOL 12.5 MG: 12.5 TABLET, FILM COATED ORAL at 10:06

## 2018-11-19 RX ADMIN — SODIUM CHLORIDE, PRESERVATIVE FREE 10 ML: 5 INJECTION INTRAVENOUS at 10:13

## 2018-11-19 RX ADMIN — FUROSEMIDE 40 MG: 40 TABLET ORAL at 10:07

## 2018-11-19 RX ADMIN — MIDODRINE HYDROCHLORIDE 5 MG: 5 TABLET ORAL at 10:06

## 2018-11-19 RX ADMIN — MIDODRINE HYDROCHLORIDE 5 MG: 5 TABLET ORAL at 13:16

## 2018-11-19 NOTE — PROGRESS NOTES
HYDROcodone-acetaminophen, nitroGLYCERIN, sodium chloride flush, magnesium hydroxide, ondansetron    Lab Data:  CBC: No results for input(s): WBC, HGB, HCT, MCV, PLT in the last 72 hours. BMP:   No results for input(s): NA, K, CL, CO2, PHOS, BUN, CREATININE in the last 72 hours. Invalid input(s): CA  LIVER PROFILE: No results for input(s): AST, ALT, LIPASE, BILIDIR, BILITOT, ALKPHOS in the last 72 hours. Invalid input(s): AMYLASE,  ALB  PT/INR: No results for input(s): PROTIME, INR in the last 72 hours. APTT: No results for input(s): APTT in the last 72 hours. BNP:  No results for input(s): BNP in the last 72 hours. TROPONIN: @TROPONINI:3@      Assessment:  80 y. o.year old who is admitted for          Plan:  As above  CAD: stable, continue aspirin, statins  Inguinal hernia: rt side, will recommend to get sx consult  All labs, medications and tests reviewed, continue all other medications of all above medical condition listed as is.       Cb Mendoza MD 11/19/2018 11:04 AM

## 2018-11-19 NOTE — PROGRESS NOTES
Physical Therapy  . Physical Therapy Treatment Note  Name: Ever Brunson MRN: [id number] :   1933   Date:  2018   Admission Date: 2018 Room:  [address] [place] [address]-A     Restrictions/Precautions:  Restrictions/Precautions  Restrictions/Precautions: General Precautions, Fall Risk         Communication with other providers:  ARLENE Samuels cleared patient for physical therapy. Subjective:  Patient states:  Agreeable to therapy. Pain:   Location, Type, Intensity (0/10 to 10/10): Denies pain. Objective:    Observation:  Patient seated at EOB upon therapist arrival. Son present. Tele. A&O x4  Vitals: HR 71, RR 18, BP 97/63    Treatment, including education/measures:  Transfers  Supine to sit :Patient received seated at EOB. Sit to supine :NT  Scooting :NT  Rolling :NT  Sit to stand :SBA  Stand to sit :SBA      Therapeutic Exercise:  Therapeutic exercises were instructed today. Instructions were given for technique, safety, recruitment, and rationale. Instructions were verbal and/or tactile. Seated: Ankle pumps 1 sets of 10 reps  Long arc quads 1 sets of 10 reps  Marching 1 sets of 10 reps  Hip abduction / adduction 1 sets of 10 reps  Arm raises 1 set of 10 reps  Arm crosses 1 set of 10 reps  Side arm raises 1 set of 10 reps  Arm circles 1 set of 10 reps each (forward and backward)     Gait:  Patient ambulated with RW for 75 ft and 100 fet with CGA. Patient taking a prolonged seated rest break during gait trials. Patient presents with decreased bilateral step height and forward flexed posture. Patient required verbal instructions for postural corrections. Safety  Patient left safely in the chair, with call light/phone in reach with alarm applied. Gait belt was used for transfers and gait.       Assessment / Impression:    Patient's tolerance of treatment:  well   Adverse Reaction: none  Significant change in status and impact:  none  Barriers to improvement:  none  Discharge plan:

## 2018-11-20 LAB
EKG ATRIAL RATE: 73 BPM
EKG DIAGNOSIS: NORMAL
EKG P-R INTERVAL: 178 MS
EKG Q-T INTERVAL: 474 MS
EKG QRS DURATION: 192 MS
EKG QTC CALCULATION (BAZETT): 522 MS
EKG R AXIS: 226 DEGREES
EKG T AXIS: 27 DEGREES
EKG VENTRICULAR RATE: 73 BPM

## 2018-11-30 ENCOUNTER — TELEPHONE (OUTPATIENT)
Dept: FAMILY MEDICINE CLINIC | Age: 83
End: 2018-11-30

## 2018-12-01 ENCOUNTER — APPOINTMENT (OUTPATIENT)
Dept: GENERAL RADIOLOGY | Age: 83
End: 2018-12-01
Payer: COMMERCIAL

## 2018-12-01 ENCOUNTER — HOSPITAL ENCOUNTER (OUTPATIENT)
Age: 83
Setting detail: OBSERVATION
Discharge: HOSPICE | End: 2018-12-01
Attending: EMERGENCY MEDICINE | Admitting: HOSPITALIST
Payer: COMMERCIAL

## 2018-12-01 ENCOUNTER — HOSPITAL ENCOUNTER (INPATIENT)
Age: 83
LOS: 1 days | Discharge: HOSPICE | DRG: 291 | End: 2018-12-02
Attending: FAMILY MEDICINE | Admitting: FAMILY MEDICINE
Payer: COMMERCIAL

## 2018-12-01 VITALS
HEIGHT: 66 IN | TEMPERATURE: 97.9 F | BODY MASS INDEX: 26.03 KG/M2 | WEIGHT: 162 LBS | OXYGEN SATURATION: 100 % | DIASTOLIC BLOOD PRESSURE: 83 MMHG | SYSTOLIC BLOOD PRESSURE: 117 MMHG | HEART RATE: 70 BPM | RESPIRATION RATE: 22 BRPM

## 2018-12-01 DIAGNOSIS — R06.02 SHORTNESS OF BREATH: Primary | ICD-10-CM

## 2018-12-01 DIAGNOSIS — I50.9 ACUTE ON CHRONIC CONGESTIVE HEART FAILURE, UNSPECIFIED HEART FAILURE TYPE (HCC): ICD-10-CM

## 2018-12-01 PROBLEM — I50.1 PULMONARY EDEMA CARDIAC CAUSE (HCC): Status: ACTIVE | Noted: 2018-12-01

## 2018-12-01 LAB
ALBUMIN SERPL-MCNC: 3.3 GM/DL (ref 3.4–5)
ALP BLD-CCNC: 99 IU/L (ref 40–129)
ALT SERPL-CCNC: 19 U/L (ref 10–40)
ANION GAP SERPL CALCULATED.3IONS-SCNC: 13 MMOL/L (ref 4–16)
AST SERPL-CCNC: 16 IU/L (ref 15–37)
BASOPHILS ABSOLUTE: 0.1 K/CU MM
BASOPHILS RELATIVE PERCENT: 1 % (ref 0–1)
BILIRUB SERPL-MCNC: 1.6 MG/DL (ref 0–1)
BUN BLDV-MCNC: 30 MG/DL (ref 6–23)
CALCIUM SERPL-MCNC: 9.3 MG/DL (ref 8.3–10.6)
CHLORIDE BLD-SCNC: 102 MMOL/L (ref 99–110)
CO2: 26 MMOL/L (ref 21–32)
CREAT SERPL-MCNC: 1.9 MG/DL (ref 0.9–1.3)
DIFFERENTIAL TYPE: ABNORMAL
EKG ATRIAL RATE: 71 BPM
EKG DIAGNOSIS: NORMAL
EKG Q-T INTERVAL: 350 MS
EKG QRS DURATION: 22 MS
EKG QTC CALCULATION (BAZETT): 543 MS
EKG R AXIS: 31 DEGREES
EKG T AXIS: 64 DEGREES
EKG VENTRICULAR RATE: 145 BPM
EOSINOPHILS ABSOLUTE: 0.1 K/CU MM
EOSINOPHILS RELATIVE PERCENT: 1.3 % (ref 0–3)
GFR AFRICAN AMERICAN: 41 ML/MIN/1.73M2
GFR NON-AFRICAN AMERICAN: 34 ML/MIN/1.73M2
GLUCOSE BLD-MCNC: 117 MG/DL (ref 70–99)
HCT VFR BLD CALC: 42 % (ref 42–52)
HEMOGLOBIN: 11.6 GM/DL (ref 13.5–18)
IMMATURE NEUTROPHIL %: 0.6 % (ref 0–0.43)
LYMPHOCYTES ABSOLUTE: 0.8 K/CU MM
LYMPHOCYTES RELATIVE PERCENT: 8.4 % (ref 24–44)
MCH RBC QN AUTO: 25.8 PG (ref 27–31)
MCHC RBC AUTO-ENTMCNC: 27.6 % (ref 32–36)
MCV RBC AUTO: 93.5 FL (ref 78–100)
MONOCYTES ABSOLUTE: 0.8 K/CU MM
MONOCYTES RELATIVE PERCENT: 8.3 % (ref 0–4)
NUCLEATED RBC %: 0 %
PDW BLD-RTO: 19.7 % (ref 11.7–14.9)
PLATELET # BLD: 186 K/CU MM (ref 140–440)
PMV BLD AUTO: 10 FL (ref 7.5–11.1)
POTASSIUM SERPL-SCNC: 3.9 MMOL/L (ref 3.5–5.1)
PRO-BNP: ABNORMAL PG/ML
RBC # BLD: 4.49 M/CU MM (ref 4.6–6.2)
SEGMENTED NEUTROPHILS ABSOLUTE COUNT: 7.3 K/CU MM
SEGMENTED NEUTROPHILS RELATIVE PERCENT: 80.4 % (ref 36–66)
SODIUM BLD-SCNC: 141 MMOL/L (ref 135–145)
TOTAL IMMATURE NEUTOROPHIL: 0.05 K/CU MM
TOTAL NUCLEATED RBC: 0 K/CU MM
TOTAL PROTEIN: 7.4 GM/DL (ref 6.4–8.2)
TROPONIN T: 0.01 NG/ML
TSH HIGH SENSITIVITY: 1.51 UIU/ML (ref 0.27–4.2)
WBC # BLD: 9.1 K/CU MM (ref 4–10.5)

## 2018-12-01 PROCEDURE — 96374 THER/PROPH/DIAG INJ IV PUSH: CPT

## 2018-12-01 PROCEDURE — G0378 HOSPITAL OBSERVATION PER HR: HCPCS

## 2018-12-01 PROCEDURE — 2580000003 HC RX 258: Performed by: NURSE PRACTITIONER

## 2018-12-01 PROCEDURE — 93005 ELECTROCARDIOGRAM TRACING: CPT | Performed by: EMERGENCY MEDICINE

## 2018-12-01 PROCEDURE — 36415 COLL VENOUS BLD VENIPUNCTURE: CPT

## 2018-12-01 PROCEDURE — 2700000000 HC OXYGEN THERAPY PER DAY

## 2018-12-01 PROCEDURE — 6370000000 HC RX 637 (ALT 250 FOR IP): Performed by: NURSE PRACTITIONER

## 2018-12-01 PROCEDURE — 80053 COMPREHEN METABOLIC PANEL: CPT

## 2018-12-01 PROCEDURE — 94761 N-INVAS EAR/PLS OXIMETRY MLT: CPT

## 2018-12-01 PROCEDURE — 1250000000 HC SEMI PRIVATE HOSPICE R&B

## 2018-12-01 PROCEDURE — 71045 X-RAY EXAM CHEST 1 VIEW: CPT

## 2018-12-01 PROCEDURE — 85025 COMPLETE CBC W/AUTO DIFF WBC: CPT

## 2018-12-01 PROCEDURE — 84484 ASSAY OF TROPONIN QUANT: CPT

## 2018-12-01 PROCEDURE — 84443 ASSAY THYROID STIM HORMONE: CPT

## 2018-12-01 PROCEDURE — 83880 ASSAY OF NATRIURETIC PEPTIDE: CPT

## 2018-12-01 PROCEDURE — 6360000002 HC RX W HCPCS: Performed by: NURSE PRACTITIONER

## 2018-12-01 PROCEDURE — 2140000000 HC CCU INTERMEDIATE R&B

## 2018-12-01 PROCEDURE — 99285 EMERGENCY DEPT VISIT HI MDM: CPT

## 2018-12-01 PROCEDURE — 93010 ELECTROCARDIOGRAM REPORT: CPT | Performed by: INTERNAL MEDICINE

## 2018-12-01 RX ORDER — CARVEDILOL 12.5 MG/1
12.5 TABLET ORAL 2 TIMES DAILY WITH MEALS
Status: DISCONTINUED | OUTPATIENT
Start: 2018-12-02 | End: 2018-12-02 | Stop reason: HOSPADM

## 2018-12-01 RX ORDER — SODIUM CHLORIDE 0.9 % (FLUSH) 0.9 %
10 SYRINGE (ML) INJECTION PRN
Status: DISCONTINUED | OUTPATIENT
Start: 2018-12-01 | End: 2018-12-02 | Stop reason: HOSPADM

## 2018-12-01 RX ORDER — SODIUM CHLORIDE 0.9 % (FLUSH) 0.9 %
10 SYRINGE (ML) INJECTION EVERY 12 HOURS SCHEDULED
Status: DISCONTINUED | OUTPATIENT
Start: 2018-12-01 | End: 2018-12-02 | Stop reason: HOSPADM

## 2018-12-01 RX ORDER — SODIUM CHLORIDE 0.9 % (FLUSH) 0.9 %
10 SYRINGE (ML) INJECTION EVERY 12 HOURS SCHEDULED
Status: DISCONTINUED | OUTPATIENT
Start: 2018-12-01 | End: 2018-12-01 | Stop reason: HOSPADM

## 2018-12-01 RX ORDER — LANOLIN ALCOHOL/MO/W.PET/CERES
6 CREAM (GRAM) TOPICAL NIGHTLY PRN
Status: DISCONTINUED | OUTPATIENT
Start: 2018-12-01 | End: 2018-12-02 | Stop reason: HOSPADM

## 2018-12-01 RX ORDER — LANOLIN ALCOHOL/MO/W.PET/CERES
6 CREAM (GRAM) TOPICAL NIGHTLY PRN
Status: DISCONTINUED | OUTPATIENT
Start: 2018-12-01 | End: 2018-12-01 | Stop reason: HOSPADM

## 2018-12-01 RX ORDER — ONDANSETRON 2 MG/ML
4 INJECTION INTRAMUSCULAR; INTRAVENOUS EVERY 6 HOURS PRN
Status: DISCONTINUED | OUTPATIENT
Start: 2018-12-01 | End: 2018-12-02

## 2018-12-01 RX ORDER — LANOLIN ALCOHOL/MO/W.PET/CERES
6 CREAM (GRAM) TOPICAL NIGHTLY PRN
Status: CANCELLED | OUTPATIENT
Start: 2018-12-01

## 2018-12-01 RX ORDER — SODIUM CHLORIDE 0.9 % (FLUSH) 0.9 %
10 SYRINGE (ML) INJECTION EVERY 12 HOURS SCHEDULED
Status: CANCELLED | OUTPATIENT
Start: 2018-12-01

## 2018-12-01 RX ORDER — MIDODRINE HYDROCHLORIDE 5 MG/1
5 TABLET ORAL
Status: DISCONTINUED | OUTPATIENT
Start: 2018-12-02 | End: 2018-12-02 | Stop reason: HOSPADM

## 2018-12-01 RX ORDER — ATORVASTATIN CALCIUM 40 MG/1
80 TABLET, FILM COATED ORAL NIGHTLY
Status: DISCONTINUED | OUTPATIENT
Start: 2018-12-01 | End: 2018-12-01 | Stop reason: HOSPADM

## 2018-12-01 RX ORDER — FUROSEMIDE 10 MG/ML
40 INJECTION INTRAMUSCULAR; INTRAVENOUS 2 TIMES DAILY
Status: DISCONTINUED | OUTPATIENT
Start: 2018-12-01 | End: 2018-12-01 | Stop reason: HOSPADM

## 2018-12-01 RX ORDER — ATORVASTATIN CALCIUM 40 MG/1
80 TABLET, FILM COATED ORAL NIGHTLY
Status: CANCELLED | OUTPATIENT
Start: 2018-12-01

## 2018-12-01 RX ORDER — SODIUM CHLORIDE 0.9 % (FLUSH) 0.9 %
10 SYRINGE (ML) INJECTION PRN
Status: CANCELLED | OUTPATIENT
Start: 2018-12-01

## 2018-12-01 RX ORDER — ONDANSETRON 2 MG/ML
4 INJECTION INTRAMUSCULAR; INTRAVENOUS EVERY 6 HOURS PRN
Status: DISCONTINUED | OUTPATIENT
Start: 2018-12-01 | End: 2018-12-01 | Stop reason: HOSPADM

## 2018-12-01 RX ORDER — FUROSEMIDE 10 MG/ML
40 INJECTION INTRAMUSCULAR; INTRAVENOUS 2 TIMES DAILY
Status: DISCONTINUED | OUTPATIENT
Start: 2018-12-01 | End: 2018-12-02 | Stop reason: HOSPADM

## 2018-12-01 RX ORDER — MIDODRINE HYDROCHLORIDE 5 MG/1
5 TABLET ORAL
Status: DISCONTINUED | OUTPATIENT
Start: 2018-12-01 | End: 2018-12-01 | Stop reason: HOSPADM

## 2018-12-01 RX ORDER — SODIUM CHLORIDE 0.9 % (FLUSH) 0.9 %
10 SYRINGE (ML) INJECTION PRN
Status: DISCONTINUED | OUTPATIENT
Start: 2018-12-01 | End: 2018-12-01 | Stop reason: HOSPADM

## 2018-12-01 RX ORDER — ONDANSETRON 2 MG/ML
4 INJECTION INTRAMUSCULAR; INTRAVENOUS EVERY 6 HOURS PRN
Status: CANCELLED | OUTPATIENT
Start: 2018-12-01

## 2018-12-01 RX ORDER — CARVEDILOL 12.5 MG/1
12.5 TABLET ORAL 2 TIMES DAILY WITH MEALS
Status: DISCONTINUED | OUTPATIENT
Start: 2018-12-01 | End: 2018-12-01 | Stop reason: HOSPADM

## 2018-12-01 RX ORDER — FUROSEMIDE 10 MG/ML
40 INJECTION INTRAMUSCULAR; INTRAVENOUS 2 TIMES DAILY
Status: CANCELLED | OUTPATIENT
Start: 2018-12-01

## 2018-12-01 RX ORDER — ATORVASTATIN CALCIUM 40 MG/1
80 TABLET, FILM COATED ORAL NIGHTLY
Status: DISCONTINUED | OUTPATIENT
Start: 2018-12-01 | End: 2018-12-02 | Stop reason: HOSPADM

## 2018-12-01 RX ORDER — CARVEDILOL 12.5 MG/1
12.5 TABLET ORAL 2 TIMES DAILY WITH MEALS
Status: CANCELLED | OUTPATIENT
Start: 2018-12-02

## 2018-12-01 RX ORDER — MIDODRINE HYDROCHLORIDE 5 MG/1
5 TABLET ORAL
Status: CANCELLED | OUTPATIENT
Start: 2018-12-02

## 2018-12-01 RX ADMIN — SODIUM CHLORIDE, PRESERVATIVE FREE 10 ML: 5 INJECTION INTRAVENOUS at 22:26

## 2018-12-01 RX ADMIN — APIXABAN 2.5 MG: 2.5 TABLET, FILM COATED ORAL at 22:26

## 2018-12-01 RX ADMIN — CARVEDILOL 12.5 MG: 12.5 TABLET, FILM COATED ORAL at 17:35

## 2018-12-01 RX ADMIN — MELATONIN TAB 3 MG 6 MG: 3 TAB at 22:26

## 2018-12-01 RX ADMIN — FUROSEMIDE 40 MG: 10 INJECTION, SOLUTION INTRAMUSCULAR; INTRAVENOUS at 14:39

## 2018-12-01 RX ADMIN — MIDODRINE HYDROCHLORIDE 5 MG: 5 TABLET ORAL at 17:35

## 2018-12-01 RX ADMIN — ATORVASTATIN CALCIUM 80 MG: 40 TABLET, FILM COATED ORAL at 22:26

## 2018-12-01 RX ADMIN — MIDODRINE HYDROCHLORIDE 5 MG: 5 TABLET ORAL at 14:39

## 2018-12-01 ASSESSMENT — PAIN DESCRIPTION - FREQUENCY: FREQUENCY: CONTINUOUS

## 2018-12-01 ASSESSMENT — PAIN DESCRIPTION - PAIN TYPE: TYPE: ACUTE PAIN

## 2018-12-01 ASSESSMENT — PAIN SCALES - GENERAL
PAINLEVEL_OUTOF10: 6
PAINLEVEL_OUTOF10: 0

## 2018-12-01 ASSESSMENT — PAIN DESCRIPTION - DESCRIPTORS: DESCRIPTORS: PRESSURE

## 2018-12-01 ASSESSMENT — PAIN DESCRIPTION - ONSET: ONSET: PROGRESSIVE

## 2018-12-01 ASSESSMENT — PAIN DESCRIPTION - LOCATION: LOCATION: CHEST

## 2018-12-01 NOTE — ED PROVIDER NOTES
EMERGENCY DEPARTMENT H&P    Patient Name:  Delmi Huerta  :  1933  MRN:  [id number]  Date of Visit:  2018    Triage Chief Complaint:   Chest Pain and Shortness of Breath    HPI:  Delmi Huerta is a 80 y.o. male who presents for c/o midsternal constant chest pressure with associated FLAKITA and mild nausea that started last night and symptoms have been ongoing since that time. He reports the chest pressure feels like a heaviness, does not radiate, is rated 6/10 at this time. His son is present and states pt has a h/o severe CHF and has been admitted several times for this recently. Pt states he feels \"full of water\" again. He states he has taken 120 mg lasix this morning to try to urinate the water out but he has only urinated a very small amount and the lasix is not helping. He has also had a mild cough. Denies increased LE edema. FLAKITA is worse with laying flat. Denies alleviating factors. Denies any other associated symptoms at this time. ROS:  Review of Systems  Ten point ROS was performed and is negative except as stated in HPI above. Past Medical History:   Diagnosis Date    Additional heart attack (anterior wall)     CAD (coronary artery disease)     CH[facility]congestive heart failure) ([facility]     Heart imaging 14    MUGA: Abnormal study, EF 39% global hypokinesis of LV.  History of cardiovascular stress test 2014 EF 34% suggest prior ant and apical MI, LV function severely reduced 2003EF=36%,perf. diminished to anteroapical segment w/minimal redistribution: sugg. of prior MI.    History of echocardiogram 14EF50-55% Mild TR, right ventricular systolic pressure is elevated at 30-40mm Hg.  History of left heart catheterization 2016    LAD 50% in stent re-stenosis, CX/RCA/LM no significant diaease.      Hyperlipidemia     Hypertension     ICD (implantable cardioverter-defibrillator), biventricular, in situ 2017    PAF (paroxysmal K/CU MM    Nucleated RBC % 0.0 %    Total Nucleated RBC 0.0 K/CU MM    Total Immature Neutrophil 0.05 K/CU MM    Immature Neutrophil % 0.6 (H) 0 - 0.43 %   Brain Natriuretic Peptide   Result Value Ref Range    Pro-BNP 24,215 (H) <300 PG/ML   Comprehensive Metabolic Panel w/ Reflex to MG   Result Value Ref Range    Sodium 141 135 - 145 MMOL/L    Potassium 3.9 3.5 - 5.1 MMOL/L    Chloride 102 99 - 110 mMol/L    CO2 26 21 - 32 MMOL/L    BUN 30 (H) 6 - 23 MG/DL    CREATININE 1.9 (H) 0.9 - 1.3 MG/DL    Glucose 117 (H) 70 - 99 MG/DL    Calcium 9.3 8.3 - 10.6 MG/DL    Alb 3.3 (L) 3.4 - 5.0 GM/DL    Total Protein 7.4 6.4 - 8.2 GM/DL    Total Bilirubin 1.6 (H) 0.0 - 1.0 MG/DL    ALT 19 10 - 40 U/L    AST 16 15 - 37 IU/L    Alkaline Phosphatase 99 40 - 129 IU/L    GFR Non- 34 (L) >60 mL/min/1.73m2    GFR  41 (L) >60 mL/min/1.73m2    Anion Gap 13 4 - 16   Troponin   Result Value Ref Range    Troponin T 0.013 (H) <0.01 NG/ML       Radiographs:  Radiologist's Report Reviewed:  XR CHEST PORTABLE   Final Result   1. Cardiomegaly with increased pulmonary edema and small bilateral pleural   effusions with associated bibasilar atelectasis. EKG: (All EKG's are interpreted by myself in the absence of a cardiologist)    Rhythm: AV dual paced rhythm  Rate: 70  Ectopy: None  ST Segments: No acute elevations or depressions noted. T Waves: No acute findings. ED COURSE & MDM:  Nursing notes and vital signs were reviewed. The patient's medical record and available pertinent information was also reviewed if available. Pt presents with stable VS. He appears mild tachypneic but no obvious resp distress noted. Bibasilar rales appreciated. Suspect exacerbation of CHF. Workup initiated as above and remarkable for worsening pulmonary edema and B/L pleural effusions, mildly elevated troponin, elevated BNP, CKD. Did not feel that pt required immediate Bipap or nitro GTT therapy.  Pt has already

## 2018-12-02 VITALS
RESPIRATION RATE: 20 BRPM | HEIGHT: 66 IN | BODY MASS INDEX: 25.81 KG/M2 | TEMPERATURE: 98.1 F | WEIGHT: 160.6 LBS | HEART RATE: 76 BPM | SYSTOLIC BLOOD PRESSURE: 102 MMHG | DIASTOLIC BLOOD PRESSURE: 69 MMHG | OXYGEN SATURATION: 94 %

## 2018-12-02 PROBLEM — I34.0 NON-RHEUMATIC MITRAL REGURGITATION: Status: ACTIVE | Noted: 2018-12-02

## 2018-12-02 LAB
ANION GAP SERPL CALCULATED.3IONS-SCNC: 12 MMOL/L (ref 4–16)
BUN BLDV-MCNC: 34 MG/DL (ref 6–23)
CALCIUM SERPL-MCNC: 8.8 MG/DL (ref 8.3–10.6)
CHLORIDE BLD-SCNC: 100 MMOL/L (ref 99–110)
CO2: 27 MMOL/L (ref 21–32)
CREAT SERPL-MCNC: 1.7 MG/DL (ref 0.9–1.3)
EKG ATRIAL RATE: 70 BPM
EKG DIAGNOSIS: NORMAL
EKG P AXIS: 42 DEGREES
EKG P-R INTERVAL: 178 MS
EKG Q-T INTERVAL: 498 MS
EKG QRS DURATION: 204 MS
EKG QTC CALCULATION (BAZETT): 537 MS
EKG R AXIS: 211 DEGREES
EKG T AXIS: 18 DEGREES
EKG VENTRICULAR RATE: 70 BPM
GFR AFRICAN AMERICAN: 47 ML/MIN/1.73M2
GFR NON-AFRICAN AMERICAN: 39 ML/MIN/1.73M2
GLUCOSE BLD-MCNC: 155 MG/DL (ref 70–99)
POTASSIUM SERPL-SCNC: 3.9 MMOL/L (ref 3.5–5.1)
SODIUM BLD-SCNC: 139 MMOL/L (ref 135–145)

## 2018-12-02 PROCEDURE — 6360000002 HC RX W HCPCS: Performed by: NURSE PRACTITIONER

## 2018-12-02 PROCEDURE — 2700000000 HC OXYGEN THERAPY PER DAY

## 2018-12-02 PROCEDURE — 36415 COLL VENOUS BLD VENIPUNCTURE: CPT

## 2018-12-02 PROCEDURE — 80048 BASIC METABOLIC PNL TOTAL CA: CPT

## 2018-12-02 PROCEDURE — 6370000000 HC RX 637 (ALT 250 FOR IP): Performed by: NURSE PRACTITIONER

## 2018-12-02 PROCEDURE — 94761 N-INVAS EAR/PLS OXIMETRY MLT: CPT

## 2018-12-02 PROCEDURE — 2580000003 HC RX 258: Performed by: NURSE PRACTITIONER

## 2018-12-02 RX ORDER — FUROSEMIDE 40 MG/1
80 TABLET ORAL DAILY PRN
Qty: 60 TABLET | Refills: 3
Start: 2018-12-02 | End: 2019-03-07 | Stop reason: ALTCHOICE

## 2018-12-02 RX ORDER — MIDODRINE HYDROCHLORIDE 5 MG/1
2.5 TABLET ORAL
Qty: 90 TABLET | Refills: 3 | Status: SHIPPED | OUTPATIENT
Start: 2018-12-02 | End: 2019-03-07

## 2018-12-02 RX ORDER — METOLAZONE 5 MG/1
2.5 TABLET ORAL DAILY PRN
Qty: 30 TABLET | Refills: 0
Start: 2018-12-02 | End: 2021-01-01

## 2018-12-02 RX ORDER — FUROSEMIDE 40 MG/1
60 TABLET ORAL 2 TIMES DAILY
Qty: 30 TABLET | Refills: 0 | Status: SHIPPED | OUTPATIENT
Start: 2018-12-02 | End: 2019-08-09 | Stop reason: SDUPTHER

## 2018-12-02 RX ADMIN — FUROSEMIDE 40 MG: 10 INJECTION, SOLUTION INTRAMUSCULAR; INTRAVENOUS at 09:30

## 2018-12-02 RX ADMIN — APIXABAN 2.5 MG: 2.5 TABLET, FILM COATED ORAL at 09:30

## 2018-12-02 RX ADMIN — CARVEDILOL 12.5 MG: 12.5 TABLET, FILM COATED ORAL at 09:30

## 2018-12-02 RX ADMIN — SODIUM CHLORIDE, PRESERVATIVE FREE 10 ML: 5 INJECTION INTRAVENOUS at 09:31

## 2018-12-02 RX ADMIN — MIDODRINE HYDROCHLORIDE 5 MG: 5 TABLET ORAL at 13:07

## 2018-12-02 RX ADMIN — SACUBITRIL AND VALSARTAN 1 TABLET: 49; 51 TABLET, FILM COATED ORAL at 10:16

## 2018-12-02 RX ADMIN — MIDODRINE HYDROCHLORIDE 5 MG: 5 TABLET ORAL at 09:30

## 2018-12-02 NOTE — DISCHARGE SUMMARY
have fewer exacerbations than having good kidney tests. Would like to feel better for whatever time he has left. Will increase duiretics at he expense of BUN/Cr. Also explained pt's 'triple whammy' of weak heart muscle, leaky heart valve with mitral regurgitation, and CAD with frequent angina often even without exertion. Son asks about just having valve fixed- I explained that with his heart so weak and the CAD surviving and recovering from surgery would be much less likely and is probably why they didn't offer it. They would like hospice physician to follow at home, at least  for now, in light of different focus of care and for perhaps visits at home and avoiding physician waiting room where might  an illness.  -Collaborated directly with hospice RN before and after visit.   Will discharge today.             Patient Active Problem List   Diagnosis Code    CAD (coronary artery disease) I25.10    Hypertension I10    Hyperlipidemia E78.5    Ascending aortic aneurysm (HCC) I71.2    Cardiomyopathy ([facility] [address] I42.9    Shortness of breath R06.02    Dizzy spells R42    Bradycardia R00.1    Severe left ventricular systolic dysfunction D51.9    S/P PTCA (percutaneous transluminal coronary angioplasty) Z98.61    TIA (transient ischemic attack) G45.9    Aortic stenosis, mild I35.0    S/P cardiac cath Z98.890    Cerebrovascular accident (CVA) ([facility] [address] I63.9    Hypertensive urgency I16.0    Renal insufficiency N28.9    Coronary artery disease involving native coronary artery of native heart without angina pectoris I25.10    PAF (paroxysmal atrial fibrillation) ([facility]) I48.0    Biventricular automatic implantable cardioverter defibrillator in situ Z95.810    Epistaxis R04.0    Essential hypertension I10    AICD (automatic cardioverter/defibrillator) present Z95.810    PVC (premature ventricular contraction) I49.3    CHF (congestive heart failure), NYHA class I, acute on chronic, combined ([facility]) I50.43   

## 2018-12-02 NOTE — H&P
body habitus. Appears given age. EYES   -Pupils are equally round. No scleral erythema, discharge, or conjunctivitis. HENT  -MM are moist. Oral pharynx without exudates, no evidence of thrush. NECK  -Supple, no apparent thyromegaly or masses. RESP  -Crackles, mild, R base. Breathlessness with words. Symmetric chest movement while on supplemental oxygen. C/V      -S1/S2 auscultated. RRR without appreciable M/R/G. No JVD or carotid bruits. Peripheral pulses equal bilaterally and palpable. Cap refill <3 sec. No peripheral edema  GI        -Abdomen is soft non distended and without significant TTP. + BS. No masses or guarding. Rectal exam deferred.        -No CVA/ flank tenderness. Mo catheter is not present. LYMPH-No palpable cervical lymphadenopathy and no hepatosplenomegaly. No petechiae or ecchymoses. MS       -No gross joint deformities. SKIN    -Normal coloration, warm, dry. + bruises  NEURO-Cranial nerves appear grossly intact, normal speech, no lateralizing weakness. PSYC-Awake, alert, oriented x 4- person, place, time, situation,  Appropriate affect.       Data: reviewed     Assesment/Plan:     Acute on chronic combined systolic/ diastolic heart failure and valvular heart disease with pulm HTN Last TTE EF 20%. Grade 2 diastolic dysfunction, moderate MR. Hx of CAD/severe ischemic cardiomyopathy. Elevated troponin (0.013). BNP (19514). CXR:\"Cardiomegaly with increased pulmonary edema and small bilateral pleural effusions \" This is his 4th admission in 3 months. Increase home monitoring with hospice with cardiocom. Increase home lasix dose from 40mg bid to 60mg bid   Make zaroxolyn and extra lasix available at home for early intervention.- 2.5 zarox followed by 80mg lasix prn   Recheck K level today. - is fine.                             ASA/Entresto/Lipitor/coreg              IV lasix while here- pt will check weight for current baseline as soon as home.                   accident (CVA) ([facility] [address] I63.9    Hypertensive urgency I16.0    Renal insufficiency N28.9    Coronary artery disease involving native coronary artery of native heart without angina pectoris I25.10    PAF (paroxysmal atrial fibrillation) ([facility]) I48.0    Biventricular automatic implantable cardioverter defibrillator in situ Z95.810    Epistaxis R04.0    Essential hypertension I10    AICD (automatic cardioverter/defibrillator) present Z95.810    PVC (premature ventricular contraction) I49.3    CHF (congestive heart failure), NYHA class I, acute on chronic, combined ([facility]) I50.43    Acute on chronic congestive heart failure ([facility]) I50.9    CKD (chronic kidney disease) stage 3, GFR 30-59 ml/min ([facility]) A91.6    Acute systolic heart failure ([facility]) I50.21    Acute on chronic systolic heart failure (HCC) I50.23    Elevated brain natriuretic peptide (BNP) level R79.89    Acute on chronic systolic CHF (congestive heart failure) ([facility]) I50.23    Elevated serum creatinine R79.89    Unstable angina ([facility]) I20.0    Acute chest pain R07.9    Dyspnea R06.00    Chronic systolic congestive heart failure ([facility]) I50.22    Pulmonary edema cardiac cause ([facility] [address] I50.1        Electronically signed by Jayashree Holley MD on 12/2/2018 at 12:18 PM

## 2018-12-04 LAB
EKG ATRIAL RATE: 70 BPM
EKG DIAGNOSIS: NORMAL
EKG P AXIS: 60 DEGREES
EKG P-R INTERVAL: 180 MS
EKG Q-T INTERVAL: 510 MS
EKG QRS DURATION: 200 MS
EKG QTC CALCULATION (BAZETT): 550 MS
EKG R AXIS: 245 DEGREES
EKG T AXIS: 41 DEGREES
EKG VENTRICULAR RATE: 70 BPM

## 2019-01-10 ENCOUNTER — TELEPHONE (OUTPATIENT)
Dept: CARDIOLOGY CLINIC | Age: 84
End: 2019-01-10

## 2019-02-04 ENCOUNTER — OFFICE VISIT (OUTPATIENT)
Dept: FAMILY MEDICINE CLINIC | Age: 84
End: 2019-02-04
Payer: COMMERCIAL

## 2019-02-04 ENCOUNTER — TELEPHONE (OUTPATIENT)
Dept: BARIATRICS/WEIGHT MGMT | Age: 84
End: 2019-02-04

## 2019-02-04 VITALS
DIASTOLIC BLOOD PRESSURE: 64 MMHG | TEMPERATURE: 95.4 F | BODY MASS INDEX: 27.34 KG/M2 | OXYGEN SATURATION: 94 % | WEIGHT: 169.4 LBS | SYSTOLIC BLOOD PRESSURE: 110 MMHG | HEART RATE: 106 BPM

## 2019-02-04 DIAGNOSIS — M25.552 PAIN OF LEFT HIP JOINT: ICD-10-CM

## 2019-02-04 DIAGNOSIS — K40.91 UNILATERAL RECURRENT INGUINAL HERNIA WITHOUT OBSTRUCTION OR GANGRENE: Primary | ICD-10-CM

## 2019-02-04 PROCEDURE — 99213 OFFICE O/P EST LOW 20 MIN: CPT | Performed by: FAMILY MEDICINE

## 2019-02-04 RX ORDER — TRAMADOL HYDROCHLORIDE 50 MG/1
50 TABLET ORAL EVERY 6 HOURS PRN
Qty: 120 TABLET | Refills: 5 | Status: SHIPPED | OUTPATIENT
Start: 2019-02-04 | End: 2019-09-10 | Stop reason: SDUPTHER

## 2019-02-04 ASSESSMENT — PATIENT HEALTH QUESTIONNAIRE - PHQ9
SUM OF ALL RESPONSES TO PHQ9 QUESTIONS 1 & 2: 0
1. LITTLE INTEREST OR PLEASURE IN DOING THINGS: 0
SUM OF ALL RESPONSES TO PHQ QUESTIONS 1-9: 0
SUM OF ALL RESPONSES TO PHQ QUESTIONS 1-9: 0
2. FEELING DOWN, DEPRESSED OR HOPELESS: 0

## 2019-02-18 ENCOUNTER — TELEPHONE (OUTPATIENT)
Dept: BARIATRICS/WEIGHT MGMT | Age: 84
End: 2019-02-18

## 2019-02-22 ENCOUNTER — TELEPHONE (OUTPATIENT)
Dept: CARDIOLOGY CLINIC | Age: 84
End: 2019-02-22

## 2019-02-28 ENCOUNTER — TELEPHONE (OUTPATIENT)
Dept: CARDIOLOGY CLINIC | Age: 84
End: 2019-02-28

## 2019-03-07 ENCOUNTER — OFFICE VISIT (OUTPATIENT)
Dept: CARDIOLOGY CLINIC | Age: 84
End: 2019-03-07
Payer: COMMERCIAL

## 2019-03-07 VITALS
WEIGHT: 164.6 LBS | HEART RATE: 80 BPM | HEIGHT: 66 IN | DIASTOLIC BLOOD PRESSURE: 80 MMHG | BODY MASS INDEX: 26.45 KG/M2 | SYSTOLIC BLOOD PRESSURE: 126 MMHG

## 2019-03-07 DIAGNOSIS — I25.5 ISCHEMIC CARDIOMYOPATHY: Primary | ICD-10-CM

## 2019-03-07 DIAGNOSIS — Z95.810 BIVENTRICULAR AUTOMATIC IMPLANTABLE CARDIOVERTER DEFIBRILLATOR IN SITU: ICD-10-CM

## 2019-03-07 DIAGNOSIS — I25.10 CORONARY ARTERY DISEASE INVOLVING NATIVE CORONARY ARTERY OF NATIVE HEART WITHOUT ANGINA PECTORIS: ICD-10-CM

## 2019-03-07 PROCEDURE — 93284 PRGRMG EVAL IMPLANTABLE DFB: CPT | Performed by: INTERNAL MEDICINE

## 2019-03-07 PROCEDURE — 99214 OFFICE O/P EST MOD 30 MIN: CPT | Performed by: INTERNAL MEDICINE

## 2019-04-25 ENCOUNTER — TELEPHONE (OUTPATIENT)
Dept: CARDIOLOGY CLINIC | Age: 84
End: 2019-04-25

## 2019-06-20 ENCOUNTER — TELEPHONE (OUTPATIENT)
Dept: CARDIOLOGY CLINIC | Age: 84
End: 2019-06-20

## 2019-08-01 ENCOUNTER — TELEPHONE (OUTPATIENT)
Dept: CARDIOLOGY CLINIC | Age: 84
End: 2019-08-01

## 2019-08-09 RX ORDER — CARVEDILOL 6.25 MG/1
12.5 TABLET ORAL 2 TIMES DAILY WITH MEALS
Qty: 180 TABLET | Refills: 3 | Status: SHIPPED | OUTPATIENT
Start: 2019-08-09 | End: 2020-01-01

## 2019-08-09 RX ORDER — FUROSEMIDE 40 MG/1
60 TABLET ORAL 2 TIMES DAILY
Qty: 30 TABLET | Refills: 5 | Status: SHIPPED | OUTPATIENT
Start: 2019-08-09 | End: 2019-11-20 | Stop reason: SDUPTHER

## 2019-09-09 ENCOUNTER — OFFICE VISIT (OUTPATIENT)
Dept: CARDIOLOGY CLINIC | Age: 84
End: 2019-09-09
Payer: COMMERCIAL

## 2019-09-09 ENCOUNTER — TELEPHONE (OUTPATIENT)
Dept: SURGERY | Age: 84
End: 2019-09-09

## 2019-09-09 VITALS
DIASTOLIC BLOOD PRESSURE: 60 MMHG | SYSTOLIC BLOOD PRESSURE: 110 MMHG | HEIGHT: 66 IN | WEIGHT: 167 LBS | HEART RATE: 60 BPM | BODY MASS INDEX: 26.84 KG/M2

## 2019-09-09 DIAGNOSIS — I48.0 PAF (PAROXYSMAL ATRIAL FIBRILLATION) (HCC): Primary | ICD-10-CM

## 2019-09-09 DIAGNOSIS — Z95.810 BIVENTRICULAR AUTOMATIC IMPLANTABLE CARDIOVERTER DEFIBRILLATOR IN SITU: ICD-10-CM

## 2019-09-09 PROCEDURE — 93284 PRGRMG EVAL IMPLANTABLE DFB: CPT | Performed by: INTERNAL MEDICINE

## 2019-09-09 PROCEDURE — 93290 INTERROG DEV EVAL ICPMS IP: CPT | Performed by: INTERNAL MEDICINE

## 2019-09-09 PROCEDURE — 99214 OFFICE O/P EST MOD 30 MIN: CPT | Performed by: INTERNAL MEDICINE

## 2019-09-09 NOTE — PROGRESS NOTES
disease)     CHF (congestive heart failure) ([facility] [address]     Heart imaging 6/26/14    MUGA: Abnormal study, EF 39% global hypokinesis of LV.  History of cardiovascular stress test 6/4/2014 2003 6/14 EF 34% suggest prior ant and apical MI, LV function severely reduced 2003EF=36%,perf. diminished to anteroapical segment w/minimal redistribution: sugg. of prior MI.    History of echocardiogram 6/12/14 6/14EF50-55% Mild TR, right ventricular systolic pressure is elevated at 30-40mm Hg.  History of left heart catheterization 4/29/2016    LAD 50% in stent re-stenosis, CX/RCA/LM no significant diaease.  Hyperlipidemia     Hypertension     ICD (implantable cardioverter-defibrillator), biventricular, in situ 03/20/2017    PAF (paroxysmal atrial fibrillation) ([facility])     Shingles      Past Surgical History:   Procedure Laterality Date    CARDIAC DEFIBRILLATOR PLACEMENT Left 03/20/2017    BiV/ICD     PTCA  8/13/98    stent LAD, Diag. branch    TOTAL KNEE ARTHROPLASTY  [id number]     Family History   Problem Relation Age of Onset    Heart Attack Mother     Heart Attack Father     Heart Attack Brother     Cancer Brother      Social History     Tobacco Use    Smoking status: Former Smoker    Smokeless tobacco: Never Used    Tobacco comment: reviewed 5/9/2016.   Quit many years ago   Substance Use Topics    Alcohol use: No     Alcohol/week: 0.0 standard drinks      @[facility]@  Review of Systems:   · Constitutional: No Fever or Weight Loss   · Eyes: No Decreased Vision  · ENT: No Headaches, Hearing Loss or Vertigo  · Cardiovascular: No chest pain, dyspnea on exertion, palpitations or loss of consciousness  · Respiratory: No cough or wheezing    · Gastrointestinal: No abdominal pain, appetite loss, blood in stools, constipation, diarrhea or heartburn  · Genitourinary: No dysuria, trouble voiding, or hematuria  · Musculoskeletal:  No gait disturbance, weakness or joint complaints  · Integumentary: No rash or

## 2019-09-10 ENCOUNTER — OFFICE VISIT (OUTPATIENT)
Dept: FAMILY MEDICINE CLINIC | Age: 84
End: 2019-09-10
Payer: COMMERCIAL

## 2019-09-10 VITALS
SYSTOLIC BLOOD PRESSURE: 108 MMHG | HEART RATE: 64 BPM | DIASTOLIC BLOOD PRESSURE: 64 MMHG | TEMPERATURE: 95.8 F | WEIGHT: 167 LBS | BODY MASS INDEX: 26.95 KG/M2 | OXYGEN SATURATION: 98 %

## 2019-09-10 DIAGNOSIS — M25.552 PAIN OF LEFT HIP JOINT: ICD-10-CM

## 2019-09-10 PROCEDURE — 99213 OFFICE O/P EST LOW 20 MIN: CPT | Performed by: FAMILY MEDICINE

## 2019-09-10 RX ORDER — TRAMADOL HYDROCHLORIDE 50 MG/1
50 TABLET ORAL EVERY 6 HOURS PRN
Qty: 120 TABLET | Refills: 5 | Status: SHIPPED | OUTPATIENT
Start: 2019-09-10 | End: 2020-03-10 | Stop reason: SDUPTHER

## 2019-09-10 ASSESSMENT — PATIENT HEALTH QUESTIONNAIRE - PHQ9
SUM OF ALL RESPONSES TO PHQ QUESTIONS 1-9: 0
SUM OF ALL RESPONSES TO PHQ9 QUESTIONS 1 & 2: 0
2. FEELING DOWN, DEPRESSED OR HOPELESS: 0
SUM OF ALL RESPONSES TO PHQ QUESTIONS 1-9: 0
1. LITTLE INTEREST OR PLEASURE IN DOING THINGS: 0

## 2019-09-13 ENCOUNTER — NURSE ONLY (OUTPATIENT)
Dept: FAMILY MEDICINE CLINIC | Age: 84
End: 2019-09-13
Payer: COMMERCIAL

## 2019-09-13 PROCEDURE — G0008 ADMIN INFLUENZA VIRUS VAC: HCPCS | Performed by: FAMILY MEDICINE

## 2019-09-13 PROCEDURE — 90653 IIV ADJUVANT VACCINE IM: CPT | Performed by: FAMILY MEDICINE

## 2019-09-18 ENCOUNTER — TELEPHONE (OUTPATIENT)
Dept: SURGERY | Age: 84
End: 2019-09-18

## 2019-09-18 NOTE — TELEPHONE ENCOUNTER
Patient's son called (joycelyn) says patient does not have knot in right groin area and is now showcasing any pain. Does not want to make appt at this time.

## 2019-09-23 ENCOUNTER — TELEPHONE (OUTPATIENT)
Dept: SURGERY | Age: 84
End: 2019-09-23

## 2019-09-23 NOTE — TELEPHONE ENCOUNTER
Called patient left message to schedule consult for recurrent right inguinal hernia ref Dr. Ct Ramírez

## 2019-11-20 RX ORDER — FUROSEMIDE 40 MG/1
60 TABLET ORAL 2 TIMES DAILY
Qty: 90 TABLET | Refills: 11 | OUTPATIENT
Start: 2019-11-20

## 2019-11-21 RX ORDER — FUROSEMIDE 40 MG/1
60 TABLET ORAL 2 TIMES DAILY
Qty: 90 TABLET | Refills: 3 | Status: ON HOLD | OUTPATIENT
Start: 2019-11-21 | End: 2020-01-19 | Stop reason: HOSPADM

## 2019-12-10 DIAGNOSIS — F51.01 PRIMARY INSOMNIA: ICD-10-CM

## 2019-12-10 RX ORDER — ZOLPIDEM TARTRATE 10 MG/1
10 TABLET ORAL NIGHTLY PRN
Qty: 30 TABLET | Refills: 5 | Status: SHIPPED | OUTPATIENT
Start: 2019-12-10 | End: 2020-03-10 | Stop reason: SDUPTHER

## 2019-12-15 ENCOUNTER — HOSPITAL ENCOUNTER (EMERGENCY)
Age: 84
Discharge: HOME | End: 2019-12-15
Attending: EMERGENCY MEDICINE
Payer: COMMERCIAL

## 2019-12-15 ENCOUNTER — APPOINTMENT (OUTPATIENT)
Dept: CT IMAGING | Age: 84
End: 2019-12-15
Payer: COMMERCIAL

## 2019-12-15 ENCOUNTER — APPOINTMENT (OUTPATIENT)
Dept: GENERAL RADIOLOGY | Age: 84
End: 2019-12-15
Payer: COMMERCIAL

## 2019-12-15 VITALS
SYSTOLIC BLOOD PRESSURE: 96 MMHG | OXYGEN SATURATION: 97 % | DIASTOLIC BLOOD PRESSURE: 68 MMHG | HEART RATE: 84 BPM | RESPIRATION RATE: 22 BRPM | WEIGHT: 167 LBS | BODY MASS INDEX: 26.95 KG/M2 | TEMPERATURE: 97.7 F

## 2019-12-15 DIAGNOSIS — R10.9 FLANK PAIN: Primary | ICD-10-CM

## 2019-12-15 DIAGNOSIS — R31.9 HEMATURIA, UNSPECIFIED TYPE: ICD-10-CM

## 2019-12-15 LAB
ALBUMIN SERPL-MCNC: 4 GM/DL (ref 3.4–5)
ALP BLD-CCNC: 70 IU/L (ref 40–129)
ALT SERPL-CCNC: 7 U/L (ref 10–40)
ANION GAP SERPL CALCULATED.3IONS-SCNC: 16 MMOL/L (ref 4–16)
AST SERPL-CCNC: 12 IU/L (ref 15–37)
BACTERIA: ABNORMAL /HPF
BASOPHILS ABSOLUTE: 0.1 K/CU MM
BASOPHILS RELATIVE PERCENT: 1 % (ref 0–1)
BILIRUB SERPL-MCNC: 1.2 MG/DL (ref 0–1)
BILIRUBIN URINE: NEGATIVE MG/DL
BLOOD, URINE: ABNORMAL
BUN BLDV-MCNC: 40 MG/DL (ref 6–23)
CALCIUM SERPL-MCNC: 10.2 MG/DL (ref 8.3–10.6)
CAST TYPE: ABNORMAL /HPF
CHLORIDE BLD-SCNC: 98 MMOL/L (ref 99–110)
CLARITY: ABNORMAL
CO2: 27 MMOL/L (ref 21–32)
COLOR: YELLOW
CREAT SERPL-MCNC: 2.4 MG/DL (ref 0.9–1.3)
CRYSTAL TYPE: ABNORMAL /HPF
DIFFERENTIAL TYPE: ABNORMAL
EOSINOPHILS ABSOLUTE: 0.2 K/CU MM
EOSINOPHILS RELATIVE PERCENT: 1.9 % (ref 0–3)
EPITHELIAL CELLS, UA: ABNORMAL /HPF
GFR AFRICAN AMERICAN: 31 ML/MIN/1.73M2
GFR NON-AFRICAN AMERICAN: 26 ML/MIN/1.73M2
GLUCOSE BLD-MCNC: 86 MG/DL (ref 70–99)
GLUCOSE, URINE: NEGATIVE MG/DL
HCT VFR BLD CALC: 52.9 % (ref 42–52)
HEMOGLOBIN: 16.7 GM/DL (ref 13.5–18)
IMMATURE NEUTROPHIL %: 0.3 % (ref 0–0.43)
KETONES, URINE: NEGATIVE MG/DL
LACTATE: 1.8 MMOL/L (ref 0.4–2)
LEUKOCYTE ESTERASE, URINE: ABNORMAL
LYMPHOCYTES ABSOLUTE: 1.3 K/CU MM
LYMPHOCYTES RELATIVE PERCENT: 14.1 % (ref 24–44)
MCH RBC QN AUTO: 29.7 PG (ref 27–31)
MCHC RBC AUTO-ENTMCNC: 31.6 % (ref 32–36)
MCV RBC AUTO: 94.1 FL (ref 78–100)
MONOCYTES ABSOLUTE: 1 K/CU MM
MONOCYTES RELATIVE PERCENT: 10.2 % (ref 0–4)
NITRITE URINE, QUANTITATIVE: NEGATIVE
PDW BLD-RTO: 15.2 % (ref 11.7–14.9)
PH, URINE: 6.5 (ref 5–8)
PLATELET # BLD: 199 K/CU MM (ref 140–440)
PMV BLD AUTO: 9.3 FL (ref 7.5–11.1)
POTASSIUM SERPL-SCNC: 3.7 MMOL/L (ref 3.5–5.1)
PRO-BNP: 3406 PG/ML
PROTEIN UA: NEGATIVE MG/DL
RBC # BLD: 5.62 M/CU MM (ref 4.6–6.2)
RBC URINE: ABNORMAL /HPF (ref 0–3)
SEGMENTED NEUTROPHILS ABSOLUTE COUNT: 6.8 K/CU MM
SEGMENTED NEUTROPHILS RELATIVE PERCENT: 72.5 % (ref 36–66)
SODIUM BLD-SCNC: 141 MMOL/L (ref 135–145)
SPECIFIC GRAVITY UA: 1.01 (ref 1–1.03)
TOTAL IMMATURE NEUTOROPHIL: 0.03 K/CU MM
TOTAL PROTEIN: 8 GM/DL (ref 6.4–8.2)
TROPONIN T: <0.01 NG/ML
UROBILINOGEN, URINE: 0.2 MG/DL (ref 0.2–1)
WBC # BLD: 9.3 K/CU MM (ref 4–10.5)
WBC UA: ABNORMAL /HPF (ref 0–2)

## 2019-12-15 PROCEDURE — 74176 CT ABD & PELVIS W/O CONTRAST: CPT

## 2019-12-15 PROCEDURE — 84484 ASSAY OF TROPONIN QUANT: CPT

## 2019-12-15 PROCEDURE — 81001 URINALYSIS AUTO W/SCOPE: CPT

## 2019-12-15 PROCEDURE — 80053 COMPREHEN METABOLIC PANEL: CPT

## 2019-12-15 PROCEDURE — 93005 ELECTROCARDIOGRAM TRACING: CPT | Performed by: EMERGENCY MEDICINE

## 2019-12-15 PROCEDURE — 99285 EMERGENCY DEPT VISIT HI MDM: CPT

## 2019-12-15 PROCEDURE — 83880 ASSAY OF NATRIURETIC PEPTIDE: CPT

## 2019-12-15 PROCEDURE — 6370000000 HC RX 637 (ALT 250 FOR IP): Performed by: EMERGENCY MEDICINE

## 2019-12-15 PROCEDURE — 83605 ASSAY OF LACTIC ACID: CPT

## 2019-12-15 PROCEDURE — 2580000003 HC RX 258: Performed by: EMERGENCY MEDICINE

## 2019-12-15 PROCEDURE — 85025 COMPLETE CBC W/AUTO DIFF WBC: CPT

## 2019-12-15 PROCEDURE — 71046 X-RAY EXAM CHEST 2 VIEWS: CPT

## 2019-12-15 RX ORDER — HYDROCODONE BITARTRATE AND ACETAMINOPHEN 5; 325 MG/1; MG/1
1 TABLET ORAL ONCE
Status: COMPLETED | OUTPATIENT
Start: 2019-12-15 | End: 2019-12-15

## 2019-12-15 RX ORDER — HYDROCODONE BITARTRATE AND ACETAMINOPHEN 5; 325 MG/1; MG/1
1 TABLET ORAL EVERY 6 HOURS PRN
Qty: 12 TABLET | Refills: 0 | Status: SHIPPED | OUTPATIENT
Start: 2019-12-15 | End: 2019-12-18

## 2019-12-15 RX ORDER — 0.9 % SODIUM CHLORIDE 0.9 %
250 INTRAVENOUS SOLUTION INTRAVENOUS ONCE
Status: COMPLETED | OUTPATIENT
Start: 2019-12-15 | End: 2019-12-15

## 2019-12-15 RX ADMIN — SODIUM CHLORIDE 250 ML: 9 INJECTION, SOLUTION INTRAVENOUS at 18:46

## 2019-12-15 RX ADMIN — HYDROCODONE BITARTRATE AND ACETAMINOPHEN 1 TABLET: 5; 325 TABLET ORAL at 18:46

## 2019-12-15 ASSESSMENT — PAIN SCALES - GENERAL
PAINLEVEL_OUTOF10: 5
PAINLEVEL_OUTOF10: 5

## 2019-12-17 PROCEDURE — 93010 ELECTROCARDIOGRAM REPORT: CPT | Performed by: INTERNAL MEDICINE

## 2020-01-01 ENCOUNTER — NURSE ONLY (OUTPATIENT)
Dept: FAMILY MEDICINE CLINIC | Age: 85
End: 2020-01-01
Payer: COMMERCIAL

## 2020-01-01 ENCOUNTER — OFFICE VISIT (OUTPATIENT)
Dept: CARDIOLOGY CLINIC | Age: 85
End: 2020-01-01
Payer: COMMERCIAL

## 2020-01-01 VITALS
HEART RATE: 72 BPM | SYSTOLIC BLOOD PRESSURE: 114 MMHG | WEIGHT: 156.6 LBS | BODY MASS INDEX: 25.17 KG/M2 | DIASTOLIC BLOOD PRESSURE: 76 MMHG | HEIGHT: 66 IN

## 2020-01-01 PROCEDURE — 99214 OFFICE O/P EST MOD 30 MIN: CPT | Performed by: NURSE PRACTITIONER

## 2020-01-01 PROCEDURE — G0008 ADMIN INFLUENZA VIRUS VAC: HCPCS | Performed by: FAMILY MEDICINE

## 2020-01-01 PROCEDURE — 90653 IIV ADJUVANT VACCINE IM: CPT | Performed by: FAMILY MEDICINE

## 2020-01-01 RX ORDER — CARVEDILOL 6.25 MG/1
6.25 TABLET ORAL 2 TIMES DAILY WITH MEALS
COMMUNITY
End: 2021-01-01

## 2020-01-01 ASSESSMENT — ENCOUNTER SYMPTOMS
EYE PAIN: 0
ABDOMINAL PAIN: 0
COLOR CHANGE: 0
HOARSE VOICE: 0
SHORTNESS OF BREATH: 0
POOR WOUND HEALING: 0

## 2020-01-17 LAB
EKG ATRIAL RATE: 77 BPM
EKG DIAGNOSIS: NORMAL
EKG P AXIS: 85 DEGREES
EKG P-R INTERVAL: 124 MS
EKG Q-T INTERVAL: 458 MS
EKG QRS DURATION: 186 MS
EKG QTC CALCULATION (BAZETT): 518 MS
EKG R AXIS: 226 DEGREES
EKG T AXIS: 2 DEGREES
EKG VENTRICULAR RATE: 77 BPM

## 2020-01-18 ENCOUNTER — HOSPITAL ENCOUNTER (OUTPATIENT)
Age: 85
Setting detail: OBSERVATION
Discharge: HOME | End: 2020-01-19
Attending: EMERGENCY MEDICINE | Admitting: HOSPITALIST
Payer: COMMERCIAL

## 2020-01-18 ENCOUNTER — APPOINTMENT (OUTPATIENT)
Dept: CT IMAGING | Age: 85
End: 2020-01-18
Payer: COMMERCIAL

## 2020-01-18 ENCOUNTER — APPOINTMENT (OUTPATIENT)
Dept: GENERAL RADIOLOGY | Age: 85
End: 2020-01-18
Payer: COMMERCIAL

## 2020-01-18 PROBLEM — R07.9 CHEST PAIN: Status: ACTIVE | Noted: 2020-01-18

## 2020-01-18 LAB
ALBUMIN SERPL-MCNC: 3.9 GM/DL (ref 3.4–5)
ALP BLD-CCNC: 82 IU/L (ref 40–129)
ALT SERPL-CCNC: <5 U/L (ref 10–40)
ANION GAP SERPL CALCULATED.3IONS-SCNC: 20 MMOL/L (ref 4–16)
AST SERPL-CCNC: 18 IU/L (ref 15–37)
BASOPHILS ABSOLUTE: 0.1 K/CU MM
BASOPHILS RELATIVE PERCENT: 1.2 % (ref 0–1)
BILIRUB SERPL-MCNC: 1.2 MG/DL (ref 0–1)
BUN BLDV-MCNC: 44 MG/DL (ref 6–23)
CALCIUM SERPL-MCNC: 10 MG/DL (ref 8.3–10.6)
CHLORIDE BLD-SCNC: 96 MMOL/L (ref 99–110)
CO2: 21 MMOL/L (ref 21–32)
CREAT SERPL-MCNC: 2.5 MG/DL (ref 0.9–1.3)
DIFFERENTIAL TYPE: ABNORMAL
EOSINOPHILS ABSOLUTE: 0.2 K/CU MM
EOSINOPHILS RELATIVE PERCENT: 2.2 % (ref 0–3)
GFR AFRICAN AMERICAN: 30 ML/MIN/1.73M2
GFR NON-AFRICAN AMERICAN: 25 ML/MIN/1.73M2
GLUCOSE BLD-MCNC: 118 MG/DL (ref 70–99)
HCT VFR BLD CALC: 55.8 % (ref 42–52)
HEMOGLOBIN: 17.6 GM/DL (ref 13.5–18)
IMMATURE NEUTROPHIL %: 0.3 % (ref 0–0.43)
LACTATE: 1.1 MMOL/L (ref 0.4–2)
LACTATE: ABNORMAL MMOL/L (ref 0.4–2)
LYMPHOCYTES ABSOLUTE: 1.9 K/CU MM
LYMPHOCYTES RELATIVE PERCENT: 21 % (ref 24–44)
MCH RBC QN AUTO: 29.9 PG (ref 27–31)
MCHC RBC AUTO-ENTMCNC: 31.5 % (ref 32–36)
MCV RBC AUTO: 94.7 FL (ref 78–100)
MONOCYTES ABSOLUTE: 1 K/CU MM
MONOCYTES RELATIVE PERCENT: 10.6 % (ref 0–4)
PDW BLD-RTO: 14.6 % (ref 11.7–14.9)
PLATELET # BLD: 169 K/CU MM (ref 140–440)
PMV BLD AUTO: 9.9 FL (ref 7.5–11.1)
POTASSIUM SERPL-SCNC: 3.9 MMOL/L (ref 3.5–5.1)
PRO-BNP: 2156 PG/ML
RBC # BLD: 5.89 M/CU MM (ref 4.6–6.2)
SEGMENTED NEUTROPHILS ABSOLUTE COUNT: 5.8 K/CU MM
SEGMENTED NEUTROPHILS RELATIVE PERCENT: 64.7 % (ref 36–66)
SODIUM BLD-SCNC: 137 MMOL/L (ref 135–145)
TOTAL IMMATURE NEUTOROPHIL: 0.03 K/CU MM
TOTAL PROTEIN: 8.1 GM/DL (ref 6.4–8.2)
TROPONIN T: 0.01 NG/ML
TROPONIN T: 0.01 NG/ML
TROPONIN T: <0.01 NG/ML
TROPONIN T: <0.01 NG/ML
WBC # BLD: 9 K/CU MM (ref 4–10.5)

## 2020-01-18 PROCEDURE — 6370000000 HC RX 637 (ALT 250 FOR IP): Performed by: EMERGENCY MEDICINE

## 2020-01-18 PROCEDURE — 85025 COMPLETE CBC W/AUTO DIFF WBC: CPT

## 2020-01-18 PROCEDURE — 99213 OFFICE O/P EST LOW 20 MIN: CPT | Performed by: INTERNAL MEDICINE

## 2020-01-18 PROCEDURE — 6370000000 HC RX 637 (ALT 250 FOR IP): Performed by: INTERNAL MEDICINE

## 2020-01-18 PROCEDURE — G0378 HOSPITAL OBSERVATION PER HR: HCPCS

## 2020-01-18 PROCEDURE — 96367 TX/PROPH/DG ADDL SEQ IV INF: CPT

## 2020-01-18 PROCEDURE — 2580000003 HC RX 258: Performed by: HOSPITALIST

## 2020-01-18 PROCEDURE — 87040 BLOOD CULTURE FOR BACTERIA: CPT

## 2020-01-18 PROCEDURE — 93010 ELECTROCARDIOGRAM REPORT: CPT | Performed by: INTERNAL MEDICINE

## 2020-01-18 PROCEDURE — 80053 COMPREHEN METABOLIC PANEL: CPT

## 2020-01-18 PROCEDURE — 2580000003 HC RX 258: Performed by: EMERGENCY MEDICINE

## 2020-01-18 PROCEDURE — 99285 EMERGENCY DEPT VISIT HI MDM: CPT

## 2020-01-18 PROCEDURE — 6360000002 HC RX W HCPCS: Performed by: EMERGENCY MEDICINE

## 2020-01-18 PROCEDURE — 71045 X-RAY EXAM CHEST 1 VIEW: CPT

## 2020-01-18 PROCEDURE — 36415 COLL VENOUS BLD VENIPUNCTURE: CPT

## 2020-01-18 PROCEDURE — 96365 THER/PROPH/DIAG IV INF INIT: CPT

## 2020-01-18 PROCEDURE — 6360000002 HC RX W HCPCS: Performed by: HOSPITALIST

## 2020-01-18 PROCEDURE — 83605 ASSAY OF LACTIC ACID: CPT

## 2020-01-18 PROCEDURE — 2580000003 HC RX 258: Performed by: INTERNAL MEDICINE

## 2020-01-18 PROCEDURE — 83880 ASSAY OF NATRIURETIC PEPTIDE: CPT

## 2020-01-18 PROCEDURE — 71250 CT THORAX DX C-: CPT

## 2020-01-18 PROCEDURE — 94761 N-INVAS EAR/PLS OXIMETRY MLT: CPT

## 2020-01-18 PROCEDURE — 93005 ELECTROCARDIOGRAM TRACING: CPT | Performed by: EMERGENCY MEDICINE

## 2020-01-18 PROCEDURE — 84484 ASSAY OF TROPONIN QUANT: CPT

## 2020-01-18 RX ORDER — CARVEDILOL 12.5 MG/1
12.5 TABLET ORAL 2 TIMES DAILY WITH MEALS
Status: DISCONTINUED | OUTPATIENT
Start: 2020-01-18 | End: 2020-01-19 | Stop reason: HOSPADM

## 2020-01-18 RX ORDER — MIDODRINE HYDROCHLORIDE 5 MG/1
5 TABLET ORAL 3 TIMES DAILY
Status: DISCONTINUED | OUTPATIENT
Start: 2020-01-18 | End: 2020-01-19 | Stop reason: HOSPADM

## 2020-01-18 RX ORDER — POTASSIUM CHLORIDE 7.45 MG/ML
10 INJECTION INTRAVENOUS PRN
Status: DISCONTINUED | OUTPATIENT
Start: 2020-01-18 | End: 2020-01-19 | Stop reason: HOSPADM

## 2020-01-18 RX ORDER — POTASSIUM CHLORIDE 20 MEQ/1
40 TABLET, EXTENDED RELEASE ORAL PRN
Status: DISCONTINUED | OUTPATIENT
Start: 2020-01-18 | End: 2020-01-19 | Stop reason: HOSPADM

## 2020-01-18 RX ORDER — FUROSEMIDE 40 MG/1
40 TABLET ORAL DAILY
Status: DISCONTINUED | OUTPATIENT
Start: 2020-01-19 | End: 2020-01-19 | Stop reason: HOSPADM

## 2020-01-18 RX ORDER — ASPIRIN 81 MG/1
81 TABLET, CHEWABLE ORAL DAILY
Status: DISCONTINUED | OUTPATIENT
Start: 2020-01-19 | End: 2020-01-19 | Stop reason: HOSPADM

## 2020-01-18 RX ORDER — ASPIRIN 81 MG/1
162 TABLET, CHEWABLE ORAL ONCE
Status: COMPLETED | OUTPATIENT
Start: 2020-01-18 | End: 2020-01-18

## 2020-01-18 RX ORDER — SODIUM CHLORIDE 0.9 % (FLUSH) 0.9 %
10 SYRINGE (ML) INJECTION EVERY 12 HOURS SCHEDULED
Status: DISCONTINUED | OUTPATIENT
Start: 2020-01-18 | End: 2020-01-19 | Stop reason: HOSPADM

## 2020-01-18 RX ORDER — TRAMADOL HYDROCHLORIDE 50 MG/1
50 TABLET ORAL EVERY 6 HOURS PRN
Status: DISCONTINUED | OUTPATIENT
Start: 2020-01-18 | End: 2020-01-19 | Stop reason: HOSPADM

## 2020-01-18 RX ORDER — BACLOFEN 10 MG/1
10 TABLET ORAL 3 TIMES DAILY
Status: DISCONTINUED | OUTPATIENT
Start: 2020-01-18 | End: 2020-01-19 | Stop reason: HOSPADM

## 2020-01-18 RX ORDER — SODIUM CHLORIDE 0.9 % (FLUSH) 0.9 %
10 SYRINGE (ML) INJECTION PRN
Status: DISCONTINUED | OUTPATIENT
Start: 2020-01-18 | End: 2020-01-19 | Stop reason: HOSPADM

## 2020-01-18 RX ORDER — MIDODRINE HYDROCHLORIDE 5 MG/1
5 TABLET ORAL 3 TIMES DAILY
COMMUNITY
End: 2021-01-01

## 2020-01-18 RX ORDER — 0.9 % SODIUM CHLORIDE 0.9 %
30 INTRAVENOUS SOLUTION INTRAVENOUS ONCE
Status: COMPLETED | OUTPATIENT
Start: 2020-01-18 | End: 2020-01-18

## 2020-01-18 RX ORDER — NITROGLYCERIN 0.4 MG/1
0.4 TABLET SUBLINGUAL EVERY 5 MIN PRN
Status: DISCONTINUED | OUTPATIENT
Start: 2020-01-18 | End: 2020-01-19 | Stop reason: HOSPADM

## 2020-01-18 RX ORDER — BACLOFEN 10 MG/1
10 TABLET ORAL 3 TIMES DAILY
COMMUNITY
End: 2021-01-01

## 2020-01-18 RX ORDER — ONDANSETRON 2 MG/ML
4 INJECTION INTRAMUSCULAR; INTRAVENOUS EVERY 6 HOURS PRN
Status: DISCONTINUED | OUTPATIENT
Start: 2020-01-18 | End: 2020-01-19 | Stop reason: HOSPADM

## 2020-01-18 RX ORDER — AZITHROMYCIN 500 MG/1
500 INJECTION, POWDER, LYOPHILIZED, FOR SOLUTION INTRAVENOUS ONCE
Status: DISCONTINUED | OUTPATIENT
Start: 2020-01-18 | End: 2020-01-18 | Stop reason: ALTCHOICE

## 2020-01-18 RX ORDER — METOLAZONE 2.5 MG/1
2.5 TABLET ORAL DAILY
Status: DISCONTINUED | OUTPATIENT
Start: 2020-01-19 | End: 2020-01-19 | Stop reason: HOSPADM

## 2020-01-18 RX ORDER — ATORVASTATIN CALCIUM 40 MG/1
80 TABLET, FILM COATED ORAL NIGHTLY
Status: DISCONTINUED | OUTPATIENT
Start: 2020-01-18 | End: 2020-01-19 | Stop reason: HOSPADM

## 2020-01-18 RX ADMIN — SACUBITRIL AND VALSARTAN 1 TABLET: 49; 51 TABLET, FILM COATED ORAL at 23:24

## 2020-01-18 RX ADMIN — SODIUM CHLORIDE 1914 ML: 9 INJECTION, SOLUTION INTRAVENOUS at 12:16

## 2020-01-18 RX ADMIN — MIDODRINE HYDROCHLORIDE 5 MG: 5 TABLET ORAL at 17:05

## 2020-01-18 RX ADMIN — ASPIRIN 81 MG 162 MG: 81 TABLET ORAL at 08:34

## 2020-01-18 RX ADMIN — MIDODRINE HYDROCHLORIDE 5 MG: 5 TABLET ORAL at 23:24

## 2020-01-18 RX ADMIN — APIXABAN 2.5 MG: 2.5 TABLET, FILM COATED ORAL at 23:24

## 2020-01-18 RX ADMIN — BACLOFEN 10 MG: 10 TABLET ORAL at 23:24

## 2020-01-18 RX ADMIN — AZITHROMYCIN MONOHYDRATE 500 MG: 500 INJECTION, POWDER, LYOPHILIZED, FOR SOLUTION INTRAVENOUS at 17:05

## 2020-01-18 RX ADMIN — ATORVASTATIN CALCIUM 80 MG: 40 TABLET, FILM COATED ORAL at 23:24

## 2020-01-18 RX ADMIN — CEFTRIAXONE SODIUM 1 G: 1 INJECTION, POWDER, FOR SOLUTION INTRAMUSCULAR; INTRAVENOUS at 12:26

## 2020-01-18 RX ADMIN — SODIUM CHLORIDE, PRESERVATIVE FREE 10 ML: 5 INJECTION INTRAVENOUS at 23:25

## 2020-01-18 ASSESSMENT — PAIN SCALES - GENERAL
PAINLEVEL_OUTOF10: 0
PAINLEVEL_OUTOF10: 2
PAINLEVEL_OUTOF10: 0
PAINLEVEL_OUTOF10: 0

## 2020-01-18 ASSESSMENT — PAIN DESCRIPTION - DESCRIPTORS: DESCRIPTORS: ACHING

## 2020-01-18 ASSESSMENT — PAIN DESCRIPTION - FREQUENCY: FREQUENCY: CONTINUOUS

## 2020-01-18 ASSESSMENT — PAIN DESCRIPTION - PAIN TYPE: TYPE: ACUTE PAIN

## 2020-01-18 ASSESSMENT — PAIN DESCRIPTION - LOCATION: LOCATION: CHEST

## 2020-01-18 ASSESSMENT — PAIN DESCRIPTION - ORIENTATION: ORIENTATION: MID

## 2020-01-18 NOTE — ED NOTES
Lab called with a Lactic Acid result of 2.2. Dr. Cynthia Starkey was notified.           Aleshia Avila RN  01/18/20 2778

## 2020-01-18 NOTE — ED NOTES
Pt transportation to [facility] arranged, awaiting for the transport team to arrive, pt updated on plan,      Emily Anthony RN  01/18/20 6233

## 2020-01-18 NOTE — ED NOTES
Contacted the [address] [place], [place] [address]cheryl Garrido again as the patient and family are now agreeable to admission.                    Yue Chambers RN  01/18/20 0000

## 2020-01-18 NOTE — ED PROVIDER NOTES
Attack Mother     Heart Attack Father     Heart Attack Brother     Cancer Brother      Social History     Socioeconomic History    Marital status:      Spouse name: Not on file    Number of children: Not on file    Years of education: Not on file    Highest education level: Not on file   Occupational History    Occupation: retired   Social Needs    Financial resource strain: Not on file    Food insecurity:     Worry: Not on file     Inability: Not on file   [employer] needs:     Medical: Not on file     Non-medical: Not on file   Tobacco Use    Smoking status: Former Smoker    Smokeless tobacco: Never Used    Tobacco comment: reviewed 5/9/2016.   Quit many years ago   Substance and Sexual Activity    Alcohol use: No     Alcohol/week: 0.0 standard drinks    Drug use: No    Sexual activity: Not Currently     Partners: Female     Comment:    Lifestyle    Physical activity:     Days per week: Not on file     Minutes per session: Not on file    Stress: Not on file   Relationships    Social connections:     Talks on phone: Not on file     Gets together: Not on file     Attends [employer] service: Not on file     Active member of club or organization: Not on file     Attends meetings of clubs or organizations: Not on file     Relationship status: Not on file    Intimate partner violence:     Fear of current or ex partner: Not on file     Emotionally abused: Not on file     Physically abused: Not on file     Forced sexual activity: Not on file   Other Topics Concern    Not on file   Social History Narrative    Not on file     Current Facility-Administered Medications   Medication Dose Route Frequency Provider Last Rate Last Dose    sodium chloride flush 0.9 % injection 10 mL  10 mL Intravenous 2 times per day Mara King MD        sodium chloride flush 0.9 % injection 10 mL  10 mL Intravenous PRN Mara King MD        azithromycin (ZITHROMAX) 500 mg in dextrose 5 % 250 mL IVPB 500 mg Intravenous Once Mohit Rojas MD         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    Plavix [Clopidogrel]      Blood in stool       Nursing Notes Reviewed    Physical Exam:  Triage VS:    ED Triage Vitals [01/18/20 0800]   Enc Vitals Group      BP (!) 152/100      Pulse 81      Resp 21      Temp       Temp Source Oral      SpO2 97 %      Weight 160 lb (72.6 kg)      Height 5' 6\" (1.676 m)      Head Circumference       Peak Flow       Pain Score       Pain Loc       Pain Edu? Excl. in [address]? Physical Exam  Vitals signs and nursing note reviewed. Constitutional:       General: He is not in acute distress. Appearance: He is well-developed. He is not diaphoretic. HENT:      Head: Normocephalic and atraumatic. Right Ear: External ear normal.      Left Ear: External ear normal.   Eyes:      Conjunctiva/sclera: Conjunctivae normal.      Pupils: Pupils are equal, round, and reactive to light. Neck:      Musculoskeletal: Normal range of motion. Cardiovascular:      Rate and Rhythm: Normal rate and regular rhythm. Pulses: Normal pulses. Radial pulses are 2+ on the right side and 2+ on the left side. Dorsalis pedis pulses are 2+ on the right side and 2+ on the left side. Heart sounds: Normal heart sounds. No murmur. Pulmonary:      Effort: Pulmonary effort is normal. Tachypnea present. No respiratory distress. Breath sounds: Normal breath sounds. Abdominal:      Palpations: Abdomen is soft. Tenderness: There is no tenderness. Musculoskeletal: Normal range of motion. Right lower leg: No edema. Left lower leg: No edema. Skin:     General: Skin is warm and dry. Capillary Refill: Capillary refill takes less than 2 seconds. Neurological:      General: No focal deficit present.       Mental Status: He is alert and oriented to person, place, and time.      Cranial Nerves: No cranial nerve deficit. Sensory: No sensory deficit. Motor: No weakness. Psychiatric:         Mood and Affect: Mood is anxious. My pulse ox interpretation is - normal    Procedures     MDM:  Vitals:    01/18/20 1445   BP: 131/76   Pulse: 65   Resp: 16   Temp: 97.4 °F (36.3 °C)   SpO2: 96%        I have reviewed and interpreted all of the currently available lab results from this visit (if applicable). Relevant results noted:  ED Course as of Jan 18 1548   Sat Jan 18, 2020   0827 Ventricular paced rhythm, PVCs, no Sgarbossa criteria, stable when compared to multiple prior EKGs   EKG 12 Lead []   0850 Troponin T: <0.010 [JH]   0855 Decreased from prior   Pro-BNP(!): 2,156 [JH]   0859 Creatinine(!): 2.5 [JH]   0913 Spoke with patient and son and informed him of results of work-up. Offered him admission to the hospital but the patient and his son did not want to at this time. I told him that we will observe him here for couple of hours and then repeat a troponin and EKG at that time. Assuming the troponin is negative and EKG is stable they are comfortable with being discharged as he can follow-up very soon with his primary care physician and at the [facility]. [JH]   1059 WBC: 9.0 [JH]   1112 Troponin T(!): 0.011 [JH]   1151 Lactate, ser/plas(!!): 2.2  CALLED TO JOEL ROSS 01/18/2020 [address]   [JH]   65 Spoke with Dr. Savannah Carmona, hospitalist at [facility], who accepted patient for admission    []      ED Course User Index  [] Pablo Mclain MD        CT CHEST [address]   Final Result   No acute cardiopulmonary process seen. XR CHEST PORTABLE   Final Result   No acute cardiopulmonary process. Discussion:  68-year-old male with history of severe CHF, CAD presents with chest pain or shortness of breath began acutely this morning after waking up from sleep.   Upon initial presentation he was tachypneic but otherwise in normal breath

## 2020-01-18 NOTE — ED NOTES
Up to the side of the bed to void, gait steady, son at bedside.  Pt denies any dizziness, no peripheral edema noted, pt denies any shortness of breath either, pain to his left chest is gone,      Radha Shannon RN  01/18/20 2252

## 2020-01-18 NOTE — ED NOTES
Pt transported to CT scan per cart, updated on admission status, family at bedside,      Meme Gaston RN  01/18/20 4266

## 2020-01-18 NOTE — PROGRESS NOTES
Patient arrived from [facility] ER. Received report from Angelica Hagan at 13:24.      Patient skin is intact as verified by second nurse upon arrival.

## 2020-01-18 NOTE — ED NOTES
Lactic acid level obtained and sent to the lab, pt is resting, denies any needs at this time.  Updated on plan of care so far, call light in reach,      Arian Rodriguez RN  01/18/20 5169

## 2020-01-18 NOTE — ED NOTES
Contacted the [facility] to begin the transfer process.                             Rashida Price RN  01/18/20 4938

## 2020-01-18 NOTE — ED NOTES
Contacted the [place] [id number] after Dr. Saul Blancas spoke with the son and the patient they are not wanting admission. The plan of care then will be to monitor the patient and repeat the 12 lead and Troponin.       Agustin Marin RN  01/18/20 6318

## 2020-01-18 NOTE — H&P
[facility]     History and Physical      Name:  Caleb Knutson /Age/Sex: 1933  (80 y.o. male)   MRN & CSN:  [id number] & [id number] Admission Date/Time: 2020  7:59 AM   Location:  [address] [place] [address]- PCP: Elroy Fleischer, MD       Hospital Day: 1    Assessment and Plan:   Caleb Knutson is a 80 y.o.  male with past medical history of CHF, CAD, AFIB, HTN, S/p ICD who was transferred from [facility] ED for chest pain and SOB    · Chest pain r/o ACS -  characterized as left sided dull pain, associated with SOB but no diaphoresis or syncopal feeling, ECG showing sinus rhythm with no ST changes first troponin negative, will repeat troponin X2, check Lipid panel, A1c in am, start on ASA, Nitro PRN, Lipitor,  Coreg, Consult Cardiology      · CAD - hx of Stents- Continue with Coreg, Entresto, Lipitor, ASA     · Transient Hypotension while in [place] ED - resolved with IV fluid bolus - monitor closely     · Chronic Systolic CHF - no sign of decompensation - resume home Coreg, Lasix with lower dose, Entresto     Other Chronic comorbidity conditions being addressed include:  Paroxysmal AFIB  Hypertension   Hyperlipidemia    S/p ICD     Diet DIET CARDIAC; No Caffeine   DVT Prophylaxis [] Lovenox, []  Heparin, [] SCDs, [] No VTE prophylaxis, patient ambulating   GI Prophylaxis [] PPI, [] H2 Blocker, [] No GI prophylaxis, patient is receiving diet/Tube Feeds   Code Status Full Code   Disposition Patient requires continued admission due to Chest pain r/o ACS   MDM [] Low, [] Moderate,[x]  High  Patient's risk as above due to possible ACS     History of Present Illness:     Chief Complaint: chest pain, SOB    Caleb Knutson is a 80 y.o.  male  with past medical history of CHF, CAD, AFIB, HTN, S/p ICD who was transferred from [facility] ED for chest pain and SOB. Chest pain started this morning around 5:30 am which woke him up from sleep - associated with SOB but no diaphoresis. Pain was left sided - dull pain, non-radiating. No aggravating factor but reduced with ASA in ED - has Nitro at home but didn't take it. No nausea or vomiting. Chest pain is less now and feels comfortable. Developed transient low BP in [place] ED for which he was given fluid bolus with correction. Ten point ROS reviewed negative, unless as noted above    Objective:   No intake or output data in the 24 hours ending 01/18/20 1556   Vitals:   Vitals:    01/18/20 1445   BP: 131/76   Pulse: 65   Resp: 16   Temp: 97.4 °F (36.3 °C)   SpO2: 96%     Physical Exam:    GEN Awake male resting in bed in no apparent distress. Appears given age. EYES Pupils are equally round. No scleral erythema, discharge, or conjunctivitis. HENT Mucous membranes are moist.   NECK No apparent thyromegaly or masses. RESP Clear to auscultation, no wheezes, rales or rhonchi. Symmetric chest movement while on room air. CARDIO/VASC S1/S2 auscultated. Regular rate without appreciable murmurs, rubs, or gallops. Peripheral pulses equal bilaterally and palpable. No peripheral edema. GI Abdomen is soft without significant tenderness, masses, or guarding. Bowel sounds are normoactive. Rectal exam deferred.  Mo catheter is not present. HEME/LYMPH No petechiae or ecchymoses. MSK No gross joint deformities. Spontaneous movement of all extremities  SKIN Normal coloration, warm, dry. NEURO Cranial nerves appear grossly intact, normal speech, no lateralizing weakness. PSYCH Awake, alert, oriented x 4. Affect appropriate. Past Medical History:      Past Medical History:   Diagnosis Date    Additional heart attack (anterior wall) 8/98    CAD (coronary artery disease)     [facility]congestive heart failure) ([facility])     Heart imaging 6/26/14    MUGA: Abnormal study, EF 39% global hypokinesis of LV.  History of cardiovascular stress test 6/4/2014 2003 6/14 EF 34% suggest prior ant and apical MI, LV function severely reduced 2003EF=36%,perf. diminished to anteroapical segment w/minimal Active member of club or organization: None     Attends meetings of clubs or organizations: None     Relationship status: None    Intimate partner violence:     Fear of current or ex partner: None     Emotionally abused: None     Physically abused: None     Forced sexual activity: None   Other Topics Concern    None   Social History Narrative    None       Medications:   Medications:    sodium chloride flush  10 mL Intravenous 2 times per day    azithromycin  500 mg Intravenous Once    apixaban  2.5 mg Oral BID    atorvastatin  80 mg Oral Nightly    baclofen  10 mg Oral TID    carvedilol  12.5 mg Oral BID WC    [START ON 1/19/2020] furosemide  40 mg Oral Daily    [START ON 1/19/2020] metOLazone  2.5 mg Oral Daily    midodrine  5 mg Oral TID    sacubitril-valsartan  1 tablet Oral BID    sodium chloride flush  10 mL Intravenous 2 times per day    [START ON 1/19/2020] aspirin  81 mg Oral Daily      Infusions:   PRN Meds: sodium chloride flush, 10 mL, PRN  nitroGLYCERIN, 0.4 mg, Q5 Min PRN  traMADol, 50 mg, Q6H PRN  sodium chloride flush, 10 mL, PRN  magnesium hydroxide, 30 mL, Daily PRN  ondansetron, 4 mg, Q6H PRN  potassium chloride, 40 mEq, PRN    Or  potassium alternative oral replacement, 40 mEq, PRN    Or  potassium chloride, 10 mEq, PRN      Home medications-   Prior to Admission medications    Medication Sig Start Date End Date Taking? Authorizing Provider   baclofen (LIORESAL) 10 MG tablet Take 10 mg by mouth 3 times daily   Yes Historical Provider, MD   midodrine (PROAMATINE) 5 MG tablet Take 5 mg by mouth 3 times daily   Yes Historical Provider, MD   apixaban (ELIQUIS) 2.5 MG TABS tablet Take 1 tablet by mouth 2 times daily 12/17/19  Yes Terrell Gosselin, MD   sacubitril-valsartan (ENTRESTO) 49-51 MG per tablet Take 1 tablet by mouth 2 times daily 12/17/19  Yes Terrell Gosselin, MD   furosemide (LASIX) 40 MG tablet Take 1.5 tablets by mouth 2 times daily 11/21/19  Yes Leonard Mata,

## 2020-01-19 VITALS
SYSTOLIC BLOOD PRESSURE: 91 MMHG | OXYGEN SATURATION: 94 % | DIASTOLIC BLOOD PRESSURE: 58 MMHG | BODY MASS INDEX: 26.23 KG/M2 | HEART RATE: 68 BPM | TEMPERATURE: 97.9 F | RESPIRATION RATE: 18 BRPM | HEIGHT: 66 IN | WEIGHT: 163.2 LBS

## 2020-01-19 LAB
ANION GAP SERPL CALCULATED.3IONS-SCNC: 13 MMOL/L (ref 4–16)
BUN BLDV-MCNC: 45 MG/DL (ref 6–23)
CALCIUM SERPL-MCNC: 9 MG/DL (ref 8.3–10.6)
CHLORIDE BLD-SCNC: 97 MMOL/L (ref 99–110)
CHOLESTEROL: 99 MG/DL
CO2: 29 MMOL/L (ref 21–32)
CREAT SERPL-MCNC: 2.7 MG/DL (ref 0.9–1.3)
ESTIMATED AVERAGE GLUCOSE: 111 MG/DL
GFR AFRICAN AMERICAN: 27 ML/MIN/1.73M2
GFR NON-AFRICAN AMERICAN: 23 ML/MIN/1.73M2
GLUCOSE BLD-MCNC: 86 MG/DL (ref 70–99)
HBA1C MFR BLD: 5.5 % (ref 4.2–6.3)
HCT VFR BLD CALC: 49.7 % (ref 42–52)
HDLC SERPL-MCNC: 41 MG/DL
HEMOGLOBIN: 15.7 GM/DL (ref 13.5–18)
LDL CHOLESTEROL DIRECT: 53 MG/DL
MAGNESIUM: 2 MG/DL (ref 1.8–2.4)
MCH RBC QN AUTO: 30.1 PG (ref 27–31)
MCHC RBC AUTO-ENTMCNC: 31.6 % (ref 32–36)
MCV RBC AUTO: 95.2 FL (ref 78–100)
PDW BLD-RTO: 14.7 % (ref 11.7–14.9)
PLATELET # BLD: 190 K/CU MM (ref 140–440)
PMV BLD AUTO: 9.5 FL (ref 7.5–11.1)
POTASSIUM SERPL-SCNC: 3.4 MMOL/L (ref 3.5–5.1)
RBC # BLD: 5.22 M/CU MM (ref 4.6–6.2)
SODIUM BLD-SCNC: 139 MMOL/L (ref 135–145)
TRIGL SERPL-MCNC: 83 MG/DL
WBC # BLD: 9.3 K/CU MM (ref 4–10.5)

## 2020-01-19 PROCEDURE — 36415 COLL VENOUS BLD VENIPUNCTURE: CPT

## 2020-01-19 PROCEDURE — G0378 HOSPITAL OBSERVATION PER HR: HCPCS

## 2020-01-19 PROCEDURE — 2580000003 HC RX 258: Performed by: INTERNAL MEDICINE

## 2020-01-19 PROCEDURE — 83721 ASSAY OF BLOOD LIPOPROTEIN: CPT

## 2020-01-19 PROCEDURE — 85027 COMPLETE CBC AUTOMATED: CPT

## 2020-01-19 PROCEDURE — 6370000000 HC RX 637 (ALT 250 FOR IP): Performed by: INTERNAL MEDICINE

## 2020-01-19 PROCEDURE — 83036 HEMOGLOBIN GLYCOSYLATED A1C: CPT

## 2020-01-19 PROCEDURE — 83735 ASSAY OF MAGNESIUM: CPT

## 2020-01-19 PROCEDURE — 80061 LIPID PANEL: CPT

## 2020-01-19 PROCEDURE — 80048 BASIC METABOLIC PNL TOTAL CA: CPT

## 2020-01-19 RX ORDER — FUROSEMIDE 40 MG/1
40 TABLET ORAL 2 TIMES DAILY
Qty: 90 TABLET | Refills: 3 | Status: CANCELLED | OUTPATIENT
Start: 2020-01-27

## 2020-01-19 RX ORDER — ASPIRIN 81 MG/1
81 TABLET ORAL DAILY
Qty: 30 TABLET | Refills: 3 | Status: SHIPPED | OUTPATIENT
Start: 2020-01-19 | End: 2020-01-01

## 2020-01-19 RX ORDER — POTASSIUM CHLORIDE 20 MEQ/1
20 TABLET, EXTENDED RELEASE ORAL DAILY
Qty: 90 TABLET | Refills: 1 | Status: SHIPPED | OUTPATIENT
Start: 2020-01-19 | End: 2021-01-01

## 2020-01-19 RX ORDER — FUROSEMIDE 40 MG/1
40 TABLET ORAL DAILY
Qty: 60 TABLET | Refills: 3 | Status: SHIPPED | OUTPATIENT
Start: 2020-01-25 | End: 2020-03-16 | Stop reason: SDUPTHER

## 2020-01-19 RX ADMIN — SACUBITRIL AND VALSARTAN 1 TABLET: 49; 51 TABLET, FILM COATED ORAL at 08:51

## 2020-01-19 RX ADMIN — APIXABAN 2.5 MG: 2.5 TABLET, FILM COATED ORAL at 08:50

## 2020-01-19 RX ADMIN — BACLOFEN 10 MG: 10 TABLET ORAL at 08:50

## 2020-01-19 RX ADMIN — CARVEDILOL 12.5 MG: 12.5 TABLET, FILM COATED ORAL at 09:31

## 2020-01-19 RX ADMIN — SODIUM CHLORIDE, PRESERVATIVE FREE 10 ML: 5 INJECTION INTRAVENOUS at 08:51

## 2020-01-19 RX ADMIN — MIDODRINE HYDROCHLORIDE 5 MG: 5 TABLET ORAL at 06:38

## 2020-01-19 RX ADMIN — ASPIRIN 81 MG 81 MG: 81 TABLET ORAL at 08:50

## 2020-01-19 RX ADMIN — POTASSIUM CHLORIDE 40 MEQ: 1500 TABLET, EXTENDED RELEASE ORAL at 08:50

## 2020-01-19 RX ADMIN — METOLAZONE 2.5 MG: 2.5 TABLET ORAL at 08:51

## 2020-01-19 RX ADMIN — FUROSEMIDE 40 MG: 40 TABLET ORAL at 08:50

## 2020-01-19 ASSESSMENT — PAIN SCALES - GENERAL: PAINLEVEL_OUTOF10: 0

## 2020-01-19 NOTE — PLAN OF CARE
Problem: Falls - Risk of:  Goal: Will remain free from falls  Description  Will remain free from falls  1/19/2020 0927 by Tran Stewart RN  Outcome: Ongoing  1/19/2020 0021 by Deric La RN  Outcome: Ongoing  Goal: Absence of physical injury  Description  Absence of physical injury  1/19/2020 0927 by Tran Stewart RN  Outcome: Ongoing  1/19/2020 0021 by Deric La RN  Outcome: Ongoing     Problem: Safety:  Goal: Free from accidental physical injury  Description  Free from accidental physical injury  Outcome: Ongoing  Goal: Free from intentional harm  Description  Free from intentional harm  Outcome: Ongoing     Problem: Daily Care:  Goal: Daily care needs are met  Description  Daily care needs are met  Outcome: Ongoing

## 2020-01-19 NOTE — DISCHARGE SUMMARY
[id number] Hospitalist     Discharge Summary    Name:  Miguel Peter /Age/Sex: 1933  (80 y.o. male)   MRN & CSN:  [id number] & [id number] Admission Date/Time: 2020  7:59 AM   Attending:  Steve Hernandez MD Discharging Physician: Steve Hernandez MD     HPI:     Please, see admission HPI in Chart/EPIC and patient's hospital course below    Hospital Course:   Miguel Peter is a 80 y.o.  male with past medical history of CHF, CAD, AFIB, HTN, S/p ICD who was transferred from [facility] ED for chest pain and SOB and the following assessments reflect patient's hospital course     Chest pain r/o ACS       Characterized as left sided dull pain, associated with SOB but no diaphoresis or syncopal feeling,   ECG showing sinus rhythm with no ST changes, troponin negative,  Evaluated by cardiology with recommendation to do ischemia work-up as an outpatient  Continue with ASA, Nitro PRN, Lipitor,  Coreg      CAD - hx of Stents- Continue with Coreg, Entresto, Lipitor, ASA      Transient Hypotension while in [place] ED - resolved with IV fluid bolus - monitor closely      Chronic Systolic CHF - no sign of decompensation - resume home Coreg, Lasix with lower dose, Entresto     Acute on chronic CKD Stage III - likely from aggressive diuresis -advised patient to hold Lasix for the next 5 days, repeat renal function test in 5 days, will have nephrology follow-up in a week - adjusted the Lasix dose from 60 twice daily to 40 mg daily-      Restart home medications for the following chronic conditions :  Paroxysmal AFIB  Hypertension   Hyperlipidemia    Cardiomyopathy - S/p ICD     Patient is hemodynamically stable for DC to home    The patient expressed appropriate understanding of and agreement with the discharge recommendations, medications, and plan.      Consults this admission:  IP CONSULT TO CARDIOLOGY    Discharge Instruction:   Follow up appointments: cardiology, nephrology   Primary care physician:  within 1 to 2 moist.  RESP Clear to auscultation, no wheezes, rales or rhonchi. CARDIO/VAS - S1/S2 auscultated. Regular rate without appreciable murmurs, rubs, or gallops. Peripheral pulses equal bilaterally and palpable. No peripheral edema. GI Abdomen is soft without significant tenderness, masses, or guarding. Bowel sounds are normoactive  MSK No gross joint deformities. Spontaneous movement of all extremities  SKIN Normal coloration, warm, dry. NEURO Cranial nerves appear grossly intact, normal speech, no lateralizing weakness.     BMP/CBC  Recent Labs     01/18/20  0805 01/19/20  0336    139   K 3.9 3.4*   CL 96* 97*   CO2 21 29   BUN 44* 45*   CREATININE 2.5* 2.7*   WBC 9.0 9.3   HCT 55.8* 49.7    190         Discharge Time of 31 minutes    Electronically signed by Juanita Schuler MD on 1/19/2020 at 10:06 AM

## 2020-01-20 ENCOUNTER — TELEPHONE (OUTPATIENT)
Dept: SURGERY | Age: 85
End: 2020-01-20

## 2020-01-22 LAB
EKG ATRIAL RATE: 80 BPM
EKG DIAGNOSIS: NORMAL
EKG Q-T INTERVAL: 472 MS
EKG QRS DURATION: 198 MS
EKG QTC CALCULATION (BAZETT): 544 MS
EKG R AXIS: 206 DEGREES
EKG T AXIS: -5 DEGREES
EKG VENTRICULAR RATE: 80 BPM

## 2020-01-23 LAB
CULTURE: NORMAL
CULTURE: NORMAL
Lab: NORMAL
Lab: NORMAL
SPECIMEN: NORMAL
SPECIMEN: NORMAL

## 2020-02-24 ENCOUNTER — TELEPHONE (OUTPATIENT)
Dept: CARDIOLOGY CLINIC | Age: 85
End: 2020-02-24

## 2020-03-10 ENCOUNTER — OFFICE VISIT (OUTPATIENT)
Dept: FAMILY MEDICINE CLINIC | Age: 85
End: 2020-03-10
Payer: COMMERCIAL

## 2020-03-10 VITALS
BODY MASS INDEX: 26.58 KG/M2 | TEMPERATURE: 97.6 F | SYSTOLIC BLOOD PRESSURE: 110 MMHG | HEART RATE: 77 BPM | WEIGHT: 164.7 LBS | DIASTOLIC BLOOD PRESSURE: 78 MMHG | OXYGEN SATURATION: 97 %

## 2020-03-10 PROCEDURE — 99214 OFFICE O/P EST MOD 30 MIN: CPT | Performed by: FAMILY MEDICINE

## 2020-03-10 RX ORDER — TRAMADOL HYDROCHLORIDE 50 MG/1
50 TABLET ORAL EVERY 6 HOURS PRN
Qty: 120 TABLET | Refills: 5 | Status: SHIPPED | OUTPATIENT
Start: 2020-03-10 | End: 2020-09-06

## 2020-03-10 RX ORDER — ZOLPIDEM TARTRATE 10 MG/1
10 TABLET ORAL NIGHTLY PRN
Qty: 30 TABLET | Refills: 5 | Status: SHIPPED | OUTPATIENT
Start: 2020-03-10 | End: 2020-09-06

## 2020-03-10 ASSESSMENT — PATIENT HEALTH QUESTIONNAIRE - PHQ9
SUM OF ALL RESPONSES TO PHQ9 QUESTIONS 1 & 2: 0
SUM OF ALL RESPONSES TO PHQ QUESTIONS 1-9: 0
2. FEELING DOWN, DEPRESSED OR HOPELESS: 0
SUM OF ALL RESPONSES TO PHQ QUESTIONS 1-9: 0
1. LITTLE INTEREST OR PLEASURE IN DOING THINGS: 0

## 2020-03-10 NOTE — PROGRESS NOTES
Hip Pain: Patient complains of left hip pain. Onset of the symptoms was several years ago. Inciting event: none. Current symptoms include is worse with weight bearing. Associated symptoms: none. Aggravating symptoms: any weight bearing. Patient's overall course: gradually worsening. Patient has had prior hip problems. Previous visits for this problem: yes, last seen a few months ago by me. Evaluation to date: plain films, which were not completed. Treatment to date: prescription analgesics, which have been effective. Current pain 7/10 off medication, decreases to 1-2/10 with Tramadol. The patient complains of insomnia. Onset was several years ago. Symptoms have worsened since his wife was admitted to an extended care facility. Patient describes symptoms as difficulty falling asleep. Patient has found complete relief with decreasing caffeine consumption, going to sleep at the same time each night and prescription sleep aid- Ambien- 10 mg. Associated symptoms include: none. Patient denies irritability, leg cramps, restless legs and snoring. Symptoms have been well-controlled. ROS: No TIA's or unusual headaches, no dysphagia. No prolonged cough. No dyspnea or chest pain on exertion. No abdominal pain, change in bowel habits, black or bloody stools. No urinary tract or BPH symptoms. Past Medical History:   Diagnosis Date    Additional heart attack (anterior wall) 8/98    CAD (coronary artery disease)     [facility]     Heart imaging 6/26/14    MUGA: Abnormal study, EF 39% global hypokinesis of LV.  History of cardiovascular stress test 6/4/2014 2003 6/14 EF 34% suggest prior ant and apical MI, LV function severely reduced 2003EF=36%,perf. diminished to anteroapical segment w/minimal redistribution: sugg. of prior MI.    History of echocardiogram 6/12/14 6/14EF50-55% Mild TR, right ventricular systolic pressure is elevated at 30-40mm Hg.     History of left heart catheterization 4/29/2016    LAD 50% in stent re-stenosis, CX/RCA/LM no significant diaease.  Hyperlipidemia     Hypertension     ICD (implantable cardioverter-defibrillator), biventricular, in situ 03/20/2017    PAF (paroxysmal atrial fibrillation) ([facility])     Shingles      Past Surgical History:   Procedure Laterality Date    CARDIAC DEFIBRILLATOR PLACEMENT Left 03/20/2017    BiV/ICD     PTCA  8/13/98    stent LAD, Diag. branch    TOTAL KNEE ARTHROPLASTY  [id number]     O:   Vitals:    03/10/20 1601   BP: 110/78   Pulse: 77   Temp: 97.6 °F (36.4 °C)   SpO2: 97%     No acute distress. Alert and Oriented x 3  HEENT: Atraumatic. Normocephalic. PERRLA, EOMI, Conjunctiva clear  NECK: without thyromegaly, lymphadenopathy, JVD  LUNGS:Clear to ascultation bilaterally. Breathing comfortably  CARDIOVASCULAR:  Regular rate and rhythm, no murmurs, rubs, or gallops  ABDOMEN: Soft, nontender, nondistended. No hepatosplenomegaly. No hernia on right which is reducible. Mildly tender  EXTREMITY: Full range of motion. No clubbing/cyanosis/edema. Antalgic gait  NEURO: Cranial nerves II-XII grossly intact. Strength 5/5, DTR 2/4. SKIN: Warm, Dry, No rash. PSYCH: Mood and Affect normal.    A:    Diagnosis Orders   1. Primary insomnia  zolpidem (AMBIEN) 10 MG tablet   2. Pain of left hip joint  traMADol (ULTRAM) 50 MG tablet     P: Continue Tramadol as needed  Continue Ambien as needed  Controlled Substances Monitoring:     RX Monitoring 3/10/2020   Attestation -   Periodic Controlled Substance Monitoring Possible medication side effects, risk of tolerance/dependence & alternative treatments discussed. ;No signs of potential drug abuse or diversion identified.;Obtaining appropriate analgesic effect of treatment. Chronic Pain > 50 MEDD Obtained or confirmed \"Consent for Opioid Use\" on file. Chronic Pain > 80 MEDD Obtained or confirmed \"Medication Contract\" on file.      F/u six months or PRN

## 2020-03-16 ENCOUNTER — OFFICE VISIT (OUTPATIENT)
Dept: CARDIOLOGY CLINIC | Age: 85
End: 2020-03-16
Payer: COMMERCIAL

## 2020-03-16 VITALS
DIASTOLIC BLOOD PRESSURE: 98 MMHG | BODY MASS INDEX: 26.93 KG/M2 | HEART RATE: 70 BPM | WEIGHT: 167.6 LBS | SYSTOLIC BLOOD PRESSURE: 140 MMHG | HEIGHT: 66 IN

## 2020-03-16 PROCEDURE — 99214 OFFICE O/P EST MOD 30 MIN: CPT | Performed by: INTERNAL MEDICINE

## 2020-03-16 RX ORDER — FUROSEMIDE 40 MG/1
40 TABLET ORAL 2 TIMES DAILY
Qty: 180 TABLET | Refills: 3 | Status: SHIPPED | OUTPATIENT
Start: 2020-03-16

## 2020-03-16 NOTE — PROGRESS NOTES
Tisha Riggins MD        OFFICE  FOLLOWUP NOTE    Chief complaints: patient is here for management of CAD, CHF, HTN, AFIB, DYSLIPIDEMIA, ICD  Subjective: patient feels better, no chest pain, no shortness of breath, no dizziness, no palpitations    HPI Alma iJ is a 80 y. o.year old who  has a past medical history of Additional heart attack (anterior wall), CAD (coronary artery disease), CHF (congestive heart failure) ([facility] [address] Heart imaging, History of cardiovascular stress test, History of echocardiogram, History of left heart catheterization, Hyperlipidemia, Hypertension, ICD (implantable cardioverter-defibrillator), biventricular, in situ, PAF (paroxysmal atrial fibrillation) ([facility] [address] and Shingles. and presents for management of CAD, CHF, HTN, AFIB, DYSLIPIDEMIA, ICD which are well controlled      Current Outpatient Medications   Medication Sig Dispense Refill    zolpidem (AMBIEN) 10 MG tablet Take 1 tablet by mouth nightly as needed for Sleep for up to 180 days. 30 tablet 5    sacubitril-valsartan (ENTRESTO) 49-51 MG per tablet Take 1 tablet by mouth 2 times daily 28 tablet 0    aspirin EC 81 MG EC tablet Take 1 tablet by mouth daily 30 tablet 3    furosemide (LASIX) 40 MG tablet Take 1 tablet by mouth daily (Patient taking differently: Take 40 mg by mouth 2 times daily ) 60 tablet 3    apixaban (ELIQUIS) 2.5 MG TABS tablet Take 1 tablet by mouth 2 times daily 42 tablet 0    traMADol (ULTRAM) 50 MG tablet Take 1 tablet by mouth every 6 hours as needed for Pain for up to 180 days.  (Patient not taking: Reported on 3/16/2020) 120 tablet 5    potassium chloride (KLOR-CON M) 20 MEQ extended release tablet Take 1 tablet by mouth daily (Patient not taking: Reported on 3/16/2020) 90 tablet 1    baclofen (LIORESAL) 10 MG tablet Take 10 mg by mouth 3 times daily      midodrine (PROAMATINE) 5 MG tablet Take 5 mg by mouth 3 times daily      carvedilol (COREG) 6.25 MG tablet Take 2 tablets by mouth 2 normal  Cardiovascular: (PMI) apex non displaced,no lifts no thrills, no s3,no s4, Normal heart rate, Normal rhythm, No murmurs, No rubs, No gallops. Carotid arteries pulse and amplitude are normal no bruit, no abdominal bruit noted ( normal abdominal aorta ausculation), femoral arteries pulse and amplitude are normal no bruit, pedal pulses are normal  Femoral pulses have normal amplitude, no bruits   Extremities - No cyanosis, clubbing, or significant edema, no varicose veins    Abdomen - No masses, tenderness, or organomegaly, no hepato-splenomegally, no bruits  Musculoskeletal - No significant edema, no kyphosis or scoliosis, no deformity in any extremity noted, muscle strength and tone are normal  Skin: no ulcer,no scar,no stasis dermatitis   Neurologic - alert oriented times 3,Cranial nerves II through XII are grossly intact. There were no gross focal neurologic abnormalities. All sensory and motor nerves examined and were normal  Psychiatric: normal mood and affect    Lab Results   Component Value Date    TROPONINI 0.029 05/17/2014     BNP:  No results found for: BNP  PT/INR:  No results found for: PTINR  Lab Results   Component Value Date    LABA1C 5.5 01/19/2020    LABA1C 5.5 09/18/2012     Lab Results   Component Value Date    CHOL 99 01/19/2020    TRIG 83 01/19/2020    HDL 41 01/19/2020    LDLCALC 71 02/13/2014    LDLDIRECT 53 01/19/2020     Lab Results   Component Value Date    ALT <5 (L) 01/18/2020    AST 18 01/18/2020     TSH:  No results found for: TSH    Impression:  Windy Song is a 80 y. o.year old who  has a past medical history of Additional heart attack (anterior wall), CAD (coronary artery disease), CHF (congestive heart failure) ([facility] [address] Heart imaging, History of cardiovascular stress test, History of echocardiogram, History of left heart catheterization, Hyperlipidemia, Hypertension, ICD (implantable cardioverter-defibrillator), biventricular, in situ, PAF (paroxysmal atrial fibrillation) ([facility] [address] and

## 2020-03-16 NOTE — LETTER
Maryla Boast Dr. Ellen Climes Ona Sour  7/31/1933  [id number]    Have you had any Chest Pain - No    Have you had any Shortness of Breath - No      Have you had any dizziness - No      Have you had any palpitations - No      Do you have any edema - No    Do you have a surgery or procedure scheduled in the near future - No

## 2020-04-20 NOTE — TELEPHONE ENCOUNTER
POA called for samples of Eliquis / Entresto   Patient POA aware that we are out of Eliquis   Wasn't sure if we were going to call   In a script diaz Eliquis   Drug Emily Agrawal

## 2020-05-12 ENCOUNTER — TELEPHONE (OUTPATIENT)
Dept: CARDIOLOGY CLINIC | Age: 85
End: 2020-05-12

## 2020-06-18 ENCOUNTER — TELEPHONE (OUTPATIENT)
Dept: CARDIOLOGY CLINIC | Age: 85
End: 2020-06-18

## 2020-07-31 ENCOUNTER — TELEPHONE (OUTPATIENT)
Dept: FAMILY MEDICINE CLINIC | Age: 85
End: 2020-07-31

## 2020-07-31 ENCOUNTER — TELEPHONE (OUTPATIENT)
Dept: CARDIOLOGY CLINIC | Age: 85
End: 2020-07-31

## 2020-07-31 NOTE — TELEPHONE ENCOUNTER
Spoke with son, he has already contacted PCP. States patient is prone to kidney stones, denies pain. He is just going to watch. Instructed patient is bleeding continues may hold ASA for few days. He stated that he believed you can hold Eliquis from time to time. Informed that this is not our practice, Eliquis is for stroke prevention d/t afib.  Reviewed with Shaji Hernandez NP

## 2020-07-31 NOTE — TELEPHONE ENCOUNTER
Patient's son called stating patient urinated a lot last night and this morning he had blood in his urine.

## 2020-08-11 ENCOUNTER — TELEPHONE (OUTPATIENT)
Dept: FAMILY MEDICINE CLINIC | Age: 85
End: 2020-08-11

## 2020-08-12 NOTE — TELEPHONE ENCOUNTER
Darinel Winters called state he never got a call back he called twice yesterday wants to know if you can give him a call at [phone].

## 2020-08-18 ENCOUNTER — TELEPHONE (OUTPATIENT)
Dept: CARDIOLOGY CLINIC | Age: 85
End: 2020-08-18

## 2020-09-10 ENCOUNTER — OFFICE VISIT (OUTPATIENT)
Dept: FAMILY MEDICINE CLINIC | Age: 85
End: 2020-09-10
Payer: COMMERCIAL

## 2020-09-10 VITALS
HEART RATE: 90 BPM | SYSTOLIC BLOOD PRESSURE: 130 MMHG | DIASTOLIC BLOOD PRESSURE: 86 MMHG | WEIGHT: 169.4 LBS | BODY MASS INDEX: 27.34 KG/M2 | TEMPERATURE: 96.9 F | OXYGEN SATURATION: 97 %

## 2020-09-10 PROCEDURE — 90715 TDAP VACCINE 7 YRS/> IM: CPT | Performed by: FAMILY MEDICINE

## 2020-09-10 PROCEDURE — G0439 PPPS, SUBSEQ VISIT: HCPCS | Performed by: FAMILY MEDICINE

## 2020-09-10 PROCEDURE — 90471 IMMUNIZATION ADMIN: CPT | Performed by: FAMILY MEDICINE

## 2020-09-10 PROCEDURE — 99214 OFFICE O/P EST MOD 30 MIN: CPT | Performed by: FAMILY MEDICINE

## 2020-09-10 RX ORDER — ESZOPICLONE 2 MG/1
2 TABLET, FILM COATED ORAL NIGHTLY
Qty: 30 TABLET | Refills: 5 | Status: SHIPPED | OUTPATIENT
Start: 2020-09-10 | End: 2020-01-01

## 2020-09-10 ASSESSMENT — PATIENT HEALTH QUESTIONNAIRE - PHQ9
5. POOR APPETITE OR OVEREATING: 0
10. IF YOU CHECKED OFF ANY PROBLEMS, HOW DIFFICULT HAVE THESE PROBLEMS MADE IT FOR YOU TO DO YOUR WORK, TAKE CARE OF THINGS AT HOME, OR GET ALONG WITH OTHER PEOPLE: 0
SUM OF ALL RESPONSES TO PHQ9 QUESTIONS 1 & 2: 4
1. LITTLE INTEREST OR PLEASURE IN DOING THINGS: 2
4. FEELING TIRED OR HAVING LITTLE ENERGY: 0
7. TROUBLE CONCENTRATING ON THINGS, SUCH AS READING THE NEWSPAPER OR WATCHING TELEVISION: 0
9. THOUGHTS THAT YOU WOULD BE BETTER OFF DEAD, OR OF HURTING YOURSELF: 0
3. TROUBLE FALLING OR STAYING ASLEEP: 2
SUM OF ALL RESPONSES TO PHQ QUESTIONS 1-9: 6
8. MOVING OR SPEAKING SO SLOWLY THAT OTHER PEOPLE COULD HAVE NOTICED. OR THE OPPOSITE, BEING SO FIGETY OR RESTLESS THAT YOU HAVE BEEN MOVING AROUND A LOT MORE THAN USUAL: 0
2. FEELING DOWN, DEPRESSED OR HOPELESS: 2
SUM OF ALL RESPONSES TO PHQ QUESTIONS 1-9: 6
6. FEELING BAD ABOUT YOURSELF - OR THAT YOU ARE A FAILURE OR HAVE LET YOURSELF OR YOUR FAMILY DOWN: 0

## 2020-09-10 ASSESSMENT — LIFESTYLE VARIABLES
HAS A RELATIVE, FRIEND, DOCTOR, OR ANOTHER HEALTH PROFESSIONAL EXPRESSED CONCERN ABOUT YOUR DRINKING OR SUGGESTED YOU CUT DOWN: 0
AUDIT TOTAL SCORE: 1
HOW OFTEN DURING THE LAST YEAR HAVE YOU FAILED TO DO WHAT WAS NORMALLY EXPECTED FROM YOU BECAUSE OF DRINKING: 0
HOW OFTEN DURING THE LAST YEAR HAVE YOU NEEDED AN ALCOHOLIC DRINK FIRST THING IN THE MORNING TO GET YOURSELF GOING AFTER A NIGHT OF HEAVY DRINKING: 0
HOW MANY STANDARD DRINKS CONTAINING ALCOHOL DO YOU HAVE ON A TYPICAL DAY: 0
AUDIT-C TOTAL SCORE: 1
HOW OFTEN DURING THE LAST YEAR HAVE YOU BEEN UNABLE TO REMEMBER WHAT HAPPENED THE NIGHT BEFORE BECAUSE YOU HAD BEEN DRINKING: 0
HOW OFTEN DO YOU HAVE SIX OR MORE DRINKS ON ONE OCCASION: 0
HOW OFTEN DURING THE LAST YEAR HAVE YOU FOUND THAT YOU WERE NOT ABLE TO STOP DRINKING ONCE YOU HAD STARTED: 0
HOW OFTEN DURING THE LAST YEAR HAVE YOU HAD A FEELING OF GUILT OR REMORSE AFTER DRINKING: 0
HOW OFTEN DO YOU HAVE A DRINK CONTAINING ALCOHOL: 1
HAVE YOU OR SOMEONE ELSE BEEN INJURED AS A RESULT OF YOUR DRINKING: 0

## 2020-09-10 NOTE — PROGRESS NOTES
The patient complains of insomnia. Onset was several years ago. Patient describes symptoms as frequent night time awakening and difficulty falling asleep. Patient has found moderate relief with decreasing caffeine consumption, going to sleep at the same time each night and prescription sleep aid- Ambien- 10 mg. Associated symptoms include: anxiety, daytime somnolence and depression. Patient denies irritability, leg cramps, restless legs and snoring. Symptoms have progressed to a point and plateaued. Hip Pain: Patient complains of left hip pain. Onset of the symptoms was several years ago. Inciting event: none. Current symptoms include is worse with weight bearing. Associated symptoms: none. Aggravating symptoms: any weight bearing. Patient's overall course: gradually worsening. Patient has had prior hip problems. Previous visits for this problem: yes, last seen a few months ago by me. Evaluation to date: plain films, which were not completed. Treatment to date: prescription analgesics, which have been effective. Current pain 4/10 off medication, decreases to 1-2/10 with Tramadol. Patient decided he did not want to take tramadol he more so he stopped it a couple of months ago and threw them away. He does not want anything for pain now. Depression: Patient complains of depression. He complains of anhedonia, depressed mood, difficulty concentrating, fatigue and insomnia. Onset was approximately a few months ago, unchanged since that time. He denies current suicidal and homicidal plan or intent. Family history significant for no psychiatric illness. Possible organic causes contributing are: none. Risk factors: negative life event Wife  on 2020. Patient did not have grief counseling. Patient states that he is \"doing okay. \"    ROS: No TIA's or unusual headaches, no dysphagia. No prolonged cough. No dyspnea or chest pain on exertion.   No abdominal pain, change in bowel habits, black or bloody stools. No urinary tract or BPH symptoms. No new or unusual musculoskeletal symptoms. Past Medical History:   Diagnosis Date    Additional heart attack (anterior wall) 8/98    CAD (coronary artery disease)     [facility]cong[facility]) ([facility])     Heart imaging 6/26/14    MUGA: Abnormal study, EF 39% global hypokinesis of LV.  History of cardiovascular stress test 6/4/2014 2003 6/14 EF 34% suggest prior ant and apical MI, LV function severely reduced 2003EF=36%,perf. diminished to anteroapical segment w/minimal redistribution: sugg. of prior MI.    History of echocardiogram 6/12/14 6/14EF50-55% Mild TR, right ventricular systolic pressure is elevated at 30-40mm Hg.  History of left heart catheterization 4/29/2016    LAD 50% in stent re-stenosis, CX/RCA/LM no significant diaease.  Hyperlipidemia     Hypertension     ICD (implantable cardioverter-defibrillator), biventricular, in situ 03/20/2017    PAF (paroxysmal atrial fibrillation) ([facility])     Shingles      Past Surgical History:   Procedure Laterality Date    CARDIAC DEFIBRILLATOR PLACEMENT Left 03/20/2017    BiV/ICD     PTCA  8/13/98    stent LAD, Diag. branch    TOTAL KNEE ARTHROPLASTY  [id number]     O:   Vitals:    09/10/20 1456   BP: 130/86   Pulse: 90   Temp: 96.9 °F (36.1 °C)   SpO2: 97%     No acute distress. Alert and Oriented x 3  HEENT: Atraumatic. Normocephalic. PERRLA, EOMI, Conjunctiva clear  NECK: without thyromegaly, lymphadenopathy, JVD  LUNGS:Clear to ascultation bilaterally. Breathing comfortably  CARDIOVASCULAR:  Regular rate and rhythm, no murmurs, rubs, or gallops  EXTREMITY: Full range of motion. No clubbing/cyanosis/edema  NEURO: Cranial nerves II-XII grossly intact. Strength 5/5, DTR 2/4. SKIN: Warm, Dry, No rash. PSYCH: Mood depressed and Affect tearful. A:    Diagnosis Orders   1. Primary insomnia  eszopiclone (LUNESTA) 2 MG TABS   2. Pain of left hip joint     3.  Grief       P: We will stop Ambien and try Lunesta. Discussed sleep hygiene. Recommended grief counseling. Patient does not want any pain medication for hip pain. Follow-up 6 months.

## 2020-09-10 NOTE — PROGRESS NOTES
Medicare Annual Wellness Visit  Name: Bigg More Date: 9/10/2020   MRN: [id number] Sex: Male   Age: 80 y.o. Ethnicity: Non-/Non    : 1933 Race: Luci Armenta is here for 6 Month Follow-Up (Pain and insomnia. ) and Medicare AWV    Screenings for behavioral, psychosocial and functional/safety risks, and cognitive dysfunction are all negative except as indicated below. These results, as well as other patient data from the [address] form, are documented in Flowsheets linked to this Encounter. 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 Authorizing Provider   eszopiclone (LUNESTA) 2 MG TABS Take 1 tablet by mouth nightly.  Yes Artemio Su MD   sacubitril-valsartan (ENTRESTO) 49-51 MG per tablet Take 1 tablet by mouth 2 times daily  Hay Bonilla MD   apixaban (ELIQUIS) 2.5 MG TABS tablet Take 1 tablet by mouth 2 times daily  Hay Bonilla MD   furosemide (LASIX) 40 MG tablet Take 1 tablet by mouth 2 times daily  Hay Bonilla MD   aspirin EC 81 MG EC tablet Take 1 tablet by mouth daily  Charles Rivera MD   potassium chloride (KLOR-CON M) 20 MEQ extended release tablet Take 1 tablet by mouth daily  Patient not taking: Reported on 3/16/2020  Charles Rivera MD   baclofen (LIORESAL) 10 MG tablet Take 10 mg by mouth 3 times daily  Historical Provider, MD   midodrine (PROAMATINE) 5 MG tablet Take 5 mg by mouth 3 times daily  Historical Provider, MD   carvedilol (COREG) 6.25 MG tablet Take 2 tablets by mouth 2 times daily (with meals)  Patient not taking: Reported on 3/16/2020  RUBA Jacob CNP   metolazone (ZAROXOLYN) 5 MG tablet Take 0.5 tablets by mouth daily as needed (chf symptoms, take 30 min prior to 1 lasix dose)  Patient not taking: Reported on 3/16/2020  Teresa Zavala MD   nitroGLYCERIN (NITROLINGUAL) 0.4 MG/SPRAY spray Place 1 spray under the tongue every 5 minutes as needed for Chest pain  Historical Provider, MD   atorvastatin (LIPITOR) 80 MG tablet Take 1 tablet by mouth nightly  Patient not taking: Reported on 3/16/2020  Vasquez Peguero MD       Past Medical History:   Diagnosis Date    Additional heart attack (anterior wall) 8/98    CAD (coronary artery disease)     CHF [facility]congestive heart failure) ([facility] [address]     Heart imaging 6/26/14    MUGA: Abnormal study, EF 39% global hypokinesis of LV.  History of cardiovascular stress test 6/4/2014 2003 6/14 EF 34% suggest prior ant and apical MI, LV function severely reduced 2003EF=36%,perf. diminished to anteroapical segment w/minimal redistribution: sugg. of prior MI.    History of echocardiogram 6/12/14 6/14EF50-55% Mild TR, right ventricular systolic pressure is elevated at 30-40mm Hg.  History of left heart catheterization 4/29/2016    LAD 50% in stent re-stenosis, CX/RCA/LM no significant diaease. 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  branch    TOTAL KNEE ARTHROPLASTY  [id number]       Family History   Problem Relation Age of Onset    Heart Attack Mother     Heart Attack Father     Heart Attack Brother     Cancer Brother        CareTeam (Including outside providers/suppliers regularly involved in providing care):   Patient Care Team:  Elsa Bird MD as PCP - General (Family Medicine)  Elsa Bird MD as PCP - [facility] Empaneled Provider  Vasquez Peguero MD as Consulting Physician (Cardiology)    Wt Readings from Last 3 Encounters:   09/10/20 169 lb 6.4 oz (76.8 kg)   03/16/20 167 lb 9.6 oz (76 kg)   03/10/20 164 lb 11.2 oz (74.7 kg)     Vitals:    09/10/20 1456   BP: 130/86   Site: Left Upper Arm   Position: Sitting   Cuff Size: Medium Adult   Pulse: 90   Temp: 96.9 °F (36.1 °C)   TempSrc: Infrared   SpO2: 97%   Weight: 169 lb 6.4 oz (76.8 kg)     Body mass index is 27.34 kg/m². Based upon direct observation of the patient, evaluation of cognition reveals recent and remote memory intact. Patient's complete Health Risk Assessment and screening values have been reviewed and are found in Flowsheets. The following problems were reviewed today and where indicated follow up appointments were made and/or referrals ordered. Positive Risk Factor Screenings with Interventions:     Depression:  PHQ-2 Score: 4  PHQ-9 Total Score: 6    Severity:1-4 = minimal depression, 5-9 = mild depression, 10-14 = moderate depression, 15-19 = moderately severe depression, 20-27 = severe depression  Depression Interventions:  · Regular exercise recommended- 3-5 times per week, 30-45 minutes per session  · Patient declines any further evaluation/treatment for this issue    Health Habits/Nutrition:  Health Habits/Nutrition  Do you exercise for at least 20 minutes 2-3 times per week?: Yes  Have you lost any weight without trying in the past 3 months?: No  Do you eat fewer than 2 meals per day?: No  Have you seen a dentist within the past year?: (!) No  Body mass index is 27.34 kg/m².   Health Habits/Nutrition Interventions:  · Dental exam overdue:  patient encouraged to make appointment with his/her dentist    Hearing/Vision:  No exam data present  Hearing/Vision  Do you or your family notice any trouble with your hearing?: No  Do you have difficulty driving, watching TV, or doing any of your daily activities because of your eyesight?: No  Have you had an eye exam within the past year?: (!) No  Hearing/Vision Interventions:  · Vision concerns:  patient encouraged to make appointment with his/her eye specialist    Personalized Preventive Plan   Current Health Maintenance Status  Immunization History   Administered Date(s) Administered    Influenza Vaccine, unspecified formulation 10/27/2016    Influenza, High Dose (Fluzone 65 yrs and older) 09/23/2015, 10/16/2018    Influenza, Quadv, IM, PF (6 mo and older Fluzone, Flulaval, Fluarix, and 3 yrs and older Afluria) 03/21/2017, 12/13/2017    Influenza, Triv, inactivated, subunit, adjuvanted, IM (Fluad 65 yrs and older) 09/13/2019    Pneumococcal Conjugate 13-valent ([id number]) 09/23/2015    Pneumococcal Polysaccharide ([id number]) 12/13/2017        Health Maintenance   Topic Date Due    DTaP/Tdap/Td vaccine (1 - Tdap) 07/31/1952    Shingles Vaccine (1 of 2) 07/31/1983    Annual Wellness Visit (AWV)  05/29/2019    Flu vaccine (1) 09/01/2020    Lipid screen  01/19/2021    Potassium monitoring  01/19/2021    Creatinine monitoring  01/19/2021    Pneumococcal 65+ years Vaccine  Completed    Hepatitis A vaccine  Aged Out    Hepatitis B vaccine  Aged Out    Hib vaccine  Aged Out    Meningococcal (ACWY) vaccine  Aged Out     Recommendations for [employer] Due: see orders and patient instructions/AVS.  . Recommended screening schedule for the next 5-10 years is provided to the patient in written form: see Patient Mary Zambrano was seen today for 6 month follow-up and medicare awv. Diagnoses and all orders for this visit:    Primary insomnia  -     eszopiclone (LUNESTA) 2 MG TABS; Take 1 tablet by mouth nightly. Pain of left hip joint    Grief    Routine general medical examination at a health care facility    Other orders  -     Tdap (age 6y and older) IM [de-identified])                Advance Care Planning   Advanced Care Planning: Discussed the patients choices for care and treatment in case of a health event that adversely affects decision-making abilities. Also discussed the patients long-term treatment options. Reviewed with the patient the [address] [place] [address] & [facility] Declaration forms  Reviewed the process of designating a competent adult as an Agent (or -in-fact) that could take make health care decisions for the patient if incompetent. Patient was asked to complete the declaration forms, either acknowledge the forms by a public notary or an eligible witness and provide a signed copy to the practice office. Time spent (minutes): 5    Cardiovascular Disease Risk Counseling: Assessed the patient's risk to develop cardiovascular disease and reviewed main risk factors. Reviewed steps to reduce disease risk including:   · Quitting tobacco use, reducing amount smoked, or not starting the habit  · Making healthy food choices  · Being physically active and gradualy increasing activity levels   · Reduce weight and determine a healthy BMI goal  · Monitor blood pressure and treat if higher than 140/90 mmHg  · Maintain blood total cholesterol levels under 5 mmol/l or 190 mg/dl  · Maintain LDL cholesterol levels under 3.0 mmol/l or 115 mg/dl   · Control blood glucose levels  · Consider taking aspirin (75 mg daily), once blood pressure is controlled   Provided a follow up plan.   Time spent (minutes): 3

## 2020-09-10 NOTE — PATIENT INSTRUCTIONS
Advance Directives: Care Instructions  Overview  An advance directive is a legal way to state your wishes at the end of your life. It tells your family and your doctor what to do if you can't say what you want. There are two main types of advance directives. You can change them any time your wishes change. Living will. This form tells your family and your doctor your wishes about life support and other treatment. The form is also called a declaration. Medical power of . This form lets you name a person to make treatment decisions for you when you can't speak for yourself. This person is called a health care agent (health care proxy, health care surrogate). The form is also called a durable power of  for health care. If you do not have an advance directive, decisions about your medical care may be made by a family member, or by a doctor or a  who doesn't know you. It may help to think of an advance directive as a gift to the people who care for you. If you have one, they won't have to make tough decisions by themselves. Follow-up care is a key part of your treatment and safety. Be sure to make and go to all appointments, and call your doctor if you are having problems. It's also a good idea to know your test results and keep a list of the medicines you take. What should you include in an advance directive? Many states have a unique advance directive form. (It may ask you to address specific issues.) Or you might use a universal form that's approved by many states. If your form doesn't tell you what to address, it may be hard to know what to include in your advance directive. Use the questions below to help you get started. · Who do you want to make decisions about your medical care if you are not able to? · What life-support measures do you want if you have a serious illness that gets worse over time or can't be cured? · What are you most afraid of that might happen? glaucoma; cataracts, macular degeneration, and other eye disorders. · A preventive dental visit is recommended every 6 months. · Try to get at least 150 minutes of exercise per week or 10,000 steps per day on a pedometer . · Order or download the FREE \"Exercise & Physical Activity: Your Everyday Guide\" from [employer]. Call 1-572.199.1923 or search [employer] online. · You need 2725-4298 mg of calcium and 5336-5523 IU of vitamin D per day. It is possible to meet your calcium requirement with diet alone, but a vitamin D supplement is usually necessary to meet this goal.  · When exposed to the sun, use a sunscreen that protects against both UVA and UVB radiation with an SPF of 30 or greater. Reapply every 2 to 3 hours or after sweating, drying off with a towel, or swimming. · Always wear a seat belt when traveling in a car. Always wear a helmet when riding a bicycle or motorcycle.

## 2020-10-01 NOTE — PROGRESS NOTES
[facility] ([facility] [place]  Sharla Galeana  Phone: [phone]    Fax [phone]                  Diane Dinero MD, Jb Tineo MD, [address] [facility], MD, MD Lazaro Keller MD Eusebio Lands, MD Tobi Serge, APRN               Luisa Ganser, APRN    Javy Toledo, APRN              Antonia Daly, APRN            10/1/2020    RE: Estil Bence  (7/31/1933)                               TO:  Dr. Conor Braxton MD  The primary cardiologist is Dr. Eufemia Rivera    CC:   1. Severe left ventricular systolic dysfunction    2. Coronary artery disease involving native coronary artery of native heart without angina pectoris    3. Hyperlipidemia, unspecified hyperlipidemia type    4. PAF (paroxysmal atrial fibrillation) ([facility])         HPI:    Thank you for involving me in taking care of your patient Estil Bence. Estil Bence is a 80y.o. year old male with a history as listed above and is being seen in the office today. Mr. Fadi Pederson lost his wife earlier this year. He is moved in with his son. He is tearful today. States that he has been feeling well he does note an occasional palpitation that is brief in nature. Lasting for a second or so. He also notes occasional dizziness with position change. Denies any syncope. Denies any swelling to the lower extremities. States that he is compliant with his medication.         Current Outpatient Medications   Medication Sig Dispense Refill    carvedilol (COREG) 6.25 MG tablet Take 6.25 mg by mouth 2 times daily (with meals)      sacubitril-valsartan (ENTRESTO) 49-51 MG per tablet Take 1 tablet by mouth 2 times daily 56 tablet 0    apixaban (ELIQUIS) 2.5 MG TABS tablet Take 1 tablet by mouth 2 times daily 42 tablet 0    furosemide (LASIX) 40 MG tablet Take 1 tablet by mouth 2 times daily (Patient taking differently: Take 40 mg by mouth daily ) 180 tablet 3    potassium chloride (KLOR-CON M) 20 MEQ extended release tablet Take 1 tablet by mouth daily 90 tablet 1    metolazone (ZAROXOLYN) 5 MG tablet Take 0.5 tablets by mouth daily as needed (chf symptoms, take 30 min prior to 1 lasix dose) 30 tablet 0    nitroGLYCERIN (NITROLINGUAL) 0.4 MG/SPRAY spray Place 1 spray under the tongue every 5 minutes as needed for Chest pain      baclofen (LIORESAL) 10 MG tablet Take 10 mg by mouth 3 times daily      midodrine (PROAMATINE) 5 MG tablet Take 5 mg by mouth 3 times daily      atorvastatin (LIPITOR) 80 MG tablet Take 1 tablet by mouth nightly (Patient not taking: Reported on 3/16/2020) 90 tablet 3     No current facility-administered medications for this visit. Allergies: Pcn [penicillins]; Nsaids; and Plavix [clopidogrel]  Past Medical History:   Diagnosis Date    Additional heart attack (anterior wall) 8/98    CAD (coronary artery disease)     [facility]     Heart imaging 6/26/14    MUGA: Abnormal study, EF 39% global hypokinesis of LV.  History of cardiovascular stress test 6/4/2014 2003 6/14 EF 34% suggest prior ant and apical MI, LV function severely reduced 2003EF=36%,perf. diminished to anteroapical segment w/minimal redistribution: sugg. of prior MI.    History of echocardiogram 6/12/14 6/14EF50-55% Mild TR, right ventricular systolic pressure is elevated at 30-40mm Hg.  History of left heart catheterization 4/29/2016    LAD 50% in stent re-stenosis, CX/RCA/LM no significant diaease. 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  branch    TOTAL KNEE ARTHROPLASTY  [id number]     Family History   Problem Relation Age of Onset    Heart Attack Mother     Heart Attack Father     Heart Attack Brother     Cancer Brother      Social History     Tobacco Use    Smoking status: Former Smoker    Smokeless tobacco: Never Used    Tobacco comment: reviewed 5/9/2016. Quit many years ago   Substance Use Topics    Alcohol use: No     Alcohol/week: 0.0 standard drinks        Review of Systems   Constitution: Negative for diaphoresis and malaise/fatigue. HENT: Negative for congestion and hoarse voice. Eyes: Negative for pain and visual disturbance. Cardiovascular: Positive for palpitations. Negative for chest pain, dyspnea on exertion, near-syncope and paroxysmal nocturnal dyspnea. Respiratory: Negative for shortness of breath. Endocrine: Negative for cold intolerance and heat intolerance. Hematologic/Lymphatic: Negative for adenopathy and bleeding problem. Skin: Negative for color change, poor wound healing and rash. Musculoskeletal: Positive for arthritis. Negative for muscle weakness. Gastrointestinal: Negative for abdominal pain and melena. Genitourinary: Negative for flank pain and hematuria. Neurological: Positive for dizziness. Negative for disturbances in coordination and light-headedness. Psychiatric/Behavioral: Positive for depression. The patient is not nervous/anxious. Objective:      Physical Exam:  /76   Pulse 72   Ht 5' 6\" (1.676 m)   Wt 156 lb 9.6 oz (71 kg)   BMI 25.28 kg/m²   Wt Readings from Last 3 Encounters:   10/01/20 156 lb 9.6 oz (71 kg)   09/10/20 169 lb 6.4 oz (76.8 kg)   03/16/20 167 lb 9.6 oz (76 kg)     Body mass index is 25.28 kg/m². Physical Exam  Constitutional:       Appearance: Normal appearance. HENT:      Head: Normocephalic and atraumatic. Right Ear: External ear normal.      Left Ear: External ear normal.      Nose: Nose normal.      Mouth/Throat:      Mouth: Mucous membranes are moist.      Pharynx: Oropharynx is clear.    Eyes:      Extraocular Movements: Extraocular movements intact. Pupils: Pupils are equal, round, and reactive to light. Neck:      Musculoskeletal: Normal range of motion and neck supple. Cardiovascular:      Rate and Rhythm: Normal rate and regular rhythm. Pulses: Normal pulses. Heart sounds: Murmur present. Pulmonary:      Effort: Pulmonary effort is normal.      Breath sounds: Normal breath sounds. Chest:      Comments: Left-sided ICD pocket intact  Abdominal:      General: There is no distension. Palpations: Abdomen is soft. Tenderness: There is no abdominal tenderness. Musculoskeletal: Normal range of motion. Right lower leg: No edema. Left lower leg: No edema. Skin:     General: Skin is warm. Capillary Refill: Capillary refill takes less than 2 seconds. Neurological:      General: No focal deficit present. Mental Status: He is alert and oriented to person, place, and time. Psychiatric:         Mood and Affect: Affect is tearful.          Behavior: Behavior normal.              DATA  BNP:   Lab Results   Component Value Date    PROBNP 2,156 (H) 01/18/2020     CBC:   Lab Results   Component Value Date    WBC 9.3 01/19/2020    RBC 5.22 01/19/2020    HGB 15.7 01/19/2020    HCT 49.7 01/19/2020     01/19/2020     CMP:    Lab Results   Component Value Date     01/19/2020    K 3.4 01/19/2020    K 4.0 02/27/2018    CL 97 01/19/2020    CO2 29 01/19/2020    BUN 45 01/19/2020    CREATININE 2.7 01/19/2020    GFRAA 27 01/19/2020    LABGLOM 23 01/19/2020    LABGLOM 27 12/22/2017    GLUCOSE 86 01/19/2020    CALCIUM 9.0 01/19/2020     Hepatic Function Panel:    Lab Results   Component Value Date    ALKPHOS 82 01/18/2020    ALT <5 01/18/2020    AST 18 01/18/2020    PROT 8.1 01/18/2020    PROT 7.3 09/18/2012    BILITOT 1.2 01/18/2020    LABALBU 3.9 01/18/2020     Magnesium:    Lab Results   Component Value Date    MG 2.0 01/19/2020     PT/INR:    Lab Results   Component Value Date No    Patient activity reported by the device to be 0.8 hr/day   The underlying rhythm is .  60.4 % atrial paced; 94.3 % ventricular paced. The patient is pacemaker dependent. HF management parameter are with in normal limits    Atrial fib   YUMIKO VASC of 6   Rate controlled yes. Rhythm is regular   He is  on anticoagulation- continue low dose eliquis        Lifestyle and risk factor modificatons discussed. Various goals are discussed and questions answered. Continue current medications. Appropriate prescriptions are addressed. Questions answered and patient verbalizes understanding.    Office visit in 6 months with ICD check

## 2020-11-03 PROBLEM — I10 HYPERTENSION: Status: RESOLVED | Noted: 2020-01-01 | Resolved: 2020-01-01

## 2020-11-03 PROBLEM — R07.9 CHEST PAIN: Status: RESOLVED | Noted: 2020-01-18 | Resolved: 2020-01-01

## 2020-11-03 PROBLEM — I25.10 CAD (CORONARY ARTERY DISEASE): Status: RESOLVED | Noted: 2020-01-01 | Resolved: 2020-01-01

## 2021-01-01 ENCOUNTER — CARE COORDINATION (OUTPATIENT)
Dept: CASE MANAGEMENT | Age: 86
End: 2021-01-01

## 2021-01-01 ENCOUNTER — HOSPITAL ENCOUNTER (INPATIENT)
Age: 86
LOS: 5 days | Discharge: HOME HEALTH | DRG: 286 | End: 2021-01-25
Attending: INTERNAL MEDICINE | Admitting: INTERNAL MEDICINE
Payer: MEDICARE

## 2021-01-01 ENCOUNTER — APPOINTMENT (OUTPATIENT)
Dept: GENERAL RADIOLOGY | Age: 86
DRG: 286 | End: 2021-01-01
Payer: MEDICARE

## 2021-01-01 ENCOUNTER — OFFICE VISIT (OUTPATIENT)
Dept: FAMILY MEDICINE CLINIC | Age: 86
End: 2021-01-01
Payer: COMMERCIAL

## 2021-01-01 ENCOUNTER — TELEPHONE (OUTPATIENT)
Dept: FAMILY MEDICINE CLINIC | Age: 86
End: 2021-01-01

## 2021-01-01 ENCOUNTER — TELEPHONE (OUTPATIENT)
Dept: CARDIOLOGY CLINIC | Age: 86
End: 2021-01-01

## 2021-01-01 VITALS
OXYGEN SATURATION: 95 % | SYSTOLIC BLOOD PRESSURE: 130 MMHG | DIASTOLIC BLOOD PRESSURE: 72 MMHG | HEART RATE: 87 BPM | BODY MASS INDEX: 27.18 KG/M2 | TEMPERATURE: 96 F | WEIGHT: 168.4 LBS

## 2021-01-01 VITALS
TEMPERATURE: 97.4 F | DIASTOLIC BLOOD PRESSURE: 66 MMHG | HEART RATE: 77 BPM | BODY MASS INDEX: 26.03 KG/M2 | RESPIRATION RATE: 21 BRPM | OXYGEN SATURATION: 94 % | WEIGHT: 162 LBS | SYSTOLIC BLOOD PRESSURE: 100 MMHG | HEIGHT: 66 IN

## 2021-01-01 DIAGNOSIS — R79.89 ELEVATED BRAIN NATRIURETIC PEPTIDE (BNP) LEVEL: ICD-10-CM

## 2021-01-01 DIAGNOSIS — R07.9 CHEST PAIN, UNSPECIFIED TYPE: Primary | ICD-10-CM

## 2021-01-01 DIAGNOSIS — R06.02 SHORTNESS OF BREATH: ICD-10-CM

## 2021-01-01 DIAGNOSIS — I50.9 ACUTE ON CHRONIC CONGESTIVE HEART FAILURE, UNSPECIFIED HEART FAILURE TYPE (HCC): Primary | ICD-10-CM

## 2021-01-01 DIAGNOSIS — Z95.810 AICD (AUTOMATIC CARDIOVERTER/DEFIBRILLATOR) PRESENT: ICD-10-CM

## 2021-01-01 DIAGNOSIS — N18.4 CKD (CHRONIC KIDNEY DISEASE) STAGE 4, GFR 15-29 ML/MIN (HCC): ICD-10-CM

## 2021-01-01 DIAGNOSIS — M10.9 ACUTE GOUT INVOLVING TOE OF RIGHT FOOT, UNSPECIFIED CAUSE: ICD-10-CM

## 2021-01-01 DIAGNOSIS — Z09 HOSPITAL DISCHARGE FOLLOW-UP: ICD-10-CM

## 2021-01-01 DIAGNOSIS — R77.8 ELEVATED TROPONIN: ICD-10-CM

## 2021-01-01 LAB
ALBUMIN SERPL-MCNC: 3.1 GM/DL (ref 3.4–5)
ALBUMIN SERPL-MCNC: 3.1 GM/DL (ref 3.4–5)
ALBUMIN SERPL-MCNC: 3.4 GM/DL (ref 3.4–5)
ALBUMIN SERPL-MCNC: 3.4 GM/DL (ref 3.4–5)
ALP BLD-CCNC: 57 IU/L (ref 40–129)
ALP BLD-CCNC: 60 IU/L (ref 40–128)
ALP BLD-CCNC: 67 IU/L (ref 40–128)
ALP BLD-CCNC: 69 IU/L (ref 40–129)
ALT SERPL-CCNC: 10 U/L (ref 10–40)
ALT SERPL-CCNC: 11 U/L (ref 10–40)
ALT SERPL-CCNC: 7 U/L (ref 10–40)
ALT SERPL-CCNC: 8 U/L (ref 10–40)
ANION GAP SERPL CALCULATED.3IONS-SCNC: 10 MMOL/L (ref 4–16)
ANION GAP SERPL CALCULATED.3IONS-SCNC: 11 MMOL/L (ref 4–16)
ANION GAP SERPL CALCULATED.3IONS-SCNC: 12 MMOL/L (ref 4–16)
ANION GAP SERPL CALCULATED.3IONS-SCNC: 12 MMOL/L (ref 4–16)
AST SERPL-CCNC: 11 IU/L (ref 15–37)
AST SERPL-CCNC: 15 IU/L (ref 15–37)
AST SERPL-CCNC: 9 IU/L (ref 15–37)
AST SERPL-CCNC: 9 IU/L (ref 15–37)
BACTERIA: NEGATIVE /HPF
BASE EXCESS: 1 (ref 0–3.3)
BASOPHILS ABSOLUTE: 0.1 K/CU MM
BASOPHILS ABSOLUTE: 0.1 K/CU MM
BASOPHILS RELATIVE PERCENT: 1 % (ref 0–1)
BASOPHILS RELATIVE PERCENT: 1.1 % (ref 0–1)
BILIRUB SERPL-MCNC: 1.4 MG/DL (ref 0–1)
BILIRUB SERPL-MCNC: 1.7 MG/DL (ref 0–1)
BILIRUB SERPL-MCNC: 1.8 MG/DL (ref 0–1)
BILIRUB SERPL-MCNC: 1.9 MG/DL (ref 0–1)
BILIRUBIN URINE: NEGATIVE MG/DL
BLOOD, URINE: ABNORMAL
BUN BLDV-MCNC: 28 MG/DL (ref 6–23)
BUN BLDV-MCNC: 30 MG/DL (ref 6–23)
BUN BLDV-MCNC: 31 MG/DL (ref 6–23)
BUN BLDV-MCNC: 31 MG/DL (ref 6–23)
CALCIUM SERPL-MCNC: 8.4 MG/DL (ref 8.3–10.6)
CALCIUM SERPL-MCNC: 8.5 MG/DL (ref 8.3–10.6)
CALCIUM SERPL-MCNC: 8.8 MG/DL (ref 8.3–10.6)
CALCIUM SERPL-MCNC: 9.4 MG/DL (ref 8.3–10.6)
CARBON MONOXIDE, BLOOD: 2.4 % (ref 0–5)
CHLORIDE BLD-SCNC: 100 MMOL/L (ref 99–110)
CHLORIDE BLD-SCNC: 102 MMOL/L (ref 99–110)
CHLORIDE BLD-SCNC: 103 MMOL/L (ref 99–110)
CHLORIDE BLD-SCNC: 104 MMOL/L (ref 99–110)
CHOLESTEROL: 101 MG/DL
CLARITY: CLEAR
CO2 CONTENT: 23.2 MMOL/L (ref 19–24)
CO2: 23 MMOL/L (ref 21–32)
CO2: 24 MMOL/L (ref 21–32)
CO2: 24 MMOL/L (ref 21–32)
CO2: 25 MMOL/L (ref 21–32)
COLOR: YELLOW
COMMENT: ABNORMAL
CREAT SERPL-MCNC: 2.2 MG/DL (ref 0.9–1.3)
CREAT SERPL-MCNC: 2.4 MG/DL (ref 0.9–1.3)
CREAT SERPL-MCNC: 2.5 MG/DL (ref 0.9–1.3)
CREAT SERPL-MCNC: 2.6 MG/DL (ref 0.9–1.3)
DIFFERENTIAL TYPE: ABNORMAL
DIFFERENTIAL TYPE: ABNORMAL
EKG ATRIAL RATE: 71 BPM
EKG ATRIAL RATE: 78 BPM
EKG ATRIAL RATE: 78 BPM
EKG DIAGNOSIS: NORMAL
EKG P AXIS: 37 DEGREES
EKG P AXIS: 37 DEGREES
EKG P-R INTERVAL: 150 MS
EKG P-R INTERVAL: 150 MS
EKG P-R INTERVAL: 166 MS
EKG Q-T INTERVAL: 482 MS
EKG Q-T INTERVAL: 482 MS
EKG Q-T INTERVAL: 512 MS
EKG QRS DURATION: 184 MS
EKG QRS DURATION: 184 MS
EKG QRS DURATION: 208 MS
EKG QTC CALCULATION (BAZETT): 549 MS
EKG QTC CALCULATION (BAZETT): 549 MS
EKG QTC CALCULATION (BAZETT): 556 MS
EKG R AXIS: 228 DEGREES
EKG R AXIS: 247 DEGREES
EKG R AXIS: 247 DEGREES
EKG T AXIS: 43 DEGREES
EKG T AXIS: 43 DEGREES
EKG T AXIS: 63 DEGREES
EKG VENTRICULAR RATE: 71 BPM
EKG VENTRICULAR RATE: 78 BPM
EKG VENTRICULAR RATE: 78 BPM
EOSINOPHILS ABSOLUTE: 0.2 K/CU MM
EOSINOPHILS ABSOLUTE: 0.4 K/CU MM
EOSINOPHILS RELATIVE PERCENT: 2.5 % (ref 0–3)
EOSINOPHILS RELATIVE PERCENT: 5.6 % (ref 0–3)
GFR AFRICAN AMERICAN: 28 ML/MIN/1.73M2
GFR AFRICAN AMERICAN: 30 ML/MIN/1.73M2
GFR AFRICAN AMERICAN: 31 ML/MIN/1.73M2
GFR AFRICAN AMERICAN: 34 ML/MIN/1.73M2
GFR NON-AFRICAN AMERICAN: 23 ML/MIN/1.73M2
GFR NON-AFRICAN AMERICAN: 25 ML/MIN/1.73M2
GFR NON-AFRICAN AMERICAN: 26 ML/MIN/1.73M2
GFR NON-AFRICAN AMERICAN: 28 ML/MIN/1.73M2
GLUCOSE BLD-MCNC: 80 MG/DL (ref 70–99)
GLUCOSE BLD-MCNC: 83 MG/DL (ref 70–99)
GLUCOSE BLD-MCNC: 95 MG/DL (ref 70–99)
GLUCOSE BLD-MCNC: 96 MG/DL (ref 70–99)
GLUCOSE, URINE: NEGATIVE MG/DL
HCO3 ARTERIAL: 22.2 MMOL/L (ref 18–23)
HCT VFR BLD CALC: 43.9 % (ref 42–52)
HCT VFR BLD CALC: 46.8 % (ref 42–52)
HCT VFR BLD CALC: 48.7 % (ref 42–52)
HDLC SERPL-MCNC: 40 MG/DL
HEMOGLOBIN: 13.8 GM/DL (ref 13.5–18)
HEMOGLOBIN: 14.4 GM/DL (ref 13.5–18)
HEMOGLOBIN: 14.9 GM/DL (ref 13.5–18)
IMMATURE NEUTROPHIL %: 0.2 % (ref 0–0.43)
IMMATURE NEUTROPHIL %: 0.3 % (ref 0–0.43)
KETONES, URINE: NEGATIVE MG/DL
LDL CHOLESTEROL DIRECT: 54 MG/DL
LEUKOCYTE ESTERASE, URINE: NEGATIVE
LV EF: 20 %
LVEF MODALITY: NORMAL
LYMPHOCYTES ABSOLUTE: 0.6 K/CU MM
LYMPHOCYTES ABSOLUTE: 0.9 K/CU MM
LYMPHOCYTES RELATIVE PERCENT: 12.5 % (ref 24–44)
LYMPHOCYTES RELATIVE PERCENT: 7.3 % (ref 24–44)
MAGNESIUM: 2.7 MG/DL (ref 1.8–2.4)
MCH RBC QN AUTO: 29.6 PG (ref 27–31)
MCH RBC QN AUTO: 30 PG (ref 27–31)
MCH RBC QN AUTO: 30.1 PG (ref 27–31)
MCHC RBC AUTO-ENTMCNC: 30.6 % (ref 32–36)
MCHC RBC AUTO-ENTMCNC: 30.8 % (ref 32–36)
MCHC RBC AUTO-ENTMCNC: 31.4 % (ref 32–36)
MCV RBC AUTO: 95.6 FL (ref 78–100)
MCV RBC AUTO: 96.8 FL (ref 78–100)
MCV RBC AUTO: 97.5 FL (ref 78–100)
METHEMOGLOBIN ARTERIAL: 1.1 %
MONOCYTES ABSOLUTE: 0.6 K/CU MM
MONOCYTES ABSOLUTE: 0.7 K/CU MM
MONOCYTES RELATIVE PERCENT: 10.2 % (ref 0–4)
MONOCYTES RELATIVE PERCENT: 7.7 % (ref 0–4)
MUCUS: ABNORMAL HPF
NITRITE URINE, QUANTITATIVE: NEGATIVE
NUCLEATED RBC %: 0 %
NUCLEATED RBC %: 0 %
O2 SATURATION: 94 % (ref 96–97)
PCO2 ARTERIAL: 32 MMHG (ref 32–45)
PDW BLD-RTO: 14.9 % (ref 11.7–14.9)
PH BLOOD: 7.45 (ref 7.34–7.45)
PH, URINE: 6 (ref 5–8)
PHOSPHORUS: 3.1 MG/DL (ref 2.5–4.9)
PLATELET # BLD: 157 K/CU MM (ref 140–440)
PLATELET # BLD: 177 K/CU MM (ref 140–440)
PLATELET # BLD: 194 K/CU MM (ref 140–440)
PMV BLD AUTO: 9.4 FL (ref 7.5–11.1)
PMV BLD AUTO: 9.5 FL (ref 7.5–11.1)
PMV BLD AUTO: 9.6 FL (ref 7.5–11.1)
PO2 ARTERIAL: 79 MMHG (ref 75–100)
POTASSIUM SERPL-SCNC: 3.8 MMOL/L (ref 3.5–5.1)
POTASSIUM SERPL-SCNC: 4.1 MMOL/L (ref 3.5–5.1)
POTASSIUM SERPL-SCNC: 4.3 MMOL/L (ref 3.5–5.1)
POTASSIUM SERPL-SCNC: 4.5 MMOL/L (ref 3.5–5.1)
PRO-BNP: ABNORMAL PG/ML
PROTEIN UA: NEGATIVE MG/DL
RBC # BLD: 4.59 M/CU MM (ref 4.6–6.2)
RBC # BLD: 4.8 M/CU MM (ref 4.6–6.2)
RBC # BLD: 5.03 M/CU MM (ref 4.6–6.2)
RBC URINE: 29 /HPF (ref 0–3)
SARS-COV-2, NAAT: NOT DETECTED
SEGMENTED NEUTROPHILS ABSOLUTE COUNT: 5.1 K/CU MM
SEGMENTED NEUTROPHILS ABSOLUTE COUNT: 6.8 K/CU MM
SEGMENTED NEUTROPHILS RELATIVE PERCENT: 70.3 % (ref 36–66)
SEGMENTED NEUTROPHILS RELATIVE PERCENT: 81.3 % (ref 36–66)
SODIUM BLD-SCNC: 134 MMOL/L (ref 135–145)
SODIUM BLD-SCNC: 137 MMOL/L (ref 135–145)
SODIUM BLD-SCNC: 139 MMOL/L (ref 135–145)
SODIUM BLD-SCNC: 140 MMOL/L (ref 135–145)
SOURCE: NORMAL
SPECIFIC GRAVITY UA: 1.01 (ref 1–1.03)
SPERM: ABNORMAL /HFP
TOTAL IMMATURE NEUTOROPHIL: 0.02 K/CU MM
TOTAL IMMATURE NEUTOROPHIL: 0.02 K/CU MM
TOTAL NUCLEATED RBC: 0 K/CU MM
TOTAL NUCLEATED RBC: 0 K/CU MM
TOTAL PROTEIN: 5.6 GM/DL (ref 6.4–8.2)
TOTAL PROTEIN: 5.8 GM/DL (ref 6.4–8.2)
TOTAL PROTEIN: 6.3 GM/DL (ref 6.4–8.2)
TOTAL PROTEIN: 6.7 GM/DL (ref 6.4–8.2)
TRICHOMONAS: ABNORMAL /HPF
TRIGL SERPL-MCNC: 63 MG/DL
TROPONIN T: 0.02 NG/ML
UROBILINOGEN, URINE: 1 MG/DL (ref 0.2–1)
WBC # BLD: 7.2 K/CU MM (ref 4–10.5)
WBC # BLD: 8.3 K/CU MM (ref 4–10.5)
WBC # BLD: 8.6 K/CU MM (ref 4–10.5)
WBC UA: 2 /HPF (ref 0–2)
YEAST: ABNORMAL /HPF

## 2021-01-01 PROCEDURE — 99284 EMERGENCY DEPT VISIT MOD MDM: CPT

## 2021-01-01 PROCEDURE — 80061 LIPID PANEL: CPT

## 2021-01-01 PROCEDURE — 94640 AIRWAY INHALATION TREATMENT: CPT

## 2021-01-01 PROCEDURE — 80053 COMPREHEN METABOLIC PANEL: CPT

## 2021-01-01 PROCEDURE — 2580000003 HC RX 258: Performed by: NURSE PRACTITIONER

## 2021-01-01 PROCEDURE — 6370000000 HC RX 637 (ALT 250 FOR IP): Performed by: PHYSICIAN ASSISTANT

## 2021-01-01 PROCEDURE — 6370000000 HC RX 637 (ALT 250 FOR IP): Performed by: NURSE PRACTITIONER

## 2021-01-01 PROCEDURE — 6370000000 HC RX 637 (ALT 250 FOR IP): Performed by: INTERNAL MEDICINE

## 2021-01-01 PROCEDURE — 1200000000 HC SEMI PRIVATE

## 2021-01-01 PROCEDURE — 2700000000 HC OXYGEN THERAPY PER DAY

## 2021-01-01 PROCEDURE — APPSS45 APP SPLIT SHARED TIME 31-45 MINUTES: Performed by: NURSE PRACTITIONER

## 2021-01-01 PROCEDURE — 6360000004 HC RX CONTRAST MEDICATION

## 2021-01-01 PROCEDURE — 84100 ASSAY OF PHOSPHORUS: CPT

## 2021-01-01 PROCEDURE — 83721 ASSAY OF BLOOD LIPOPROTEIN: CPT

## 2021-01-01 PROCEDURE — 85025 COMPLETE CBC W/AUTO DIFF WBC: CPT

## 2021-01-01 PROCEDURE — 36415 COLL VENOUS BLD VENIPUNCTURE: CPT

## 2021-01-01 PROCEDURE — C1894 INTRO/SHEATH, NON-LASER: HCPCS

## 2021-01-01 PROCEDURE — 84484 ASSAY OF TROPONIN QUANT: CPT

## 2021-01-01 PROCEDURE — 99232 SBSQ HOSP IP/OBS MODERATE 35: CPT | Performed by: INTERNAL MEDICINE

## 2021-01-01 PROCEDURE — 71045 X-RAY EXAM CHEST 1 VIEW: CPT

## 2021-01-01 PROCEDURE — 6360000002 HC RX W HCPCS

## 2021-01-01 PROCEDURE — 93306 TTE W/DOPPLER COMPLETE: CPT

## 2021-01-01 PROCEDURE — 82803 BLOOD GASES ANY COMBINATION: CPT

## 2021-01-01 PROCEDURE — 2580000003 HC RX 258: Performed by: INTERNAL MEDICINE

## 2021-01-01 PROCEDURE — 81001 URINALYSIS AUTO W/SCOPE: CPT

## 2021-01-01 PROCEDURE — 99222 1ST HOSP IP/OBS MODERATE 55: CPT | Performed by: INTERNAL MEDICINE

## 2021-01-01 PROCEDURE — 94761 N-INVAS EAR/PLS OXIMETRY MLT: CPT

## 2021-01-01 PROCEDURE — 2500000003 HC RX 250 WO HCPCS

## 2021-01-01 PROCEDURE — 93010 ELECTROCARDIOGRAM REPORT: CPT | Performed by: INTERNAL MEDICINE

## 2021-01-01 PROCEDURE — 93458 L HRT ARTERY/VENTRICLE ANGIO: CPT | Performed by: INTERNAL MEDICINE

## 2021-01-01 PROCEDURE — 4A023N7 MEASUREMENT OF CARDIAC SAMPLING AND PRESSURE, LEFT HEART, PERCUTANEOUS APPROACH: ICD-10-PCS | Performed by: INTERNAL MEDICINE

## 2021-01-01 PROCEDURE — 93458 L HRT ARTERY/VENTRICLE ANGIO: CPT

## 2021-01-01 PROCEDURE — 83735 ASSAY OF MAGNESIUM: CPT

## 2021-01-01 PROCEDURE — 6360000002 HC RX W HCPCS: Performed by: INTERNAL MEDICINE

## 2021-01-01 PROCEDURE — 83880 ASSAY OF NATRIURETIC PEPTIDE: CPT

## 2021-01-01 PROCEDURE — 99495 TRANSJ CARE MGMT MOD F2F 14D: CPT | Performed by: FAMILY MEDICINE

## 2021-01-01 PROCEDURE — 2709999900 HC NON-CHARGEABLE SUPPLY

## 2021-01-01 PROCEDURE — 1111F DSCHRG MED/CURRENT MED MERGE: CPT | Performed by: FAMILY MEDICINE

## 2021-01-01 PROCEDURE — B2111ZZ FLUOROSCOPY OF MULTIPLE CORONARY ARTERIES USING LOW OSMOLAR CONTRAST: ICD-10-PCS | Performed by: INTERNAL MEDICINE

## 2021-01-01 PROCEDURE — U0002 COVID-19 LAB TEST NON-CDC: HCPCS

## 2021-01-01 PROCEDURE — C1769 GUIDE WIRE: HCPCS

## 2021-01-01 PROCEDURE — 93005 ELECTROCARDIOGRAM TRACING: CPT | Performed by: PHYSICIAN ASSISTANT

## 2021-01-01 PROCEDURE — 93005 ELECTROCARDIOGRAM TRACING: CPT | Performed by: NURSE PRACTITIONER

## 2021-01-01 PROCEDURE — 36600 WITHDRAWAL OF ARTERIAL BLOOD: CPT

## 2021-01-01 PROCEDURE — B2151ZZ FLUOROSCOPY OF LEFT HEART USING LOW OSMOLAR CONTRAST: ICD-10-PCS | Performed by: INTERNAL MEDICINE

## 2021-01-01 PROCEDURE — 85027 COMPLETE CBC AUTOMATED: CPT

## 2021-01-01 RX ORDER — ALBUTEROL SULFATE 90 UG/1
2 AEROSOL, METERED RESPIRATORY (INHALATION) ONCE
Status: COMPLETED | OUTPATIENT
Start: 2021-01-01 | End: 2021-01-01

## 2021-01-01 RX ORDER — NITROGLYCERIN 0.4 MG/1
TABLET SUBLINGUAL
COMMUNITY
Start: 2020-01-01

## 2021-01-01 RX ORDER — SODIUM CHLORIDE 0.9 % (FLUSH) 0.9 %
10 SYRINGE (ML) INJECTION EVERY 12 HOURS SCHEDULED
Status: DISCONTINUED | OUTPATIENT
Start: 2021-01-01 | End: 2021-01-01 | Stop reason: HOSPADM

## 2021-01-01 RX ORDER — FUROSEMIDE 40 MG/1
40 TABLET ORAL DAILY
Status: DISCONTINUED | OUTPATIENT
Start: 2021-01-01 | End: 2021-01-01 | Stop reason: HOSPADM

## 2021-01-01 RX ORDER — ZOLPIDEM TARTRATE 10 MG/1
TABLET ORAL
COMMUNITY
Start: 2020-01-01

## 2021-01-01 RX ORDER — DIAZEPAM 5 MG/1
5 TABLET ORAL NIGHTLY PRN
Status: DISCONTINUED | OUTPATIENT
Start: 2021-01-01 | End: 2021-01-01 | Stop reason: HOSPADM

## 2021-01-01 RX ORDER — MORPHINE SULFATE 20 MG/ML
0.25 SOLUTION ORAL
COMMUNITY
Start: 2020-01-01

## 2021-01-01 RX ORDER — OXYCODONE HYDROCHLORIDE 5 MG/1
TABLET ORAL
COMMUNITY
Start: 2020-01-01

## 2021-01-01 RX ORDER — COLCHICINE 0.6 MG/1
TABLET ORAL
Qty: 30 TABLET | Refills: 0 | Status: SHIPPED | OUTPATIENT
Start: 2021-01-01

## 2021-01-01 RX ORDER — SODIUM CHLORIDE 0.9 % (FLUSH) 0.9 %
10 SYRINGE (ML) INJECTION PRN
Status: DISCONTINUED | OUTPATIENT
Start: 2021-01-01 | End: 2021-01-01 | Stop reason: HOSPADM

## 2021-01-01 RX ORDER — ATORVASTATIN CALCIUM 20 MG/1
20 TABLET, FILM COATED ORAL NIGHTLY
Status: DISCONTINUED | OUTPATIENT
Start: 2021-01-01 | End: 2021-01-01 | Stop reason: HOSPADM

## 2021-01-01 RX ORDER — SODIUM CHLORIDE 9 MG/ML
INJECTION, SOLUTION INTRAVENOUS CONTINUOUS
Status: DISCONTINUED | OUTPATIENT
Start: 2021-01-01 | End: 2021-01-01

## 2021-01-01 RX ORDER — ACETAMINOPHEN 650 MG/1
650 SUPPOSITORY RECTAL EVERY 6 HOURS PRN
Status: DISCONTINUED | OUTPATIENT
Start: 2021-01-01 | End: 2021-01-01 | Stop reason: HOSPADM

## 2021-01-01 RX ORDER — MORPHINE SULFATE 20 MG/ML
5 SOLUTION ORAL
Status: DISCONTINUED | OUTPATIENT
Start: 2021-01-01 | End: 2021-01-01 | Stop reason: HOSPADM

## 2021-01-01 RX ORDER — NITROGLYCERIN 0.4 MG/1
0.4 TABLET SUBLINGUAL EVERY 5 MIN PRN
Status: DISCONTINUED | OUTPATIENT
Start: 2021-01-01 | End: 2021-01-01 | Stop reason: HOSPADM

## 2021-01-01 RX ORDER — PROMETHAZINE HYDROCHLORIDE 25 MG/1
12.5 TABLET ORAL EVERY 6 HOURS PRN
Status: DISCONTINUED | OUTPATIENT
Start: 2021-01-01 | End: 2021-01-01 | Stop reason: HOSPADM

## 2021-01-01 RX ORDER — POLYETHYLENE GLYCOL 3350 17 G/17G
17 POWDER, FOR SOLUTION ORAL DAILY PRN
Status: DISCONTINUED | OUTPATIENT
Start: 2021-01-01 | End: 2021-01-01 | Stop reason: HOSPADM

## 2021-01-01 RX ORDER — IPRATROPIUM BROMIDE AND ALBUTEROL SULFATE 2.5; .5 MG/3ML; MG/3ML
1 SOLUTION RESPIRATORY (INHALATION) EVERY 4 HOURS PRN
Status: DISCONTINUED | OUTPATIENT
Start: 2021-01-01 | End: 2021-01-01 | Stop reason: HOSPADM

## 2021-01-01 RX ORDER — IPRATROPIUM BROMIDE AND ALBUTEROL SULFATE 2.5; .5 MG/3ML; MG/3ML
1 SOLUTION RESPIRATORY (INHALATION)
Status: DISCONTINUED | OUTPATIENT
Start: 2021-01-01 | End: 2021-01-01

## 2021-01-01 RX ORDER — FUROSEMIDE 10 MG/ML
40 INJECTION INTRAMUSCULAR; INTRAVENOUS ONCE
Status: COMPLETED | OUTPATIENT
Start: 2021-01-01 | End: 2021-01-01

## 2021-01-01 RX ORDER — ATORVASTATIN CALCIUM 10 MG/1
10 TABLET, FILM COATED ORAL DAILY
Qty: 90 TABLET | Refills: 1 | Status: SHIPPED | OUTPATIENT
Start: 2021-01-01

## 2021-01-01 RX ORDER — ACETAMINOPHEN 325 MG/1
650 TABLET ORAL EVERY 6 HOURS PRN
Status: DISCONTINUED | OUTPATIENT
Start: 2021-01-01 | End: 2021-01-01 | Stop reason: HOSPADM

## 2021-01-01 RX ORDER — LANOLIN ALCOHOL/MO/W.PET/CERES
3 CREAM (GRAM) TOPICAL NIGHTLY PRN
Status: DISCONTINUED | OUTPATIENT
Start: 2021-01-01 | End: 2021-01-01 | Stop reason: HOSPADM

## 2021-01-01 RX ORDER — ONDANSETRON 2 MG/ML
4 INJECTION INTRAMUSCULAR; INTRAVENOUS EVERY 6 HOURS PRN
Status: DISCONTINUED | OUTPATIENT
Start: 2021-01-01 | End: 2021-01-01 | Stop reason: HOSPADM

## 2021-01-01 RX ORDER — ESZOPICLONE 2 MG/1
TABLET, FILM COATED ORAL
COMMUNITY
Start: 2021-01-01

## 2021-01-01 RX ADMIN — ALBUTEROL SULFATE 2 PUFF: 90 AEROSOL, METERED RESPIRATORY (INHALATION) at 15:10

## 2021-01-01 RX ADMIN — SACUBITRIL AND VALSARTAN 1 TABLET: 49; 51 TABLET, FILM COATED ORAL at 08:25

## 2021-01-01 RX ADMIN — SACUBITRIL AND VALSARTAN 1 TABLET: 49; 51 TABLET, FILM COATED ORAL at 20:44

## 2021-01-01 RX ADMIN — SACUBITRIL AND VALSARTAN 1 TABLET: 49; 51 TABLET, FILM COATED ORAL at 20:51

## 2021-01-01 RX ADMIN — ATORVASTATIN CALCIUM 20 MG: 20 TABLET, FILM COATED ORAL at 20:45

## 2021-01-01 RX ADMIN — SODIUM CHLORIDE, PRESERVATIVE FREE 10 ML: 5 INJECTION INTRAVENOUS at 20:43

## 2021-01-01 RX ADMIN — Medication 5 MG: at 01:46

## 2021-01-01 RX ADMIN — SODIUM CHLORIDE, PRESERVATIVE FREE 10 ML: 5 INJECTION INTRAVENOUS at 08:32

## 2021-01-01 RX ADMIN — Medication 3 MG: at 01:46

## 2021-01-01 RX ADMIN — SACUBITRIL AND VALSARTAN 1 TABLET: 49; 51 TABLET, FILM COATED ORAL at 08:32

## 2021-01-01 RX ADMIN — SODIUM CHLORIDE, PRESERVATIVE FREE 10 ML: 5 INJECTION INTRAVENOUS at 10:57

## 2021-01-01 RX ADMIN — FUROSEMIDE 40 MG: 40 TABLET ORAL at 10:33

## 2021-01-01 RX ADMIN — APIXABAN 2.5 MG: 2.5 TABLET, FILM COATED ORAL at 09:34

## 2021-01-01 RX ADMIN — SODIUM CHLORIDE, PRESERVATIVE FREE 10 ML: 5 INJECTION INTRAVENOUS at 08:25

## 2021-01-01 RX ADMIN — SACUBITRIL AND VALSARTAN 1 TABLET: 49; 51 TABLET, FILM COATED ORAL at 10:57

## 2021-01-01 RX ADMIN — APIXABAN 2.5 MG: 2.5 TABLET, FILM COATED ORAL at 20:43

## 2021-01-01 RX ADMIN — SODIUM CHLORIDE, PRESERVATIVE FREE 10 ML: 5 INJECTION INTRAVENOUS at 20:46

## 2021-01-01 RX ADMIN — SODIUM CHLORIDE: 9 INJECTION, SOLUTION INTRAVENOUS at 13:04

## 2021-01-01 RX ADMIN — SACUBITRIL AND VALSARTAN 1 TABLET: 49; 51 TABLET, FILM COATED ORAL at 10:33

## 2021-01-01 RX ADMIN — APIXABAN 2.5 MG: 2.5 TABLET, FILM COATED ORAL at 10:32

## 2021-01-01 RX ADMIN — FUROSEMIDE 40 MG: 40 TABLET ORAL at 08:32

## 2021-01-01 RX ADMIN — ATORVASTATIN CALCIUM 20 MG: 20 TABLET, FILM COATED ORAL at 20:43

## 2021-01-01 RX ADMIN — APIXABAN 2.5 MG: 2.5 TABLET, FILM COATED ORAL at 20:44

## 2021-01-01 RX ADMIN — FUROSEMIDE 40 MG: 10 INJECTION, SOLUTION INTRAVENOUS at 08:24

## 2021-01-01 RX ADMIN — SACUBITRIL AND VALSARTAN 1 TABLET: 49; 51 TABLET, FILM COATED ORAL at 20:03

## 2021-01-01 RX ADMIN — Medication 3 MG: at 20:45

## 2021-01-01 RX ADMIN — SODIUM CHLORIDE, PRESERVATIVE FREE 10 ML: 5 INJECTION INTRAVENOUS at 21:20

## 2021-01-01 RX ADMIN — ATORVASTATIN CALCIUM 20 MG: 20 TABLET, FILM COATED ORAL at 22:15

## 2021-01-01 RX ADMIN — APIXABAN 2.5 MG: 2.5 TABLET, FILM COATED ORAL at 08:32

## 2021-01-01 RX ADMIN — ATORVASTATIN CALCIUM 20 MG: 20 TABLET, FILM COATED ORAL at 20:03

## 2021-01-01 RX ADMIN — Medication 5 MG: at 10:33

## 2021-01-01 RX ADMIN — SODIUM CHLORIDE, PRESERVATIVE FREE 10 ML: 5 INJECTION INTRAVENOUS at 10:33

## 2021-01-01 RX ADMIN — SACUBITRIL AND VALSARTAN 1 TABLET: 49; 51 TABLET, FILM COATED ORAL at 21:20

## 2021-01-01 RX ADMIN — DIAZEPAM 5 MG: 5 TABLET ORAL at 20:03

## 2021-01-01 RX ADMIN — Medication 3 MG: at 21:19

## 2021-01-01 RX ADMIN — SODIUM CHLORIDE, PRESERVATIVE FREE 10 ML: 5 INJECTION INTRAVENOUS at 09:35

## 2021-01-01 RX ADMIN — APIXABAN 2.5 MG: 2.5 TABLET, FILM COATED ORAL at 21:19

## 2021-01-01 RX ADMIN — DIAZEPAM 5 MG: 5 TABLET ORAL at 20:43

## 2021-01-01 RX ADMIN — SODIUM CHLORIDE, PRESERVATIVE FREE 10 ML: 5 INJECTION INTRAVENOUS at 22:16

## 2021-01-01 RX ADMIN — FUROSEMIDE 40 MG: 40 TABLET ORAL at 08:24

## 2021-01-01 RX ADMIN — FUROSEMIDE 40 MG: 40 TABLET ORAL at 10:57

## 2021-01-01 RX ADMIN — NITROGLYCERIN 0.5 INCH: 20 OINTMENT TOPICAL at 17:11

## 2021-01-01 RX ADMIN — SACUBITRIL AND VALSARTAN 1 TABLET: 49; 51 TABLET, FILM COATED ORAL at 22:15

## 2021-01-01 RX ADMIN — FUROSEMIDE 40 MG: 40 TABLET ORAL at 09:34

## 2021-01-01 RX ADMIN — APIXABAN 2.5 MG: 2.5 TABLET, FILM COATED ORAL at 10:57

## 2021-01-01 RX ADMIN — PERFLUTREN 2.2 MG: 6.52 INJECTION, SUSPENSION INTRAVENOUS at 09:59

## 2021-01-01 RX ADMIN — ATORVASTATIN CALCIUM 20 MG: 20 TABLET, FILM COATED ORAL at 21:20

## 2021-01-01 RX ADMIN — APIXABAN 2.5 MG: 2.5 TABLET, FILM COATED ORAL at 22:15

## 2021-01-01 RX ADMIN — SODIUM CHLORIDE, PRESERVATIVE FREE 10 ML: 5 INJECTION INTRAVENOUS at 10:58

## 2021-01-01 RX ADMIN — SACUBITRIL AND VALSARTAN 1 TABLET: 49; 51 TABLET, FILM COATED ORAL at 11:29

## 2021-01-01 SDOH — ECONOMIC STABILITY: TRANSPORTATION INSECURITY
IN THE PAST 12 MONTHS, HAS THE LACK OF TRANSPORTATION KEPT YOU FROM MEDICAL APPOINTMENTS OR FROM GETTING MEDICATIONS?: NOT ASKED

## 2021-01-01 SDOH — ECONOMIC STABILITY: FOOD INSECURITY: WITHIN THE PAST 12 MONTHS, THE FOOD YOU BOUGHT JUST DIDN'T LAST AND YOU DIDN'T HAVE MONEY TO GET MORE.: NOT ASKED

## 2021-01-01 SDOH — ECONOMIC STABILITY: INCOME INSECURITY: HOW HARD IS IT FOR YOU TO PAY FOR THE VERY BASICS LIKE FOOD, HOUSING, MEDICAL CARE, AND HEATING?: NOT ASKED

## 2021-01-01 SDOH — ECONOMIC STABILITY: TRANSPORTATION INSECURITY
IN THE PAST 12 MONTHS, HAS LACK OF TRANSPORTATION KEPT YOU FROM MEETINGS, WORK, OR FROM GETTING THINGS NEEDED FOR DAILY LIVING?: NOT ASKED

## 2021-01-01 SDOH — ECONOMIC STABILITY: FOOD INSECURITY: WITHIN THE PAST 12 MONTHS, YOU WORRIED THAT YOUR FOOD WOULD RUN OUT BEFORE YOU GOT MONEY TO BUY MORE.: NOT ASKED

## 2021-01-01 ASSESSMENT — PAIN DESCRIPTION - ONSET
ONSET: ON-GOING
ONSET: ON-GOING

## 2021-01-01 ASSESSMENT — PAIN DESCRIPTION - LOCATION
LOCATION: CHEST
LOCATION: LEG;FOOT

## 2021-01-01 ASSESSMENT — PAIN DESCRIPTION - PROGRESSION
CLINICAL_PROGRESSION: GRADUALLY IMPROVING
CLINICAL_PROGRESSION: NOT CHANGED
CLINICAL_PROGRESSION: GRADUALLY WORSENING

## 2021-01-01 ASSESSMENT — PAIN DESCRIPTION - PAIN TYPE
TYPE: ACUTE PAIN

## 2021-01-01 ASSESSMENT — PAIN SCALES - GENERAL
PAINLEVEL_OUTOF10: 0

## 2021-01-01 ASSESSMENT — PAIN DESCRIPTION - DESCRIPTORS
DESCRIPTORS: PRESSURE
DESCRIPTORS: BURNING

## 2021-01-01 ASSESSMENT — PAIN DESCRIPTION - ORIENTATION: ORIENTATION: MID

## 2021-01-01 ASSESSMENT — PAIN DESCRIPTION - FREQUENCY
FREQUENCY: CONTINUOUS
FREQUENCY: CONTINUOUS

## 2021-01-15 NOTE — TELEPHONE ENCOUNTER
Patient's son stated patient is on hospice and he will contact hospice about getting the patient the COVID vaccine.

## 2021-01-20 NOTE — H&P
LYMPH-No palpable cervical lymphadenopathy and no hepatosplenomegaly. No petechiae or ecchymoses. MS -No gross joint deformities. SKIN -Normal coloration, warm, dry. NEURO-Cranial nerves appear grossly intact, normal speech, no lateralizing weakness. PSYC-Awake, alert, oriented x 3- person, place, time, situation, anxious affect. Past Medical History:      Past Medical History:   Diagnosis Date    Additional heart attack (anterior wall) 8/98    CAD (coronary artery disease)     CHF (congestive heart failure) ([facility])     Heart imaging 6/26/14    MUGA: Abnormal study, EF 39% global hypokinesis of LV.  History of cardiovascular stress test 6/4/2014 2003 6/14 EF 34% suggest prior ant and apical MI, LV function severely reduced 2003EF=36%,perf. diminished to anteroapical segment w/minimal redistribution: sugg. of prior MI.    History of echocardiogram 6/12/14 6/14EF50-55% Mild TR, right ventricular systolic pressure is elevated at 30-40mm Hg.  History of left heart catheterization 4/29/2016    LAD 50% in stent re-stenosis, CX/RCA/LM no significant diaease. 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)     Shingles        Past Surgical  History:    has a past surgical history that includes Total knee arthroplasty (9958-1143); Percutaneous Transluminal Coronary Angio (8/13/98); and Cardiac defibrillator placement (Left, 03/20/2017). Social History:    FAM HX: Reviewed  family history includes Cancer in his brother; Heart Attack in his brother, father, and mother.     Soc HX:   Social History     Socioeconomic History    Marital status:      Spouse name: None    Number of children: None    Years of education: None    Highest education level: None   Occupational History    Occupation: retired   Social Needs    Financial resource strain: None    Food insecurity Worry: None     Inability: None    Transportation needs     Medical: None     Non-medical: None   Tobacco Use    Smoking status: Former Smoker    Smokeless tobacco: Never Used    Tobacco comment: reviewed 5/9/2016. Quit many years ago   Substance and Sexual Activity    Alcohol use: No     Alcohol/week: 0.0 standard drinks    Drug use: No    Sexual activity: Not Currently     Partners: Female     Comment:    Lifestyle    Physical activity     Days per week: None     Minutes per session: None    Stress: None   Relationships    Social connections     Talks on phone: None     Gets together: None     Attends Scientologist service: None     Active member of club or organization: None     Attends meetings of clubs or organizations: None     Relationship status: None    Intimate partner violence     Fear of current or ex partner: None     Emotionally abused: None     Physically abused: None     Forced sexual activity: None   Other Topics Concern    None   Social History Narrative    None     TOBACCO:   reports that he has quit smoking. He has never used smokeless tobacco.  ETOH:   reports no history of alcohol use. Drugs:  reports no history of drug use. 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 Authorizing Provider   Morphine Sulfate (MORPHINE 20MG/ML) SOLN concentrated solution Place 0.25 mLs under the tongue every 3 hours as needed.  12/28/20   Historical Provider, MD   apixaban (ELIQUIS) 2.5 MG TABS tablet Take 1 tablet by mouth 2 times daily 12/30/20   Connor Apley, MD   sacubitril-valsartan [facility]) 49-51 MG per tablet Take 1 tablet by mouth 2 times daily 12/30/20   Connor Apley, MD furosemide (LASIX) 40 MG tablet Take 1 tablet by mouth 2 times daily  Patient taking differently: Take 40 mg by mouth daily  3/16/20   Linda Jaimes MD   nitroGLYCERIN (NITROLINGUAL) 0.4 MG/SPRAY spray Place 1 spray under the tongue every 5 minutes as needed for Chest pain    Historical Provider, MD         Medications:   Medications:    nitroglycerin  0.5 inch Topical Once      Infusions:   PRN Meds:      Data:     Laboratory this visit:  Reviewed  Recent Labs     01/20/21  1235   WBC 8.3   HGB 14.9   HCT 48.7         Recent Labs     01/20/21  1235      K 4.5      CO2 25   BUN 28*   CREATININE 2.5*     Recent Labs     01/20/21  1235   AST 15   ALT 11   BILITOT 1.8*   ALKPHOS 69     No results for input(s): INR in the last 72 hours. Radiology this visit:  Reviewed. Xr Chest Portable    Result Date: 1/20/2021  EXAMINATION: ONE XRAY VIEW OF THE CHEST 1/20/2021 12:34 pm COMPARISON: 1/18/2020 HISTORY: ORDERING SYSTEM PROVIDED HISTORY: chest pain TECHNOLOGIST PROVIDED HISTORY: Reason for exam:->chest pain Reason for Exam: chest pain FINDINGS: AICD redemonstrated overlying the left hemithorax. Stable mild chronic interstitial changes. No focal consolidations, pleural effusions or pulmonary edema. No pneumothoraces. Cardiac and mediastinal silhouettes are stable with stable cardiomegaly. No acute bony abnormality. Stable exam without evidence for acute cardiopulmonary process. Stable cardiomegaly.        EKG this visit:  Reviewed       Electronically signed by Dorthey Soulier, APRN - CNP on 1/20/2021 at 3:37 PM

## 2021-01-20 NOTE — CARE COORDINATION
LSW notified by Lauren Zhu that pt's son called- Janna Weiner [phone]. He stated he is POA & wanted to visit w/pt and noted pt is active W/[facility] [phone]. Tom Brown explained CM will follow-up w/him. LSW completed chart review and confirmed there is POA scanned into chart naming Cinda Ying as pt's agent. Pt is being admitted for CP. He has Unstable angina & Chronic combined systolic/diastolic heart failure. Initial troponin 0.023 (chronically elevated but above his baseline). LSW spoke to Jermyn about above information. She noted she attempted to reach Cinda Ying but he did not answer her call. LSW to try to reach him so she can review POC. She reported pt does want to be admitted and receive care but he did not mention anything about hospice. LSW was able to call Cinda Ying and explained POC. Then let him speak to [place]. After speaking to him, she hands LSW phone back and notes pt still to be admitted and Hospice to be called to d/c services. LSW then spoke to Cinda Ying and he noted pt on hospice for CHF. He wants pt to receive care and address any new issues. LSW explains hospice can be restarted upon d/c. Cinda Ying notes pt will not be able to do stress test on tred mill but again wants all that can be done for pt to keep him alive. LSW then took phone to room and informed pt of POC. LSW then let pt speak to Cinda Ying. After pt done speaking to Cinda Ying he hands LSW phone. Cinda Ying asks to be called w/pt's room # once assigned and then call terminated. Pt confirms to LSW he wants to be a full code. Pt states his son is a good man and he trust him. Pt reports his wife  7 months ago and he has been staying w/his son. Pt notes he was never in the [employer]. Pt states he would like to stay as healthy as possible. [address] and spoke to [facility].. Informed her of pt's admission and she explained she will have someone on pt's team call LSW back.      [facility] MARLEEW called [facility] and spoke to Daljit Kohli. She apologized there was no return call. She noted the LSW is on her way to hospital to have pt sign revocation. CHERY aprised Jose of POC. She will let RN know on floor as pt admitted to [id number]. MARLEEW called pt's son and apprised him of POC. He noted to please call him via his cell [phone].

## 2021-01-20 NOTE — ACP (ADVANCE CARE PLANNING)
Advance Care Planning     Advance Care Planning Activator (Inpatient)  Conversation Note      Date of ACP Conversation: 1/20/2021    Conversation Conducted with: Patient with Decision Making Capacity    ACP Activator: Leonela Murillo    *When Decision Maker makes decisions on behalf of the incapacitated patient: Decision Maker is asked to consider and make decisions based on patient values, known preferences, or best interests. Health Care Decision Maker:     Current Designated Health Care Decision Maker:   Primary Decision Maker: Prema Abarca [facility] - [phone]  (If there is a [address] named in the [facility] Makers\" box in the ACP activity, but it is not visible above, be sure to open that field and then select the health care decision maker relationship (ie \"primary\") in the blank space to the right of the name.) Validate  this information as still accurate & up-to-date; edit too[address] field as needed.)    Note: Assess and validate information in current ACP documents, as indicated. Care Preferences    Ventilation: \"If you were in your present state of health and suddenly became very ill and were unable to breathe on your own, what would your preference be about the use of a ventilator (breathing machine) if it were available to you? \"      Would the patient desire the use of ventilator (breathing machine)?: yes    \"If your health worsens and it becomes clear that your chance of recovery is unlikely, what would your preference be about the use of a ventilator (breathing machine) if it were available to you? \"     Would the patient desire the use of ventilator (breathing machine)?: Yes      Resuscitation \"CPR works best to restart the heart when there is a sudden event, like a heart attack, in someone who is otherwise healthy. Unfortunately, CPR does not typically restart the heart for people who have serious health conditions or who are very sick. \"    \"In the event your heart stopped as a result of an underlying serious health condition, would you want attempts to be made to restart your heart (answer \"yes\" for attempt to resuscitate) or would you prefer a natural death (answer \"no\" for do not attempt to resuscitate)? \" yes      NOTE: If the patient has a valid advance directive AND now provides care preference(s) that are inconsistent with that prior directive, advise the patient to consider either: creating a new advance directive that complies with state-specific requirements; or, if that is not possible, orally revoking that prior directive in accordance with state-specific requirements, which must be documented in the EHR. [] Yes   [] No   Educated Patient / Mitra Jimenez regarding differences between Advance Directives and portable DNR orders.     Length of ACP Conversation in minutes:  12    Conversation Outcomes:  [x] ACP discussion completed  [] Existing advance directive reviewed with patient; no changes to patient's previously recorded wishes  [] New Advance Directive completed  [] Portable Do Not Rescitate prepared for Provider review and signature  [] POLST/POST/MOLST/MOST prepared for Provider review and signature      Follow-up plan:    [] Schedule follow-up conversation to continue planning  [] Referred individual to Provider for additional questions/concerns   [] Advised patient/agent/surrogate to review completed ACP document and update if needed with changes in condition, patient preferences or care setting    [x] This note routed to one or more involved healthcare providers Pt was receiving hospice services and had a DNR. However, d/t this admission, he wants to be a full code so he can receive treatment/care needed. Hospice to come to hospital to have revocation signed and then likely to be reinitiated @ d/c; and he will then again be a DNR again.

## 2021-01-20 NOTE — PROGRESS NOTES
Medication History  [facility]    Patient Name: Zunilda May 7/31/1933     Medication history has been completed by: Suzy Pacheco CPhT    Source(s) of information: insurance claims and retail pharmacy     Primary Care Physician: Nando Carballo MD     Pharmacy: [address] [place] [address] [place]    Allergies as of 01/20/2021 - Review Complete 01/20/2021   Allergen Reaction Noted    Pcn [penicillins] Hives 09/22/2012    Nsaids  11/13/2018    Plavix [clopidogrel]  06/14/2017        Prior to Admission medications    Medication Sig Start Date End Date Taking? Authorizing Provider   Morphine Sulfate (MORPHINE 20MG/ML) SOLN concentrated solution Place 0.25 mLs under the tongue every 3 hours as needed.  12/28/20   Historical Provider, MD   apixaban (ELIQUIS) 2.5 MG TABS tablet Take 1 tablet by mouth 2 times daily 12/30/20   Oleksandr Bennett MD   sacubitril-valsartan [facility]) 49-51 MG per tablet Take 1 tablet by mouth 2 times daily 12/30/20   Oleksandr Bennett MD   carvedilol (COREG) 6.25 MG tablet Take 6.25 mg by mouth 2 times daily (with meals)    Historical Provider, MD   furosemide (LASIX) 40 MG tablet Take 1 tablet by mouth 2 times daily  Patient taking differently: Take 40 mg by mouth daily  3/16/20   Washington Vaughn MD   potassium chloride (KLOR-CON M) 20 MEQ extended release tablet Take 1 tablet by mouth daily 1/19/20   Chino Houser MD   baclofen (LIORESAL) 10 MG tablet Take 10 mg by mouth 3 times daily    Historical Provider, MD   midodrine (PROAMATINE) 5 MG tablet Take 5 mg by mouth 3 times daily    Historical Provider, MD   metolazone (ZAROXOLYN) 5 MG tablet Take 0.5 tablets by mouth daily as needed (chf symptoms, take 30 min prior to 1 lasix dose) 12/2/18   Joshua Mac MD   nitroGLYCERIN (NITROLINGUAL) 0.4 MG/SPRAY spray Place 1 spray under the tongue every 5 minutes as needed for Chest pain    Historical Provider, MD atorvastatin (LIPITOR) 80 MG tablet Take 1 tablet by mouth nightly  Patient not taking: Reported on 3/16/2020 7/13/15   Angela Haddad MD     Medications added or changed (ex. new medication, dosage change, interval change, formulation change): Morphine oral soln (added)    Medications removed from list (include reason, ex. noncompliance, medication cost, therapy complete etc.):   Atorvastatin last fill date 2017 flagged for removal  Baclofen  last fill date 11/2018 flagged for removal  Carvedilol  last fill date 09/2019 flagged for removal  Metolazone no history of patient being on this med flagged for removal  Midodrine  last fill date 10/2018 flagged for removal  Potassium chloride  last fill date 01/2020 flagged for removal    Comments:  Unable to assess patient. When speaking with him his state was altered. Reached out to retail pharmacy for verification of last fill dates. Per history as stated above, patient has not filled these medications for quite sometime. Patient does ask for samples of Eliquis and Entresto.   Per pharmacy recent fill of morphine oral soln    To my knowledge the above medication history is accurate as of 1/20/2021 2:15 PM.   Sapna Miguel CPhT   1/20/2021 2:15 PM

## 2021-01-20 NOTE — ED PROVIDER NOTES
EMERGENCY DEPARTMENT [facility] EMERGENCY DEPARTMENT        TRIAGE CHIEF COMPLAINT:   Chest Pain      [place]:  Hannah Paez is a 80 y.o. male that presents via EMS from home for chest pain and shortness of breath. Onset was just prior to ED arrival.  Context is patient states that he was sitting upright in chair when he began feeling very short of breath and started having left-sided chest pressure. Did take 2 nitros which improved his shortness of breath, was given 325 mg aspirin on route by EMS. Patient does state he has a history of coronary artery disease, hyperlipidemia, CHF, A. Fib, chronic kidney disease, TIA. Does have an ICD. Does take Eliquis as well as Entresto. Has been compliant with his Lasix and does state that his legs does not appear more edematous than baseline. No dizziness lightheadedness or syncope. No nausea or vomiting or diaphoresis. Patient does state that she lives with his son, Shadi Epps who is power of  who I did try to call but was not able to reach. No known COVID-19 positive contacts. No fever chills, cough, mopped assist, abdominal or urinary complaints. States pain at this time is a 2/10 heavy pressure. Denies any pain rating to the back, no tearing or ripping sensation. Patient does state that he utilizes as needed nasal cannula, 3 to 4 L.  Follows with Dr. Anushka Orozco    ROS:  General:  No fevers, no chills, no weakness  Cardiovascular:  See HPI    Respiratory:  See HPI  Gastrointestinal: See HPI  Musculoskeletal:  No muscle pain, no joint pain  Skin:  No rash, no pruritis, no easy bruising  Neurologic:  No speech problems, no headache, no extremity numbness, no extremity tingling, no extremity weakness  Psychiatric:  No anxiety  Genitourinary:  No dysuria, no hematuria  Extremities:  No edema    Past Medical History:   Diagnosis Date    Additional heart attack (anterior wall) 8/98 Comment:    Lifestyle    Physical activity     Days per week: Not on file     Minutes per session: Not on file    Stress: Not on file   Relationships    Social connections     Talks on phone: Not on file     Gets together: Not on file     Attends Gnosticism service: Not on file     Active member of club or organization: Not on file     Attends meetings of clubs or organizations: Not on file     Relationship status: Not on file    Intimate partner violence     Fear of current or ex partner: Not on file     Emotionally abused: Not on file     Physically abused: Not on file     Forced sexual activity: Not on file   Other Topics Concern    Not on file   Social History Narrative    Not on file     Current Facility-Administered Medications   Medication Dose Route Frequency Provider Last Rate Last Admin    nitroglycerin (NITRO-BID) 2 % ointment 0.5 inch  0.5 inch Topical Once Gab Hall PA-C         Current Outpatient Medications   Medication Sig Dispense Refill    Morphine Sulfate (MORPHINE 20MG/ML) SOLN concentrated solution Place 0.25 mLs under the tongue every 3 hours as needed.      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 Head:  Normocephalic, Atraumatic  Eyes:  EOMI. Sclera clear. Conjunctiva normal, No discharge. Neck/Lymphatics: Supple, no JVD, No lymphadenopathy  Cardiovascular:  RRR, Normal S1 & S2     Peripheral Vascular: Distal pulses 2+, Capillary refill <2seconds  Respiratory:  Respirations nonnlabored, speaking full sentences out difficulty. 94% on home 3 L. Lungs with equal but coarse air exchange, scattered expiratory wheezing and rhonchi. No retractions or accessory muscle use. No rales. Abdomen: Bowel sounds normal in all quadrants, Soft, Non tender/Nondistended, No palpable abdominal masses. Musculoskeletal: No edema, No tenderness, No cyanosis, 5/5 strength bilaterally, BUE/BLE symmetrical without atrophy or deformities  Integument:  Warm, Dry, Intact, Skin turgor and texture normal  Neurologic:  Alert & oriented x3 , No focal deficits noted. Cranial nerves II through XII grossly intact. No slurred speech. No facial droop. Normal gross motor coordination & motor strength bilateral upper and lower extremities.     Psychiatric:  Affect appropriate      I have reviewed and interpreted all of the currently available lab results from this visit (if applicable):  Results for orders placed or performed during the hospital encounter of 01/20/21   CBC Auto Differential   Result Value Ref Range    WBC 8.3 4.0 - 10.5 K/CU MM    RBC 5.03 4.6 - 6.2 M/CU MM    Hemoglobin 14.9 13.5 - 18.0 GM/DL    Hematocrit 48.7 42 - 52 %    MCV 96.8 78 - 100 FL    MCH 29.6 27 - 31 PG    MCHC 30.6 (L) 32.0 - 36.0 %    RDW 14.9 11.7 - 14.9 %    Platelets 821 045 - 612 K/CU MM    MPV 9.6 7.5 - 11.1 FL    Differential Type AUTOMATED DIFFERENTIAL     Segs Relative 81.3 (H) 36 - 66 % Stable exam without evidence for acute cardiopulmonary process. Stable cardiomegaly.               XR CHEST PORTABLE (Final result)  Result time 01/20/21 13:52:01  Final result by Rosio Oconnor MD (01/20/21 13:52:01)                Impression:    Stable exam without evidence for acute cardiopulmonary process. Stable cardiomegaly. Narrative:    EXAMINATION:   ONE XRAY VIEW OF THE CHEST     1/20/2021 12:34 pm     COMPARISON:   1/18/2020     HISTORY:   ORDERING SYSTEM PROVIDED HISTORY: chest pain   TECHNOLOGIST PROVIDED HISTORY:   Reason for exam:->chest pain   Reason for Exam: chest pain     FINDINGS:   AICD redemonstrated overlying the left hemithorax. Stable mild chronic interstitial changes.  No focal consolidations, pleural   effusions or pulmonary edema.  No pneumothoraces.  Cardiac and mediastinal   silhouettes are stable with stable cardiomegaly.  No acute bony abnormality. EKG Interpretation  Please see ED physician's note - Dr. Jamia Chou - for EKG interpretation        Chart review shows recent radiographs:  No results found. ED COURSE & MEDICAL DECISION MAKING       Vital signs and nursing notes reviewed during ED course. I have independently evaluated this patient . Supervising physician - Dr. Jamia Chou - was present in ED and available for consultation throughout entirety of patient's care. All pertinent Lab data and radiographic results reviewed with patient at bedside. The patient and/or the family were informed of the results of any tests/labs/imaging, the treatment plan, and time was allotted to answer questions. Clinical Impression:  1. Chest pain, unspecified type    2. Shortness of breath    3. Elevated brain natriuretic peptide (BNP) level    4.  Elevated troponin Patient presents via EMS from home for chest pain and shortness of breath. Did take 2 nitro as well as full dose aspirin prior to arrival and is having improvement of chest pain. On exam, nontoxic 40-year-old male, vitals are stable and nonlabored breathing. On arrival cardiac exam.  Lungs with coarse air exchange and scattered expiratory wheezing and rhonchi, no rales. Abdomen soft nontender. No significant peripheral pitting edema. Patient is placed on to telemetry and continuous pulse ox monitoring. Did offer pain control which patient declined. CBC, CMP without significant derangement. BUN and creatinine 28/2.5 which is stable from baseline. Troponin has up trended to 0.023 but last comparative labs was from a year ago. BNP is significant elevated at 58,803 however patient does not appear fluid overloaded on exam and chest x-ray shows no evidence of acute cardiopulmonary process or vascular congestion. Given patient's age, history cardiac risk factors, do feel admission is warranted for ACS/chest pain rule out. On chart review, last echocardiogram in 2018 does show an abnormal LV function with an EF of 20%. Unclear exact etiology of patient's elevated BNP, vitals otherwise stable and patient is on Eliquis for local suspicion for an acute PE but patient would not be able to have contrast imaging at this time given his renal function. On reassessment, patient is having some return of his shortness of breath, continues to be 96% on home 3 L. No new rales or evidence of fluid overload on reassessment exam.  Was given additional nitro paste and plan to admit for further evaluation and care for ACS/chest pain rule out which patient is agreeable with. I did consult with Esther Boyer NP - hospitalist - and discussed patient's history, ED presentation/course including any pertinent laboratory findings and imaging study findings. He/she agrees to hospital admission. Patient is admitted to the hospital in stable condition. In consideration of current COVID19 pandemic, with effort to minimize unnecessary provider exposure, this patient was seen at bedside by me independently. However, in compliance with current hospital BOSSMAN/ED protocol, prior to admission I did discuss this patient case with emergency department physician, Dr. Evan Patino, who did agree with ED workup/evaluation and plan for admission. Of note, this Pt was NOT admitted to the ICU. In light of current events, I did utilize appropriate PPE (including N95 face mask, safety glasses, gloves as recommended by the health facility/national standard best practice, during my bedside interactions with the patient. Patient was masked throughout ED course. Full droplet precaution as well as full PPE was followed throughout patient's ED course and evaluation. Diagnosis and plan discussed in detail with patient who understands and agrees. Disposition referral (if applicable):  No follow-up provider specified.     Disposition medications (if applicable):  New Prescriptions    No medications on file         (Please note that portions of this note may have been completed with a voice recognition program. Efforts were made to edit the dictations but occasionally words are mis-transcribed.) 1630: Case management, Juliet Baxter, did come to speak with me following admission call. Does appear that patient is under care of [facility] [place]. We were finally able to get a hold of patient's son/power of  who does state patient is on hospice for chronic chest pain as well as CHF, is on morphine as well as scheduled nitro. I discussed with POA patient's work-up here and plan for admission. A chemical stress test has already been ordered by cardiology. Son is completely agreeable with plan for admission, is requesting patient be taken off of hospice to have full admission work-up and that patient is full code. This was witnessed by case management at bedside and patient also would like to be full code and taken off of hospice care.   I did call and update hospitalist with this information    Mars Stovall PA-C  01/20/21 SALOME Mata PA-C  01/20/21 1009

## 2021-01-20 NOTE — CONSULTS
CHART REVIEWED  CONSULT RECEIVED  FUL NOTE TO FOLLOW                      Name:  Jose Castaneda /Age/Sex: 1933  (80 y.o. male)   MRN & CSN:  [id number] & [id number] Admission Date/Time: 2021 11:50 AM   Location:  ED18/ED-18 PCP: Mariza Berumen, [address] [facility] Riley Guevara Day: 1          Referring physician:  Lucia Crews MD         Reason for consultation:  cp        Thanks for referral.    Information source: patient    CC;  cp      HPI:   Thank you for involving me in taking  care of Jose Castaneda who  is a 80 y. o.year  Old male  Presents with  H/o cad, htn, hyperlipoidimea, a fib , icd now presented with lt lower moderate nonexertional nonradiating CP with mild SOB   For  Afew days,    His BNP level is markedly recorded. Past medical history:    has a past medical history of Additional heart attack (anterior wall), CAD (coronary artery disease), CHF (congestive heart failure) ([address] Heart imaging, History of cardiovascular stress test, History of echocardiogram, History of left heart catheterization, Hyperlipidemia, Hypertension, ICD (implantable cardioverter-defibrillator), biventricular, in situ, PAF (paroxysmal atrial fibrillation) ([address] and Shingles. Past surgical history:   has a past surgical history that includes Total knee arthroplasty (1809-3561); Percutaneous Transluminal Coronary Angio (98); and Cardiac defibrillator placement (Left, 2017). Social History:   reports that he has quit smoking. He has never used smokeless tobacco. He reports that he does not drink alcohol or use drugs. Family history:  family history includes Cancer in his brother; Heart Attack in his brother, father, and mother.     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 Psychiatric:normal mood, no anxiety    Lab Review   Recent Labs     01/20/21  1235   WBC 8.3   HGB 14.9   HCT 48.7         Recent Labs     01/20/21  1235      K 4.5      CO2 25   BUN 28*   CREATININE 2.5*     Recent Labs     01/20/21  1235   AST 15   ALT 11   BILITOT 1.8*   ALKPHOS 69     No results for input(s): TROPONINI in the last 72 hours.   No results found for: BNP  Lab Results   Component Value Date    INR 1.63 11/11/2018    PROTIME 18.5 (H) 11/11/2018           Assessment/Recommendations:     - CAD/ CP; serial trops, ekgs  - HFrEf check echo  - HTN stable  - Hyperlipidimea on statin  - a fib  HR controlled on anticoag  - ICD   Has Bi v         Sylvester Finn MD, 1/20/2021 4:54 PM

## 2021-01-21 NOTE — CARE COORDINATION
Patient had family call wanting to get information regarding patient. Family reported that patient is no longer in room 3018. CM explained that they had reached the ED and would need to call floor 3 where patient was located. CM gave family  from [employer] number, nurses station phone number for floor 3 and main number. Family reported that they would follow up.

## 2021-01-21 NOTE — PROGRESS NOTES
Cardiology Progress Note     Today's Plan for left heart cath today   echo  Admit Date:  1/20/2021    Consult reason/ Seen today for: chest jpain    Subjective and  Overnight Events:  Continues to have chest pain. Describes it as a heaviness across the anterior chest. He states he feels fatigued and short of breath. telemetry - A- V paced rhythm with capture     Assessment / Plan / Recommendation:     1. Chest pain: elevated troponin-concern for ACS-has known CAD with PCI of LAD and Diag. Will proceed with C- ? intolerance to Plavix ( blood in stool)  2. 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 Daily weights, strict Is and O's -   3. Severe left ventricular systolic dysfunction -has BI V ICD in place now in decompensated heart failure. Continue with [employer] will plan for optimization  Medications  4. Paroxysmal afib: rate is controlled. On eliquis on anticoagulation   5. Dyslipidemia: continue statins  6. CKD - per primary           History of Presenting Illness:    Chief complain on admission : 80 y. o.year old who is admitted for  Chief Complaint   Patient presents with    Chest Pain        Past medical history:    has a past medical history of Additional heart attack (anterior wall), CAD (coronary artery disease), CHF (congestive heart failure) ([address] Heart imaging, History of cardiovascular stress test, History of echocardiogram, History of left heart catheterization, Hyperlipidemia, Hypertension, ICD (implantable cardioverter-defibrillator), biventricular, in situ, PAF (paroxysmal atrial fibrillation) ([address] and Shingles.   Past surgical history: has a past surgical history that includes Total knee arthroplasty (9672-8024); Percutaneous Transluminal Coronary Angio (8/13/98); and Cardiac defibrillator placement (Left, 03/20/2017). Social History:   reports that he has quit smoking. He has never used smokeless tobacco. He reports that he does not drink alcohol or use drugs. Family history:  family history includes Cancer in his brother; Heart Attack in his brother, father, and mother. 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   Neck- trachea is midline  Eyes:  PEERL, Conjunctiva normal  Respiratory:  decreased breath sounds, No respiratory distress, No wheezing, No chest tenderness. Cardiovascular:  Normal heart rate, Normal rhythm, systolic murmur appreciated, No rubs appreciated, No gallops appreciated, JVP not elevated  Abdomen/GI:  Soft, No tenderness  Musculoskeletal:  Intact distal pulses, no edema to lower legs,  No tenderness, No cyanosis, No clubbing. Integument:  Warm, Dry  Lymphatic:  No lymphadenopathy noted.    Neurologic:  Alert & oriented  Psychiatric:  Affect and Mood :pleasant     Medications:    furosemide  40 mg Intravenous Once    ipratropium-albuterol  1 ampule Inhalation Q4H WA    [START ON 1/22/2021] apixaban  2.5 mg Oral BID    sodium chloride flush  10 mL Intravenous 2 times per day  atorvastatin  20 mg Oral Nightly    sacubitril-valsartan  1 tablet Oral BID    furosemide  40 mg Oral Daily       sodium chloride flush, promethazine **OR** ondansetron, acetaminophen **OR** acetaminophen, polyethylene glycol, nitroGLYCERIN    Lab Data:  CBC:   Recent Labs     01/20/21  1235 01/21/21 0226   WBC 8.3 8.6   HGB 14.9 14.4   HCT 48.7 46.8   MCV 96.8 97.5    177     BMP:   Recent Labs     01/20/21  1235 01/21/21 0226    137   K 4.5 4.3    102   CO2 25 23   BUN 28* 31*   CREATININE 2.5* 2.6*     PT/INR: No results for input(s): PROTIME, INR in the last 72 hours. BNP:    Recent Labs     01/20/21 1235   PROBNP 58,803*     TROPONIN:   Recent Labs     01/20/21  1235 01/20/21 2045 01/21/21 0226   TROPONINT 0.023* 0.017* 0.024*        ECHO :       NM Myocardial Spect:       Impression:  Active Problems:    Unstable angina (HCC)    Chest pain  Resolved Problems:    * No resolved hospital problems. *       All labs, medications and tests reviewed by myself, continue all other medications of all above medical condition listed as is except for changes mentioned above. Thank you   Please call with questions. Electronically signed by RUBA Rodgers CNP on 1/21/2021 at 8:18 AM  I have seen ,spoken to  and examined this patient personally, independently of the nurse practitioner. I have reviewed the hospital care given to date and reviewed all pertinent labs and imaging. The plan was developed mutually at the time of the visit with the patient,  NP  and myself. I have spoken with patient, nursing staff and provided written and verbal instructions . The above note has been reviewed and I agree with the assessment, diagnosis, and treatment plan with changes made by me as follows     CARDIOLOGY ATTENDING ADDENDUM    HPI:  I have reviewed the above HPI  And agree with above   Tahira Lozano is a 80 y. o.year old who and presents with had concerns including Chest Pain.   Chief Complaint Patient presents with    Chest Pain     Interval history:  HAD CP    Physical Exam:  General:  Awake, alert, NAD  Head:normal  Eye:normal  Neck:  No JVD   Chest:  Clear to auscultation, respiration easy  Cardiovascular:  RRR S1S2  Abdomen:   nontender  Extremities:  NO edema  Pulses; palpable  Neuro: grossly normal      MEDICAL DECISION MAKING;    I agree with the above plan, which was planned by myself and discussed with NP.  Miranda Haywood MD [facility]

## 2021-01-21 NOTE — CONSULTS
[address], [address] [facility]                                  CONSULTATION    PATIENT NAME: Tanner Boeck                  :        1933  MED REC NO:   [id number]                          ROOM:       [id number]  ACCOUNT NO:   [de-identified]                           ADMIT DATE: 2021  PROVIDER:     Jackelin Menchaca MD    CONSULT DATE:  2021    CONSULT REQUESTED BY:  Colette Pena MD    REASON FOR CONSULT:  CKD stage IV, may need cardiac cath. BRIEF HISTORY:  The patient is an 80-year-old male with multiple medical  conditions, most notably atherosclerotic cardiovascular disease, severe  ischemic cardiomyopathy, was brought to the emergency room via squad  after developing some chest pain while at rest.  He also had some  shortness of breath along with it, but no nausea, vomiting, or diarrhea. In the emergency room, the patient was hemodynamically stable. He  underwent several diagnostic tests, which include imaging, biochemical,  and electrographic. Imaging study, mainly chest x-ray, showed severe  cardiomegaly, but no overt pulmonary edema. EKG showed paced rhythm,  but no evidence of acute coronary syndrome. Biochemical testing showed  blood gas; pH of 7.45. BUN and creatinine close to his baseline at 31  and 2.6 respectively, and his CBC within normal range. His troponin was  0.017. He was subsequently admitted for further evaluation, mainly  cardiac workup too. This morning, when I saw him, he has some dyspnea on exertion. No  active chest pain. I am familiar with him. Interestingly, I used to be his primary care up  until , since I left as a primary care from [facility], he did  recognize me, but I did see him back in 2015 too when he was admitted  for cardiac workup.   Briefly speaking, his creatinine was 1.3 back in , slowly but surely, it increased and remaining in mid 2 range. He  did not have much proteinuria and I do not recall him being diabetic. Most recent creatinine is about 2.6 or so. PAST MEDICAL HISTORY:  1.  CKD stage IVA1. 2.  Atherosclerotic cardiovascular disease, probably involving pretty  much all vascular beds, but he does have significant coronary artery  disease, underwent three-vessel bypass in . He also had a cardiac  resynchronization therapy with biventricular pacer and ICD placement. According to him, it was placed about two years ago, so I am guessing it  is probably since 2018 or 2019. 3.  He has had several decompensated heart failures. He is on  angiotensin receptor and neprilysin inhibitor blocker now. 4.  Hypertension, but blood pressure is lately running low. 5.  BPH. 6.  Osteoarthritis. 7.  Dysrhythmia. He had an atrial fibrillation, obviously has a paced  rhythm now. PAST SURGICAL HISTORY:  1.  Bilateral knee surgery. He had a knee replacement by the way. 2.  Cardiac resynchronization therapy that was done a couple of years  ago. 3.  Appendectomy. 4.  Gallbladder surgery. 5.  Coronary artery bypass of course. He might have had other  interventions before. 6.  Bilateral hernia repair. FAMILY MEDICAL HISTORY:  Significant for coronary artery disease. Mother had diabetes and CVA as was his sister. HABITS:  He quit smoking almost 25 years ago. He has roughly  20-pack-year history. No history of alcohol or illicit drug abuse. SOCIAL HISTORY:  The patient was  for 76 years. His wife   recently. He is still very emotional about it. He was crying  remembering her. She  in 2020, I think. He has four children,  four sons mainly, currently living with one of the sons. He can do his  ADLs and drives occasionally. All things considered, for age 80, he is  doing okay. REVIEW OF SYSTEMS:  Mainly shortness of breath and chest pain. No  fever, chills, or rigors. Rest of the review of systems is negative or  as in previous paragraph. ALLERGIES:  He is listed allergic to NSAIDS, PENICILLIN, and PLAVIX,  might be an adverse reaction rather than allergic reaction. CURRENT MEDICATIONS:  Here in the hospital, he is on Eliquis 2.5 mg  twice a day, Lipitor 20 mg at bedtime, Lasix was given one dose. He is  also on Lasix 40 daily, he is on Entresto. PHYSICAL EXAMINATION:  VITAL SIGNS:  At the time of examination revealed temperature 98.2,  blood pressure 120s/80s, pulse 70s, respiratory rate 20, he is  saturating 96% on 3 liters supplemental oxygen. GENERAL:  The patient without any acute distress. He does have some  dyspnea on exertion. HEENT:  Head and Neck:  Normocephalic and atraumatic. Eyes:  I do not  see any conjunctival pallor or scleral icterus. Ears, Mouth, and  Throat:  He does have some teeth left, especially in the lower jaw. CARDIOVASCULAR:  Seems regular rate and rhythm. I do see the cardiac  device in the left chest wall, which is nontender. RESPIRATORY:  Coarse breath sound, but no crackles. ABDOMEN:  Soft. He does have a little tenderness in the left lower  quadrant area. He was not able to move bowel for a couple of days. EXTREMITIES:  No overt edema. LABORATORY VALUES AND ANCILLARY SERVICES:  As I mentioned earlier. IMPRESSION:  An 80-year-old male with CKD, comes with chest pain. 1.  Progressive CKD, now stage IVA1, likely from underlying  atherosclerotic cardiovascular disease, cardiomyopathy, prior acute  kidney injury, age, etc.  2.  Chest pain. He has high risk for major adverse cardiac event of  course. He will get some cardiac workup. 3.  Underlying atherosclerotic cardiovascular disease with severe  cardiomyopathy, dysrhythmia, etc.  He does have cardiac  resynchronization therapy. PLAN:  I am going to start with UA and urinary indices. Keep of course  angiotensin receptor and neprilysin inhibitor therapy. Okay to do the  cath of course, minimize contrast.  We will get a bladder scan, probably  okay to keep the loop too, otherwise follow clinically and  biochemically.         Ayaka Meyer MD    D: 01/21/2021 9:40:47       T: 01/21/2021 13:24:39     [email or web address]  Job#: [id number]     Doc#: [id number]    CC:

## 2021-01-21 NOTE — CARE COORDINATION
CM - Room 3018  -- pt received a chet from pt  Son Bettye Mckinley (other son was POA) inquiring ) about his father who has not been answering the phone-had been calling David Hernandez in the ER --Attempted to call  , Nurse -- no answer. CM walked upstairs --connected pt and son without further incident . Referred future calls to to [facility] -floor nurse .  Kaley Georges

## 2021-01-21 NOTE — PROGRESS NOTES
Hospitalist Progress Note         Admit Date: 1/20/2021    PCP: Cinda Holley MD     Chief Complaint   Patient presents with    Chest Pain        Assessment and Plan:     -Acute on chronic combined systolic and diastolic heart failure EF <20%  Started on Lasix IV. Continue Entresto. Not on beta-blocker per cardiology. Patient has AICD in place. Continue monitor clinically, vitals and I/os. Hospice reevaluating.  -Stage IV CKD nephro consult. Continue monitor clinically vitals and BMP  -History CAD and troponin elevation cardiac cath patent stents. Continue statin. Not on aspirin or Plavix.   Will discuss with cardiology if he needs to be on aspirin at least.      VTE prophylaxis on Eliquis from home    Current Facility-Administered Medications   Medication Dose Route Frequency Provider Last Rate Last Admin    0.9 % sodium chloride infusion   Intravenous Continuous Margaret Meckel, MD 75 mL/hr at 01/21/21 1304 New Bag at 01/21/21 1304    sodium chloride flush 0.9 % injection 10 mL  10 mL Intravenous 2 times per day Margaret Meckel, MD        sodium chloride flush 0.9 % injection 10 mL  10 mL Intravenous PRN Margaret Meckel, MD Nelwyn Bobo Levester Sarah ON 1/22/2021] apixaban (ELIQUIS) tablet 2.5 mg  2.5 mg Oral BID Margaret Meckel, MD        ipratropium-albuterol (DUONEB) nebulizer solution 1 ampule  1 ampule Inhalation Q4H PRN Kelly Jimenez MD        sodium chloride flush 0.9 % injection 10 mL  10 mL Intravenous 2 times per day RUBA Gu CNP   10 mL at 01/21/21 0825    sodium chloride flush 0.9 % injection 10 mL  10 mL Intravenous PRN RUBA Gu CNP        promethazine (PHENERGAN) tablet 12.5 mg  12.5 mg Oral Q6H PRN RUBA Gu CNP        Or    ondansetron (ZOFRAN) injection 4 mg  4 mg Intravenous Q6H PRN RUBA Gu CNP        acetaminophen (TYLENOL) tablet 650 mg  650 mg Oral Q6H PRN RUBA Gu CNP        Or  acetaminophen (TYLENOL) suppository 650 mg  650 mg Rectal Q6H PRN Ardath Maxcy, APRN - CNP        polyethylene glycol (GLYCOLAX) packet 17 g  17 g Oral Daily PRN Ardath Maxcy, APRN - CNP        atorvastatin (LIPITOR) tablet 20 mg  20 mg Oral Nightly Ardath Maxcy, APRN - CNP   20 mg at 01/20/21 2215    nitroGLYCERIN (NITROSTAT) SL tablet 0.4 mg  0.4 mg Sublingual Q5 Min PRN Ardath Maxcy, APRN - CNP        sacubitril-valsartan (ENTRESTO) 49-51 MG per tablet 1 tablet  1 tablet Oral BID Ardath Maxcy, APRN - CNP   1 tablet at 01/21/21 0825    furosemide (LASIX) tablet 40 mg  40 mg Oral Daily Ardath Maxcy, APRN - CNP   40 mg at 01/21/21 4718       Subjective:     Patient states he is very short of breath but denied chest pain. He was on hospice but he revoked hospice before getting admitted. No significant overnight events. Objective: Intake/Output Summary (Last 24 hours) at 1/21/2021 1634  Last data filed at 1/21/2021 1000  Gross per 24 hour   Intake 10 ml   Output 425 ml   Net -415 ml      Vitals:   Vitals:    01/21/21 1513   BP: (!) 128/97   Pulse: 69   Resp: 20   Temp: 98 °F (36.7 °C)   SpO2: 97%     Physical Exam:  General Appearance:    Alert, cooperative, moderate respiratory distress +  Head:      Normocephalic, without obvious abnormality, atraumatic  Eyes:       Conjunctiva/corneas clear, EOM's intact  Lungs:    B/L basal crackles + increased work of breathing +  Heart:                Regular rate and rhythm, S1 and S2 normal, no murmur,   rub or gallop  Abdomen:     Soft, non-tender, bowel sounds active, no masses, no organomegaly  Extremities:   B/L trace pedal edema +  Neurological:   Grossly Intact.     Significant Diagnostic Studies:   DATA:    CBC   Recent Labs     01/20/21  1235 01/21/21  0226   WBC 8.3 8.6   HGB 14.9 14.4   HCT 48.7 46.8    177      BMP   Recent Labs     01/20/21  1235 01/21/21  0226    137   K 4.5 4.3    102   CO2 25 23   BUN 28* 31* CREATININE 2.5* 2.6*     LFT'S   Recent Labs     01/20/21  1235 01/21/21  0226   AST 15 11*   ALT 11 10   BILITOT 1.8* 1.7*   ALKPHOS 69 67     COAG No results for input(s): INR in the last 72 hours. POC:   Lab Results   Component Value Date    POCGLU 102 11/13/2018    POCGLU 306 03/01/2017    POCGLU 98 03/01/2017     [id number]:  Lab Results   Component Value Date    LABA1C 5.5 01/19/2020     CARDIAC ENZYMES  No results for input(s): CKTOTAL, CKMB, CKMBINDEX, TROPONINI in the last 72 hours.   Troponin:   Recent Labs     01/20/21  1235 01/20/21  2045 01/21/21  0226   TROPONINT 0.023* 0.017* 0.024*     BNP:   Recent Labs     01/20/21  1235   PROBNP 58,803*     U/A:    Lab Results   Component Value Date    COLORU YELLOW 12/15/2019    WBCUA 3  5   12/15/2019    RBCUA 10  25   12/15/2019    MUCUS RARE 03/01/2017    BACTERIA NO CELLS SEEN 12/15/2019    CLARITYU SLIGHTLY CLOUDY 12/15/2019    SPECGRAV 1.012 12/15/2019    LEUKOCYTESUR SMALL NUMBER OR AMOUNT OBSERVED 12/15/2019    BLOODU LARGE NUMBER OR AMOUNT OBSERVED 12/15/2019       Echocardiogram Complete 2d With Doppler With Color    Result Date: 1/21/2021 Severely dilated left atrium. Right Atrium  PPM wiring visualized in right atrium. Mildly dilated right atrium. Right Ventricle  PPM wiring visualized within the right ventricle. Severely dilated right ventricle. Aortic Valve  Sclerotic, but non-stenotic aortic valve. Trace aortic regurgitation. Mitral Valve  Moderate mitral regurgitation. Tricuspid Valve  Moderate to severe tricuspid regurgitation; RVSP: 55 mmHg. Pulmonic Valve  The pulmonic valve was not well visualized. Pericardial Effusion  No evidence of any pericardial effusion. Pleural Effusion  No evidence of pleural effusion. Miscellaneous  Inferior vena cava is dilated, measuring at 2.6 cm, and does not collapse  with respiration.   M-Mode/2D Measurements & Calculations   LV Diastolic Dimension:  LV Systolic Dimension:  LA Dimension: 4.3 cmAO Root  6.63 cm                  6.14 cm                 Dimension: 2.8 cmLA Area:  LV FS:7.4 %              LV Volume Diastolic:    50.1 cm2  LV PW Diastolic: 2.81 cm 512 ml  LV PW Systolic: 5.84 cm  LV Volume Systolic: 602  Septum Diastolic: 1.77   ml  cm                       LV EDV/LV EDV Index:    RV Diastolic Dimension:  Septum Systolic: 4.23 cm 186 YE/547 m2LV ESV/LV  4.27 cm  CO: 4.44 l/min           ESV Index: 242 ml/134  CI: 2.47 l/m*m2          m2                      LA/Aorta: 1.54                           EF Calculated (A4C):  LV Area Diastolic: 88.7  68.4 %                  LA volume/Index: 156 ml  cm2                      EF Calculated (2D):     /05U3  LV Area Systolic: 68.8   76.4 %  cm2                           LV Length: 10.8 cm                            LVOT: 1.9 cm  Doppler Measurements & Calculations   MV Peak E-Wave: 119   AV Peak Velocity: 146 cm/s  LVOT Peak Velocity: 120  cm/s                  AV Peak Gradient: 8.53 mmHg cm/s  MV Peak A-Wave: 50.8  AV Mean Velocity: 103 cm/s  LVOT Mean Velocity: 80.7  cm/s                  AV Mean Gradient: 5 mmHg    cm/s  MV E/A Ratio: 2. 34    AV VTI: 23.4 cm             LVOT Peak Gradient: 6  MV Peak Gradient:     AV Area (Continuity):2.2    mmHgLVOT Mean Gradient: 3  5.66 mmHg             cm2                         mmHg                                                    Estimated RVSP: 55 mmHg  MV P1/2t: 44 msec     LVOT VTI: 18.2 cm           Estimated RAP:3 mmHg  MVA by PHT:5 cm2                        Estimated PASP: 44.99 mmHg  MV E' Septal                                      TR Velocity:324 cm/s  Velocity: 5.59 cm/s                               TR Gradient:41.99 mmHg  MV E' Lateral  Velocity: 7.68 cm/s  MV E/E' septal: 21.29  MV E/E' lateral:  15.49      Xr Chest Portable    Result Date: 1/21/2021  EXAMINATION: ONE XRAY VIEW OF THE CHEST 1/21/2021 9:45 am COMPARISON: 12/15/2019 HISTORY: ORDERING SYSTEM PROVIDED HISTORY: sob TECHNOLOGIST PROVIDED HISTORY: Reason for exam:->sob Reason for Exam: chest pain FINDINGS: They are significantly increased perihilar lung markings bilaterally. There are mildly increased interstitial markings diffusely. Cardiac silhouette remains enlarged. Left-sided pacer is in place. Findings most consistent with mild pulmonary edema. Xr Chest Portable    Result Date: 1/20/2021  EXAMINATION: ONE XRAY VIEW OF THE CHEST 1/20/2021 12:34 pm COMPARISON: 1/18/2020 HISTORY: ORDERING SYSTEM PROVIDED HISTORY: chest pain TECHNOLOGIST PROVIDED HISTORY: Reason for exam:->chest pain Reason for Exam: chest pain FINDINGS: AICD redemonstrated overlying the left hemithorax. Stable mild chronic interstitial changes. No focal consolidations, pleural effusions or pulmonary edema. No pneumothoraces. Cardiac and mediastinal silhouettes are stable with stable cardiomegaly. No acute bony abnormality. Stable exam without evidence for acute cardiopulmonary process. Stable cardiomegaly.            Luz Maria Cole  [facility] Hospitalist

## 2021-01-22 NOTE — PROGRESS NOTES
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 Continue Entresto. Not on beta-blocker while BP is soft. Patient has AICD in place. Continue monitor clinically, vitals and I/os. Hospice reevaluating.  -Stage IV CKD nephro consult. Continue monitor clinically vitals and BMP  -History CAD and troponin elevation but cardiac cath patent stents. Continue statin. Not on aspirin or Plavix.   Will discuss with cardiology if he needs to be on aspirin at least.       VTE prophylaxis on Eliquis from home    Current Facility-Administered Medications   Medication Dose Route Frequency Provider Last Rate Last Admin    0.9 % sodium chloride infusion   Intravenous Continuous Isac Mendoza MD 75 mL/hr at 01/21/21 1304 New Bag at 01/21/21 1304    sodium chloride flush 0.9 % injection 10 mL  10 mL Intravenous 2 times per day Isac Mendoza MD   10 mL at 01/22/21 0935    sodium chloride flush 0.9 % injection 10 mL  10 mL Intravenous PRN Isac Mendoza MD        apixaban (ELIQUIS) tablet 2.5 mg  2.5 mg Oral BID Isac Mendoza MD   2.5 mg at 01/22/21 0934    ipratropium-albuterol (DUONEB) nebulizer solution 1 ampule  1 ampule Inhalation Q4H PRN Ana Maria Shelley MD        melatonin tablet 3 mg  3 mg Oral Nightly PRN Ana Maria Shelley MD        diazePAM (VALIUM) tablet 5 mg  5 mg Oral Nightly PRN Ana Maria Shelley MD   5 mg at 01/21/21 2003    sodium chloride flush 0.9 % injection 10 mL  10 mL Intravenous 2 times per day Aldona Dolin, APRN - CNP   10 mL at 01/21/21 0825    sodium chloride flush 0.9 % injection 10 mL  10 mL Intravenous PRN Aldona Dolin, APRN - CNP        promethazine (PHENERGAN) tablet 12.5 mg  12.5 mg Oral Q6H PRN Aldona Dolin, APRN - CNP        Or  ondansetron (ZOFRAN) injection 4 mg  4 mg Intravenous Q6H PRN Natha Sine, APRN - CNP        acetaminophen (TYLENOL) tablet 650 mg  650 mg Oral Q6H PRN Natha Sine, APRN - CNP        Or    acetaminophen (TYLENOL) suppository 650 mg  650 mg Rectal Q6H PRN Natha Sine, APRN - CNP        polyethylene glycol (GLYCOLAX) packet 17 g  17 g Oral Daily PRN Natha Sine, APRN - CNP        atorvastatin (LIPITOR) tablet 20 mg  20 mg Oral Nightly Natha Sine, APRN - CNP   20 mg at 01/21/21 2003    nitroGLYCERIN (NITROSTAT) SL tablet 0.4 mg  0.4 mg Sublingual Q5 Min PRN Natha Sine, APRN - CNP        sacubitril-valsartan (ENTRESTO) 49-51 MG per tablet 1 tablet  1 tablet Oral BID Natha Sine, APRN - CNP   1 tablet at 01/21/21 2003    furosemide (LASIX) tablet 40 mg  40 mg Oral Daily Natha Sine, APRN - CNP   40 mg at 01/22/21 3078       Subjective:     Patient states he is getting better  No significant overnight events. Blood pressure in low 06A systolic    Objective: Intake/Output Summary (Last 24 hours) at 1/22/2021 1056  Last data filed at 1/22/2021 0028  Gross per 24 hour   Intake    Output 2100 ml   Net -2100 ml      Vitals:   Vitals:    01/22/21 0915   BP: (!) 84/50   Pulse: 70   Resp: 20   Temp: 97.6 °F (36.4 °C)   SpO2: 93%     Physical Exam:  General Appearance:    Alert, cooperative, moderate respiratory distress +  Head:      Normocephalic, without obvious abnormality, atraumatic  Eyes:       Conjunctiva/corneas clear, EOM's intact  Lungs:    B/L basal crackles + increased work of breathing +  Heart:                Ventricular paced rhythm, S1 and S2 normal, no murmur,   rub or gallop  Abdomen:     Soft, non-tender, bowel sounds active, no masses, no organomegaly  Extremities:   B/L trace pedal edema +  Neurological:   Grossly Intact.     Significant Diagnostic Studies:   DATA:    CBC   Recent Labs     01/20/21  1235 01/21/21  0226   WBC 8.3 8.6   HGB 14.9 14.4   HCT 48.7 46.8    177      BMP Recent Labs     01/20/21  1235 01/21/21  0226 01/22/21  0701    137 140   K 4.5 4.3 4.1    102 104   CO2 25 23 24   PHOS  --   --  3.1   BUN 28* 31* 30*   CREATININE 2.5* 2.6* 2.4*     LFT'S   Recent Labs     01/20/21  1235 01/21/21  0226 01/22/21  0701   AST 15 11* 9*   ALT 11 10 8*   BILITOT 1.8* 1.7* 1.9*   ALKPHOS 69 67 60     COAG No results for input(s): INR in the last 72 hours. POC:   Lab Results   Component Value Date    POCGLU 102 11/13/2018    POCGLU 306 03/01/2017    POCGLU 98 03/01/2017     [id number]:  Lab Results   Component Value Date    LABA1C 5.5 01/19/2020     CARDIAC ENZYMES  No results for input(s): CKTOTAL, CKMB, CKMBINDEX, TROPONINI in the last 72 hours.   Troponin:   Recent Labs     01/20/21  1235 01/20/21  2045 01/21/21 0226   TROPONINT 0.023* 0.017* 0.024*     BNP:   Recent Labs     01/20/21  1235   PROBNP 58,803*     U/A:    Lab Results   Component Value Date    COLORU YELLOW 12/15/2019    WBCUA 3  5   12/15/2019    RBCUA 10  25   12/15/2019    MUCUS RARE 03/01/2017    BACTERIA NO CELLS SEEN 12/15/2019    CLARITYU SLIGHTLY CLOUDY 12/15/2019    SPECGRAV 1.012 12/15/2019    LEUKOCYTESUR SMALL NUMBER OR AMOUNT OBSERVED 12/15/2019    BLOODU LARGE NUMBER OR AMOUNT OBSERVED 12/15/2019       Echocardiogram Complete 2d With Doppler With Color    Result Date: 1/21/2021 Transthoracic Echocardiography Report (TTE)  Demographics   Patient Name       Cate BERNARD  Date of Study       01/21/2021   Date of Birth      07/31/1933         Gender              Male   Age                80 year(s)         Race                   Patient Number     [id number]         Room Number         3018   Visit Number       [id number]   Corporate ID       [id number]   Accession Number   [id number]         Pravin ROXI Esqueda   Ordering Physician Sam Flower MD        Physician           Rosa CONNER  Procedure Type of Study   TTE procedure:ECHOCARDIOGRAM COMPLETE 2D W DOPPLER W COLOR. Procedure Date Date: 01/21/2021 Start: 08:40 AM Study Location: Portable Technical Quality: Adequate visualization Indications:Chest pain. Patient Status: Routine Height: 66 inches Weight: 156 pounds BSA: 1.8 m2 BMI: 25.18 kg/m2 HR: 86 bpm BP: 120/88 mmHg  Conclusions   Summary  Left ventricular systolic function is abnormal. Definity used to rule out  clot. Ejection fraction is visually estimated at <20%. Mild left ventricular hypertrophy. Global hypokinesis noted. Severely dilated left atrium. Severely dilated right ventricle. Sclerotic, but non-stenotic aortic valve. Moderate mitral regurgitation. Moderate to severe tricuspid regurgitation; RVSP: 55 mmHg. No evidence of any pericardial effusion. Signature   ------------------------------------------------------------------  Electronically signed by Vasiliy Barba MD  (Interpreting physician) on 01/21/2021 at 01:38 PM  ------------------------------------------------------------------   Findings   Left Ventricle  Left ventricular systolic function is abnormal.  Ejection fraction is visually estimated at <20%. Mild left ventricular hypertrophy. Global hypokinesis noted.    Left Atrium Severely dilated left atrium. Right Atrium  PPM wiring visualized in right atrium. Mildly dilated right atrium. Right Ventricle  PPM wiring visualized within the right ventricle. Severely dilated right ventricle. Aortic Valve  Sclerotic, but non-stenotic aortic valve. Trace aortic regurgitation. Mitral Valve  Moderate mitral regurgitation. Tricuspid Valve  Moderate to severe tricuspid regurgitation; RVSP: 55 mmHg. Pulmonic Valve  The pulmonic valve was not well visualized. Pericardial Effusion  No evidence of any pericardial effusion. Pleural Effusion  No evidence of pleural effusion. Miscellaneous  Inferior vena cava is dilated, measuring at 2.6 cm, and does not collapse  with respiration.   M-Mode/2D Measurements & Calculations   LV Diastolic Dimension:  LV Systolic Dimension:  LA Dimension: 4.3 cmAO Root  6.63 cm                  6.14 cm                 Dimension: 2.8 cmLA Area:  LV FS:7.4 %              LV Volume Diastolic:    57.3 cm2  LV PW Diastolic: 7.21 cm 310 ml  LV PW Systolic: 7.72 cm  LV Volume Systolic: 571  Septum Diastolic: 3.83   ml  cm                       LV EDV/LV EDV Index:    RV Diastolic Dimension:  Septum Systolic: 3.86 cm 522 PE/266 m2LV ESV/LV  4.27 cm  CO: 4.44 l/min           ESV Index: 242 ml/134  CI: 2.47 l/m*m2          m2                      LA/Aorta: 1.54                           EF Calculated (A4C):  LV Area Diastolic: 49.0  53.4 %                  LA volume/Index: 156 ml  cm2                      EF Calculated (2D):     /47X5  LV Area Systolic: 75.9   93.5 %  cm2                           LV Length: 10.8 cm                            LVOT: 1.9 cm  Doppler Measurements & Calculations   MV Peak E-Wave: 119   AV Peak Velocity: 146 cm/s  LVOT Peak Velocity: 120  cm/s                  AV Peak Gradient: 8.53 mmHg cm/s  MV Peak A-Wave: 50.8  AV Mean Velocity: 103 cm/s  LVOT Mean Velocity: 80.7  cm/s                  AV Mean Gradient: 5 mmHg    cm/s  MV E/A Ratio: 2. 34    AV VTI: 23.4 cm             LVOT Peak Gradient: 6  MV Peak Gradient:     AV Area (Continuity):2.2    mmHgLVOT Mean Gradient: 3  5.66 mmHg             cm2                         mmHg                                                    Estimated RVSP: 55 mmHg  MV P1/2t: 44 msec     LVOT VTI: 18.2 cm           Estimated RAP:3 mmHg  MVA by PHT:5 cm2                        Estimated PASP: 44.99 mmHg  MV E' Septal                                      TR Velocity:324 cm/s  Velocity: 5.59 cm/s                               TR Gradient:41.99 mmHg  MV E' Lateral  Velocity: 7.68 cm/s  MV E/E' septal: 21.29  MV E/E' lateral:  15.49      Xr Chest Portable    Result Date: 1/21/2021  EXAMINATION: ONE XRAY VIEW OF THE CHEST 1/21/2021 9:45 am COMPARISON: 12/15/2019 HISTORY: ORDERING SYSTEM PROVIDED HISTORY: sob TECHNOLOGIST PROVIDED HISTORY: Reason for exam:->sob Reason for Exam: chest pain FINDINGS: They are significantly increased perihilar lung markings bilaterally. There are mildly increased interstitial markings diffusely. Cardiac silhouette remains enlarged. Left-sided pacer is in place. Findings most consistent with mild pulmonary edema. Xr Chest Portable    Result Date: 1/20/2021  EXAMINATION: ONE XRAY VIEW OF THE CHEST 1/20/2021 12:34 pm COMPARISON: 1/18/2020 HISTORY: ORDERING SYSTEM PROVIDED HISTORY: chest pain TECHNOLOGIST PROVIDED HISTORY: Reason for exam:->chest pain Reason for Exam: chest pain FINDINGS: AICD redemonstrated overlying the left hemithorax. Stable mild chronic interstitial changes. No focal consolidations, pleural effusions or pulmonary edema. No pneumothoraces. Cardiac and mediastinal silhouettes are stable with stable cardiomegaly. No acute bony abnormality. Stable exam without evidence for acute cardiopulmonary process. Stable cardiomegaly.            Lucia Crews  [facility] Center[facility]

## 2021-01-22 NOTE — CONSULTS
[address] [place] [address] Consult Note    Date: 1/22/2021  Name: Cameron Patel  MRN: [id number]  YOB: 1933   Patient's PCP: Balwinder Hernandez MD  Hospitalist: Dr. Vivien Sandhu care admission date: 1/20/2021     Informant: Chart reviewed, discussed with Dr. Lou Cortez. I met with the patient at the bedside on January 21 and 22, 2021. CC: chest pain shortness of breath    [place]: This is a 80 y.o. male with a history of coronary artery disease (anterior wall myocardial infarction in 1998, and prior PCI), severe combined systolic and diastolic heart failure with LVEF < 20% and active BiV - ICD, valvular heart disease, and pulmonary hypertension, paroxysmal atrial fibrillation, CKD 4 admitted on 1/20/2021 from home with chest pain and shortness of breath. The patient had been on home hospice with start of care 8/13/2020, and had done fairly well. He lives with his son Cinda Ying. The patient was  June 2020. Last week, the patient had an episode of chest pain and dyspnea, managed with Morphine at home. The episode that brought the patient to the ED was more severe, and he called EMS. The patient and son discussed, and revoked hospice for more aggressive care. I saw the patient on 1/20/21, and he was still having angina and sob, and he went for cardiac catheterization, results below, but no intervention needed. The patient has had IV diuresis, with a net negative 2500 ml, and remains on Entresto. I discussed the case today with Dr. Lou Cortez. I followed up with the patient. He pulled his york catheter out overnight as he was getting up to the bathroom, and has had hematuria. He states that he feels some better today. We discussed resuming hospice services on discharge home, and he wants to think about it, and talk to his son. The patient is currently a full code, but was DNR-comfort care prior to admission.      Past Medical History:   Diagnosis Date  Additional heart attack (anterior wall) 8/98    CAD (coronary artery disease)     [facility]congest[facility] failure) [facility])     Heart imaging 6/26/14    MUGA: Abnormal study, EF 39% global hypokinesis of LV.  History of cardiovascular stress test 6/4/2014 2003 6/14 EF 34% suggest prior ant and apical MI, LV function severely reduced 2003EF=36%,perf. diminished to anteroapical segment w/minimal redistribution: sugg. of prior MI.    History of echocardiogram 6/12/14 6/14EF50-55% Mild TR, right ventricular systolic pressure is elevated at 30-40mm Hg.  History of left heart catheterization 4/29/2016    LAD 50% in stent re-stenosis, CX/RCA/LM no significant diaease.  Hyperlipidemia     Hypertension     ICD (implantable cardioverter-defibrillator), biventricular, in situ 03/20/2017    PAF (paroxysmal atrial fibrillation) ([facility])     Shingles        Past Surgical History:   Procedure Laterality Date    CARDIAC DEFIBRILLATOR PLACEMENT Left 03/20/2017    BiV/ICD     PTCA  8/13/98    stent LAD, Diag. branch    TOTAL KNEE ARTHROPLASTY  2618-1823       Social History     Socioeconomic History    Marital status:      Spouse name: Not on file    Number of children: 3    Years of education: Not on file    Highest education level: Not on file   Occupational History    Occupation: retired     Comment: [employer] Financial resource strain: Not on file    Food insecurity     Worry: Not on file     Inability: Not on file   [employer] needs     Medical: Not on file     Non-medical: Not on file   Tobacco Use    Smoking status: Former Smoker    Smokeless tobacco: Never Used    Tobacco comment: reviewed 5/9/2016.   Quit many years ago   Substance and Sexual Activity    Alcohol use: No     Alcohol/week: 0.0 standard drinks    Drug use: No    Sexual activity: Not Currently     Partners: Female     Comment:    Lifestyle    Physical activity Days per week: Not on file     Minutes per session: Not on file    Stress: Not on file   Relationships    Social connections     Talks on phone: Not on file     Gets together: Not on file     Attends Yazidi service: Not on file     Active member of club or organization: Not on file     Attends meetings of clubs or organizations: Not on file     Relationship status: Not on file    Intimate partner violence     Fear of current or ex partner: Not on file     Emotionally abused: Not on file     Physically abused: Not on file     Forced sexual activity: Not on file   Other Topics Concern    Not on file   Social History Narrative    Not on file       Family History   Problem Relation Age of Onset    Heart Attack Mother     Heart Attack Father     Heart Attack Brother     Cancer Brother        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    Plavix [Clopidogrel]      Blood in stool       Medication list reviewed  Prior to Admission medications    Medication Sig Start Date End Date Taking? Authorizing Provider   Morphine Sulfate (MORPHINE 20MG/ML) SOLN concentrated solution Place 0.25 mLs under the tongue every 3 hours as needed.  12/28/20   Historical Provider, MD   apixaban (ELIQUIS) 2.5 MG TABS tablet Take 1 tablet by mouth 2 times daily 12/30/20   Elvis Recio MD   sacubitril-valsartan [facility]) 49-51 MG per tablet Take 1 tablet by mouth 2 times daily 12/30/20   Elvis Recio MD   furosemide (LASIX) 40 MG tablet Take 1 tablet by mouth 2 times daily  Patient taking differently: Take 40 mg by mouth daily  3/16/20   Maria T Beckwith MD   nitroGLYCERIN (NITROLINGUAL) 0.4 MG/SPRAY spray Place 1 spray under the tongue every 5 minutes as needed for Chest pain    Historical Provider, MD       ROS: As noted in [address], all other systems are are negative or as follows: Constitutional: generally weak, no fever, chills, weight loss  HEENT:  No acute visual changes, nasal drainage,   CV:  see above  PULM:  Denies current shortness of breath, hemoptysis  GI:  No nausea, vomiting, diarrhea, hematemesis, melena, hematochezia  :  + hematuria post york trauma  Musculoskeletal:  No edema  Neuro: No seizures, syncope,   Skin:  No rash    Weight:    Wt Readings from Last 3 Encounters:   01/21/21 165 lb 9.6 oz (75.1 kg)   10/01/20 156 lb 9.6 oz (71 kg)   09/10/20 169 lb 6.4 oz (76.8 kg)       Data reviewed 1/22/2021:  Cardiac catheterization 1/21/21:   Cardiac Arteries and Lesion Findings   LMCA: Normal.   LAD: Moderate Lumen Irregularity. patent stents, mild instent restenosis of mid  LAD stent unchanged from 2016   LCx: Normal.   RCA: Normal.PATENT STENT    Transthoracic Echocardiogram 1/21/21:   Left ventricular systolic function is abnormal. Definity used to rule out  clot. Ejection fraction is visually estimated at <20%. Mild left ventricular hypertrophy. Global hypokinesis noted. Severely dilated left atrium. Severely dilated right ventricle. Sclerotic, but non-stenotic aortic valve. Moderate mitral regurgitation. Moderate to severe tricuspid regurgitation; RVSP: 55 mmHg. No evidence of any pericardial effusion. Chest X-ray 1/20/21:   Stable exam without evidence for acute cardiopulmonary process. Stable cardiomegaly.      Pro-BNP 1/20/21: 58,803    Recent Labs     01/20/21  1235 01/20/21  2045 01/21/21  0226   TROPONINT 0.023* 0.017* 0.024*     Hepatic Function Panel:    Lab Results   Component Value Date    ALKPHOS 60 01/22/2021    ALT 8 01/22/2021    AST 9 01/22/2021    PROT 5.8 01/22/2021    PROT 7.3 09/18/2012    BILITOT 1.9 01/22/2021    LABALBU 3.1 01/22/2021     CBC:   Recent Labs     01/20/21  1235 01/21/21  0226   WBC 8.3 8.6   HGB 14.9 14.4   HCT 48.7 46.8   MCV 96.8 97.5    177     BMP:   Recent Labs     01/20/21  1235 01/21/21  0226 01/22/21 0701    137 140   K 4.5 4.3 4.1    102 104   CO2 25 23 24   BUN 28* 31* 30*   CREATININE 2.5* 2.6* 2.4*   GLUCOSE 95 96 83       Physical Exam:   BP (!) 84/50   Pulse 70   Temp 97.6 °F (36.4 °C) (Oral)   Resp 20   Ht 5' 6\" (1.676 m)   Wt 165 lb 9.6 oz (75.1 kg)   SpO2 93%   BMI 26.73 kg/m²   General: Comfortable, lying flat today, in no distress  HEENT: Mucous membranes are moist, sclerae are clear   Neck: carotid upstrokes are diminished without bruit, JVP not visualized in his current position  Chest: ICD in left upper chest  Heart: distant tones, RRR, S1S2, soft apical WILLIAM, +S3  Lungs:  equal breath sounds bilaterally, diminished at the bases, with bibasilar rales, scattered rhonchi   Abdomen: soft, bowel sounds present, no apparent tenderness, nondistended  Extremities:  No mottling, trace pedaledema  Neurologic: drowsy but arousable, appears generally weak without focal weakness    Assessment/Plan:  1. Severe combined systolic and diastolic heart failure with LVEF of < 20%, on diuretics, Entresto. The patient is eligible for home hospice. Will follow for plan of care and patient's decision. 2. BiV - ICD in place  3. Coronary artery disease with prior anterior wall myocardial infarction and PCI of LAD  4. CKD-4  5. History of hypertension with borderline BP's that limit decongestive therapy  6.  Paroxysmal atrial fibrillation      Patient Active Problem List   Diagnosis Code    Hyperlipidemia E78.5    Ascending aortic aneurysm (HCC) I71.2    Cardiomyopathy ([facility] I42.9    Shortness of breath R06.02    Dizzy spells R42    Bradycardia R00.1    Severe left ventricular systolic dysfunction S38.3    S/P PTCA (percutaneous transluminal coronary angioplasty) Z98.61    TIA (transient ischemic attack) G45.9    Aortic stenosis, mild I35.0    S/P cardiac cath X33.356    Cerebrovascular accident (CVA) ([facility] I63.9    Hypertensive urgency I16.0    Renal insufficiency N28.9  Coronary artery disease involving native coronary artery of native heart without angina pectoris I25.10    PAF (paroxysmal atrial fibrillation) ([facility]) I48.0    Biventricular automatic implantable cardioverter defibrillator in situ Z95.810    Epistaxis R04.0    Essential hypertension I10    AICD (automatic cardioverter/defibrillator) present Z95.810    PVC (premature ventricular contraction) I49.3    CHF (congestive heart failure), NYHA class I, acute on chronic, combined ([facility]) I50.43    Acute on chronic congestive heart failure ([facility]) I50.9    CKD (chronic kidney disease) stage 3, GFR 30-59 ml/min ([facility]) W38.56    Acute systolic heart failure ([facility]) I50.21    Acute on chronic systolic heart failure ([facility]) I50.23    Elevated brain natriuretic peptide (BNP) level R79.89    Acute on chronic systolic CHF (congestive heart failure) ([facility]) I50.23    Elevated serum creatinine R79.89    Unstable angina ([facility]) I20.0    Acute chest pain R07.9    Dyspnea R06.00    Chronic systolic congestive heart failure ([facility]) I50.22    Pulmonary edema cardiac cause ([facility]) I50.1    Non-rheumatic mitral regurgitation I34.0    Chest pain R07.9    ACS (acute coronary syndrome) ([facility]) I24.9       NONA Arthur MD, [facility]

## 2021-01-22 NOTE — PROGRESS NOTES
Cardiology Progress Note     Today's Plan will sign off and be available if needed    Admit Date:  1/20/2021    Consult reason/ Seen today for: chest jpain    Subjective and  Overnight Events: States he is feeling better today. Denies any shortness of breath. Does note chest pain to the left chest wall with touch. telemetry - A- V paced rhythm with capture     Assessment / Plan / Recommendation:     1. Chest pain: elevated troponin-concern for ACS-has known CAD with PCI of LAD and Diag. -Left heart cath complete, patent LAD and RCA stents.-Continue with statin- can try BB however bp is soft   2. 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 Clinically he is improving. Shortness of breath has improved. No orthopnea or PND. He is able to lie flat. Recommend to continue daily weights, strict Is and O's -   3. Severe left ventricular systolic dysfunction -has BI V ICD in place now in decompensated heart failure. Continue with Frankey John will plan for optimization of medications  4. Paroxysmal afib: rate is controlled. On eliquis on anticoagulation   5. Dyslipidemia: continue statins  6. CKD - per primary           History of Presenting Illness:    Chief complain on admission : 80 y. o.year old who is admitted for  Chief Complaint   Patient presents with    Chest Pain        Past medical history: has a past medical history of Additional heart attack (anterior wall), CAD (coronary artery disease), CHF (congestive heart failure) ([address] Heart imaging, History of cardiovascular stress test, History of echocardiogram, History of left heart catheterization, Hyperlipidemia, Hypertension, ICD (implantable cardioverter-defibrillator), biventricular, in situ, PAF (paroxysmal atrial fibrillation) ([address] and Shingles. Past surgical history:   has a past surgical history that includes Total knee arthroplasty ([id number]); Percutaneous Transluminal Coronary Angio (8/13/98); and Cardiac defibrillator placement (Left, 03/20/2017). Social History:   reports that he has quit smoking. He has never used smokeless tobacco. He reports that he does not drink alcohol or use drugs. Family history:  family history includes Cancer in his brother; Heart Attack in his brother, father, and mother. 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    Plavix [Clopidogrel]      Blood in stool       Review of Systems:   All 14 systems were reviewed and are negative  Except for the positive findings which are documented     BP (!) 84/50   Pulse 70   Temp 97.6 °F (36.4 °C) (Oral)   Resp 20   Ht 5' 6\" (1.676 m)   Wt 165 lb 9.6 oz (75.1 kg)   SpO2 93%   BMI 26.73 kg/m²       Intake/Output Summary (Last 24 hours) at 1/22/2021 1022  Last data filed at 1/22/2021 0028  Gross per 24 hour   Intake    Output 2100 ml   Net -2100 ml       Physical Exam:  Constitutional:  Well developed,  No acute distress  HENT:  Normocephalic, Atraumatic, Bilateral external ears normal,  Nose normal.   Neck- trachea is midline  Eyes:  PEERL, Conjunctiva normal  Respiratory:  decreased breath sounds, No respiratory distress, No wheezing, No chest tenderness. Cardiovascular:  Normal heart rate, Normal rhythm, systolic murmur appreciated, No rubs appreciated, No gallops appreciated, JVP not elevated  Abdomen/GI:  Soft, No tenderness  Musculoskeletal:  Intact distal pulses, no edema to lower legs,  No tenderness, No cyanosis, No clubbing. Integument:  Warm, Dry  Lymphatic:  No lymphadenopathy noted. Neurologic:  Alert & oriented  Psychiatric:  Affect and Mood :pleasant     Medications:    sodium chloride flush  10 mL Intravenous 2 times per day    apixaban  2.5 mg Oral BID    sodium chloride flush  10 mL Intravenous 2 times per day    atorvastatin  20 mg Oral Nightly    sacubitril-valsartan  1 tablet Oral BID    furosemide  40 mg Oral Daily      sodium chloride 75 mL/hr at 01/21/21 1304     sodium chloride flush, ipratropium-albuterol, melatonin, diazePAM, sodium chloride flush, promethazine **OR** ondansetron, acetaminophen **OR** acetaminophen, polyethylene glycol, nitroGLYCERIN    Lab Data:  CBC:   Recent Labs     01/20/21  1235 01/21/21  0226   WBC 8.3 8.6   HGB 14.9 14.4   HCT 48.7 46.8   MCV 96.8 97.5    177     BMP:   Recent Labs     01/20/21  1235 01/21/21  0226 01/22/21  0701    137 140   K 4.5 4.3 4.1    102 104   CO2 25 23 24   PHOS  --   --  3.1   BUN 28* 31* 30*   CREATININE 2.5* 2.6* 2.4*     PT/INR: No results for input(s): PROTIME, INR in the last 72 hours. BNP:    Recent Labs     01/20/21  1235   PROBNP 58,803*     TROPONIN:   Recent Labs     01/20/21  1235 01/20/21  2045 01/21/21  0226   TROPONINT 0.023* 0.017* 0.024*        ECHO :       NM Myocardial Spect:       Impression:  Active Problems:    Unstable angina (HCC)    Chest pain  Resolved Problems:    * No resolved hospital problems. *       All labs, medications and tests reviewed by myself, continue all other medications of all above medical condition listed as is except for changes mentioned above. Thank you   Please call with questions. Electronically signed by RUBA Cevallos CNP on 1/22/2021 at 10:22 AM    I have seen ,spoken to  and examined this patient personally, independently of the nurse practitioner. I have reviewed the hospital care given to date and reviewed all pertinent labs and imaging. The plan was developed mutually at the time of the visit with the patient,  NP  and myself. I have spoken with patient, nursing staff and provided written and verbal instructions . The above note has been reviewed and I agree with the assessment, diagnosis, and treatment plan with changes made by me as follows     CARDIOLOGY ATTENDING ADDENDUM    HPI:  I have reviewed the above HPI  And agree with above   Destiny Miguel is a 80 y. o.year old who and presents with had concerns including Chest Pain. Chief Complaint   Patient presents with    Chest Pain     Interval history:  Mild SOB    Physical Exam:  General:  Awake, alert, NAD  Head:normal  Eye:normal  Neck:  No JVD   Chest:  Clear to auscultation, respiration easy  Cardiovascular:  RRR S1S2  Abdomen:   nontender  Extremities:  tr edema  Pulses; palpable  Neuro: grossly normal      MEDICAL DECISION MAKING;    I agree with the above plan, which was planned by myself and discussed with NP.   [facility] reviewed  Medical therapy  Will FU as OP        Jeremias Deleon MD [address] [facility]

## 2021-01-22 NOTE — CARE COORDINATION
Chart reviewed, in to see pt for dc planning. Introduced self and role explained. Pt was admitted for CP and he revoked hospice services. States he lives at home with son/Dick and is unsure if he will be continuing with hospice at discharge. CM following. 4:46 PM  Spoke with patient elenita/Dick [phone] re: plan at discharge. Dilip Richards requested a medical update and CM reviewed information from Epic notes with him though Dilip Richards was inquiring about this CM opinion and I updated him he needs to speak with the doctor and he agreed. PS to Dr. Jaskaran Cash: Attempting to speak with patient elenita about planning and he needs a medical update to make decisions re: if hospice is appropriate. Please call Sharri [phone].  Thanks, Tiff CM

## 2021-01-22 NOTE — PROGRESS NOTES
Patient pulled york out with balloon inflated, stated he needed to go poop and was getting tangled up. Blood noted coming penis. Denies any pain. Will monitor.

## 2021-01-22 NOTE — PROGRESS NOTES
Accessing pts chart as [facility] clinical instructor. [phone].       Francisco Hernandes RN

## 2021-01-23 NOTE — PROGRESS NOTES
Hospitalist Progress Note         Admit Date: 1/20/2021    PCP: Demian Alvarez MD     Chief Complaint   Patient presents with    Chest Pain        Assessment and Plan:     -Acute on chronic combined systolic and diastolic heart failure EF <20%  Continue on Lasix IV. Continue Entresto. Not on beta-blocker while BP is soft. Patient has AICD in place. Continue monitor clinically, vitals and I/os. Hospice will sign off.  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 Continue monitor clinically vitals and BMP  -History CAD and troponin elevation but cardiac cath patent stents. Continue statin. Not on aspirin or Plavix.   Will discuss with cardiology if he needs to be on aspirin at least.       VTE prophylaxis on Eliquis from home  DC plan hopefully in 1-2 days    Current Facility-Administered Medications   Medication Dose Route Frequency Provider Last Rate Last Admin    sodium chloride flush 0.9 % injection 10 mL  10 mL Intravenous 2 times per day Mary Jane Garcia MD   10 mL at 01/23/21 1057    sodium chloride flush 0.9 % injection 10 mL  10 mL Intravenous PRN Mary Jane Garcia MD        apixaban (ELIQUIS) tablet 2.5 mg  2.5 mg Oral BID Mary Jane Garcia MD   2.5 mg at 01/23/21 1057    ipratropium-albuterol (DUONEB) nebulizer solution 1 ampule  1 ampule Inhalation Q4H PRN MD Franklin        melatonin tablet 3 mg  3 mg Oral Nightly PRN MD Franklin   3 mg at 01/22/21 2045    diazePAM (VALIUM) tablet 5 mg  5 mg Oral Nightly PRN MD Franklin   5 mg at 01/21/21 2003    sodium chloride flush 0.9 % injection 10 mL  10 mL Intravenous 2 times per day RUBA Stevens - CNP   10 mL at 01/23/21 1058    sodium chloride flush 0.9 % injection 10 mL  10 mL Intravenous PRN RUBA Stevens - CNP        promethazine (PHENERGAN) tablet 12.5 mg  12.5 mg Oral Q6H PRN RUBA Stevens - CNP        Or    ondansetron (ZOFRAN) injection 4 mg  4 mg Intravenous Q6H PRN Edelmira RUBA Moura - CNP  acetaminophen (TYLENOL) tablet 650 mg  650 mg Oral Q6H PRN Ardath Maxcy, APRN - CNP        Or    acetaminophen (TYLENOL) suppository 650 mg  650 mg Rectal Q6H PRN Ardath Maxcy, APRN - CNP        polyethylene glycol (GLYCOLAX) packet 17 g  17 g Oral Daily PRN Ardath Maxcy, APRN - CNP        atorvastatin (LIPITOR) tablet 20 mg  20 mg Oral Nightly Ardath Maxcy, APRN - CNP   20 mg at 01/22/21 2045    nitroGLYCERIN (NITROSTAT) SL tablet 0.4 mg  0.4 mg Sublingual Q5 Min PRN Ardath Maxcy, APRN - CNP        sacubitril-valsartan (ENTRESTO) 49-51 MG per tablet 1 tablet  1 tablet Oral BID Ardath Maxcy, APRN - CNP   1 tablet at 01/23/21 1057    furosemide (LASIX) tablet 40 mg  40 mg Oral Daily Ardath Maxcy, APRN - CNP   40 mg at 01/23/21 1057       Subjective:     Patient states he is getting better  No significant overnight events. Blood pressure is better today    Objective:     No intake or output data in the 24 hours ending 01/23/21 1353   Vitals:   Vitals:    01/23/21 1059   BP: 124/79   Pulse: 76   Resp: 16   Temp: 97.4 °F (36.3 °C)   SpO2:      Physical Exam:  General Appearance:    Alert, cooperative, improved respiratory distress  Head:      Normocephalic, without obvious abnormality, atraumatic  Eyes:       Conjunctiva/corneas clear, EOM's intact  Lungs:    Improving B/L basal crackles +   Heart:                Ventricular paced rhythm, S1 and S2 normal, no murmur,   rub or gallop  Abdomen:     Soft, non-tender, bowel sounds active, no masses, no organomegaly  Extremities:   Improving B/L trace pedal edema +  Neurological:   Grossly Intact.     Significant Diagnostic Studies:   DATA:    CBC   Recent Labs     01/21/21 0226 01/23/21  0536   WBC 8.6 7.2   HGB 14.4 13.8   HCT 46.8 43.9    157      BMP   Recent Labs     01/21/21  0226 01/22/21  0701 01/23/21  0536    140 134*   K 4.3 4.1 3.8    104 100   CO2 23 24 24   PHOS  --  3.1  --    BUN 31* 30* 31*   CREATININE 2.6* 2.4* 2.2*     LFT'S Recent Labs     01/21/21  0226 01/22/21  0701 01/23/21  0536   AST 11* 9* 9*   ALT 10 8* 7*   BILITOT 1.7* 1.9* 1.4*   ALKPHOS 67 60 57     COAG No results for input(s): INR in the last 72 hours. POC:   Lab Results   Component Value Date    POCGLU 102 11/13/2018    POCGLU 306 03/01/2017    POCGLU 98 03/01/2017     [id number]:  Lab Results   Component Value Date    LABA1C 5.5 01/19/2020     CARDIAC ENZYMES  No results for input(s): CKTOTAL, CKMB, CKMBINDEX, TROPONINI in the last 72 hours. Troponin:   Recent Labs     01/20/21 2045 01/21/21 0226   TROPONINT 0.017* 0.024*     BNP:   No results for input(s): PROBNP in the last 72 hours.   U/A:    Lab Results   Component Value Date    COLORU YELLOW 12/15/2019    WBCUA 3  5   12/15/2019    RBCUA 10  25   12/15/2019    MUCUS RARE 03/01/2017    BACTERIA NO CELLS SEEN 12/15/2019    CLARITYU SLIGHTLY CLOUDY 12/15/2019    SPECGRAV 1.012 12/15/2019    LEUKOCYTESUR SMALL NUMBER OR AMOUNT OBSERVED 12/15/2019    BLOODU LARGE NUMBER OR AMOUNT OBSERVED 12/15/2019       Echocardiogram Complete 2d With Doppler With Color    Result Date: 1/21/2021 Transthoracic Echocardiography Report (TTE)  Demographics   Patient Name       Cate BERNARD  Date of Study       01/21/2021   Date of Birth      07/31/1933         Gender              Male   Age                80 year(s)         Race                   Patient Number     [id number]         Room Number         3018   Visit Number       [id number]   Corporate ID       [id number]   Accession Number   [id number]         Pravin Esqueda RDMS   Ordering Physician Sam Flower MD        Physician           Rosa CONNER  Procedure Type of Study   TTE procedure:ECHOCARDIOGRAM COMPLETE 2D W DOPPLER W COLOR. Procedure Date Date: 01/21/2021 Start: 08:40 AM Study Location: Portable Technical Quality: Adequate visualization Indications:Chest pain. Patient Status: Routine Height: 66 inches Weight: 156 pounds BSA: 1.8 m2 BMI: 25.18 kg/m2 HR: 86 bpm BP: 120/88 mmHg  Conclusions   Summary  Left ventricular systolic function is abnormal. Definity used to rule out  clot. Ejection fraction is visually estimated at <20%. Mild left ventricular hypertrophy. Global hypokinesis noted. Severely dilated left atrium. Severely dilated right ventricle. Sclerotic, but non-stenotic aortic valve. Moderate mitral regurgitation. Moderate to severe tricuspid regurgitation; RVSP: 55 mmHg. No evidence of any pericardial effusion. Signature   ------------------------------------------------------------------  Electronically signed by Vasiliy Barba MD  (Interpreting physician) on 01/21/2021 at 01:38 PM  ------------------------------------------------------------------   Findings   Left Ventricle  Left ventricular systolic function is abnormal.  Ejection fraction is visually estimated at <20%. Mild left ventricular hypertrophy. Global hypokinesis noted.    Left Atrium 2. 34    AV VTI: 23.4 cm             LVOT Peak Gradient: 6  MV Peak Gradient:     AV Area (Continuity):2.2    mmHgLVOT Mean Gradient: 3  5.66 mmHg             cm2                         mmHg                                                    Estimated RVSP: 55 mmHg  MV P1/2t: 44 msec     LVOT VTI: 18.2 cm           Estimated RAP:3 mmHg  MVA by PHT:5 cm2                        Estimated PASP: 44.99 mmHg  MV E' Septal                                      TR Velocity:324 cm/s  Velocity: 5.59 cm/s                               TR Gradient:41.99 mmHg  MV E' Lateral  Velocity: 7.68 cm/s  MV E/E' septal: 21.29  MV E/E' lateral:  15.49      Xr Chest Portable    Result Date: 1/21/2021  EXAMINATION: ONE XRAY VIEW OF THE CHEST 1/21/2021 9:45 am COMPARISON: 12/15/2019 HISTORY: ORDERING SYSTEM PROVIDED HISTORY: sob TECHNOLOGIST PROVIDED HISTORY: Reason for exam:->sob Reason for Exam: chest pain FINDINGS: They are significantly increased perihilar lung markings bilaterally. There are mildly increased interstitial markings diffusely. Cardiac silhouette remains enlarged. Left-sided pacer is in place. Findings most consistent with mild pulmonary edema. Xr Chest Portable    Result Date: 1/20/2021  EXAMINATION: ONE XRAY VIEW OF THE CHEST 1/20/2021 12:34 pm COMPARISON: 1/18/2020 HISTORY: ORDERING SYSTEM PROVIDED HISTORY: chest pain TECHNOLOGIST PROVIDED HISTORY: Reason for exam:->chest pain Reason for Exam: chest pain FINDINGS: AICD redemonstrated overlying the left hemithorax. Stable mild chronic interstitial changes. No focal consolidations, pleural effusions or pulmonary edema. No pneumothoraces. Cardiac and mediastinal silhouettes are stable with stable cardiomegaly. No acute bony abnormality. Stable exam without evidence for acute cardiopulmonary process. Stable cardiomegaly.            [facility] Hospitalist

## 2021-01-23 NOTE — PROGRESS NOTES
Nephrology Progress Note  1/23/2021 4:04 PM        Subjective:   Admit Date: 1/20/2021  PCP: Vernon Hercules MD    Interval History: seen in am     Diet: some    ROS:  fatigue lethargic - in bed , awake     Data:     Current meds:    sodium chloride flush  10 mL Intravenous 2 times per day    apixaban  2.5 mg Oral BID    sodium chloride flush  10 mL Intravenous 2 times per day    atorvastatin  20 mg Oral Nightly    sacubitril-valsartan  1 tablet Oral BID    furosemide  40 mg Oral Daily           No intake/output data recorded. CBC:   Recent Labs     01/21/21 0226 01/23/21  0536   WBC 8.6 7.2   HGB 14.4 13.8    157          Recent Labs     01/21/21 0226 01/22/21  0701 01/23/21  0536    140 134*   K 4.3 4.1 3.8    104 100   CO2 23 24 24   BUN 31* 30* 31*   CREATININE 2.6* 2.4* 2.2*   GLUCOSE 96 83 80       Lab Results   Component Value Date    CALCIUM 8.5 01/23/2021    PHOS 3.1 01/22/2021       Objective:     Vitals: /73   Pulse 73   Temp 98 °F (36.7 °C) (Oral)   Resp 19   Ht 5' 6\" (1.676 m)   Wt 162 lb 9.6 oz (73.8 kg)   SpO2 95%   BMI 26.24 kg/m²     General appearance:  As above  HEENT:  No conj pallor  Neck:  supple  Lungs:  No gross crackles- Lt chest wall cardiac device  Heart:  RRR  Abdomen: soft  Extremities:  No  overt edema       Problem List :         Impression :     1. CKD stage 4 A1- stable  2. Sever CMP- / ASCVD / a. Fib etc with ARNI and DOAC  3. No overt pulmo edema   4. ? Hematuria - UA was not sent yet    Recommendation/Plan  :     1. UA   2. follow clinically]  3. Watch for pulmo edema   4. He is with small dose of po loop   5.  Dr Kieran Faustin  note reviewed       Alberta Espinoza MD

## 2021-01-24 NOTE — PROGRESS NOTES
Nephrology Progress Note  1/24/2021 11:21 AM        Subjective:   Admit Date: 1/20/2021  PCP: Marlon Soto MD    Interval History: in bed - fatigue - frail     Diet: some    ROS:  No sob , no overt confusion- VT on monitor - asymptomatic     Data:     Current meds:    sodium chloride flush  10 mL Intravenous 2 times per day    apixaban  2.5 mg Oral BID    sodium chloride flush  10 mL Intravenous 2 times per day    atorvastatin  20 mg Oral Nightly    sacubitril-valsartan  1 tablet Oral BID    furosemide  40 mg Oral Daily           I/O last 3 completed shifts: In: 200 [P.O.:200]  Out: -     CBC:   Recent Labs     01/23/21  0536   WBC 7.2   HGB 13.8             Recent Labs     01/22/21  0701 01/23/21  0536    134*   K 4.1 3.8    100   CO2 24 24   BUN 30* 31*   CREATININE 2.4* 2.2*   GLUCOSE 83 80       Lab Results   Component Value Date    CALCIUM 8.5 01/23/2021    PHOS 3.1 01/22/2021       Objective:     Vitals: /77   Pulse 77   Temp 97.8 °F (36.6 °C) (Oral)   Resp 18   Ht 5' 6\" (1.676 m)   Wt 162 lb (73.5 kg)   SpO2 93%   BMI 26.15 kg/m²     General appearance:  No ac distress  HEENT:  No gross conj pallor  Neck:  Supple- Lt chest wall cardiac device   Lungs:  No gross crackles   Heart:  Seems RRR  Abdomen: soft   Extremities:  No overt edema of LE       Problem List :         Impression :     1. CKD stage 4 A1- stable  2. CMP sever somehow compensated with ARNI and loop   3. dysrhythmia with DOPAC had ./ VT   4. Debility- frailty     Recommendation/Plan  :     1. May d/C from renal standpoint   2. Low salt- DASh diet  3. He  Was sen by Hospice service   4. Follow clinically  5. If hospice no myla for f/U  6. If not   7. F/U with em in 2-3 wk's  8.  BMP prior to apt       Vishal Addison MD

## 2021-01-24 NOTE — PROGRESS NOTES
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 Continue Entresto. Not on beta-blocker while BP is soft. Patient has AICD in place. Continue monitor clinically, vitals and I/os. Hospice signed off.  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 Continue monitor clinically vitals and BMP  -History CAD and troponin elevation but cardiac cath patent stents. Continue statin. Not on aspirin or Plavix. Will discuss with cardiology if he needs to be on aspirin at least.       VTE prophylaxis on Eliquis from home  DC plan hopefully tomorrow if he continues to do well.     Current Facility-Administered Medications   Medication Dose Route Frequency Provider Last Rate Last Admin    sodium chloride flush 0.9 % injection 10 mL  10 mL Intravenous 2 times per day Anmol Valle MD   10 mL at 01/24/21 0832    sodium chloride flush 0.9 % injection 10 mL  10 mL Intravenous PRN Anmol Valle MD        apixaban (ELIQUIS) tablet 2.5 mg  2.5 mg Oral BID Anmol Valle MD   2.5 mg at 01/24/21 0832    ipratropium-albuterol (DUONEB) nebulizer solution 1 ampule  1 ampule Inhalation Q4H PRN Blu Zavala MD        melatonin tablet 3 mg  3 mg Oral Nightly PRN Blu Zavala MD   3 mg at 01/23/21 2119    diazePAM (VALIUM) tablet 5 mg  5 mg Oral Nightly PRN Blu Zavala MD   5 mg at 01/21/21 2003    sodium chloride flush 0.9 % injection 10 mL  10 mL Intravenous 2 times per day Dorthey Soulier, APRN - CNP   10 mL at 01/23/21 2120    sodium chloride flush 0.9 % injection 10 mL  10 mL Intravenous PRN Dorthey Soulier, APRN - CNP        promethazine (PHENERGAN) tablet 12.5 mg  12.5 mg Oral Q6H PRN Dorthey Soulier, APRN - CNP        Or  ondansetron (ZOFRAN) injection 4 mg  4 mg Intravenous Q6H PRN Joe Rings, APRN - CNP        acetaminophen (TYLENOL) tablet 650 mg  650 mg Oral Q6H PRN Joe Rings, APRN - CNP        Or    acetaminophen (TYLENOL) suppository 650 mg  650 mg Rectal Q6H PRN Joe Rings, APRN - CNP        polyethylene glycol (GLYCOLAX) packet 17 g  17 g Oral Daily PRN Joe Rings, APRN - CNP        atorvastatin (LIPITOR) tablet 20 mg  20 mg Oral Nightly Joe Rings, APRN - CNP   20 mg at 01/23/21 2120    nitroGLYCERIN (NITROSTAT) SL tablet 0.4 mg  0.4 mg Sublingual Q5 Min PRN Joe Rings, APRN - CNP        sacubitril-valsartan (ENTRESTO) 49-51 MG per tablet 1 tablet  1 tablet Oral BID Joe Rings, APRN - CNP   1 tablet at 01/24/21 7782    furosemide (LASIX) tablet 40 mg  40 mg Oral Daily Joe Rings, APRN - CNP   40 mg at 01/24/21 3535       Subjective:     Patient states he is getting better  No significant overnight events. Objective: Intake/Output Summary (Last 24 hours) at 1/24/2021 0935  Last data filed at 1/24/2021 0830  Gross per 24 hour   Intake 380 ml   Output 425 ml   Net -45 ml      Vitals:   Vitals:    01/24/21 0827   BP: 112/77   Pulse: 77   Resp: 18   Temp: 97.8 °F (36.6 °C)   SpO2: 93%     Physical Exam:  General Appearance:    Alert, cooperative, not in distress  Head:      Normocephalic, without obvious abnormality, atraumatic  Eyes:       Conjunctiva/corneas clear, EOM's intact  Lungs:    Improved B/L basal crackles +   Heart:                Ventricular paced rhythm, S1 and S2 normal, no murmur,   rub or gallop  Abdomen:     Soft, non-tender, bowel sounds active, no masses, no organomegaly  Extremities:   Improved pedal edema  Neurological:   Grossly Intact.     Significant Diagnostic Studies:   DATA:    CBC   Recent Labs     01/23/21  0536   WBC 7.2   HGB 13.8   HCT 43.9         BMP   Recent Labs     01/22/21  0701 01/23/21  0536    134*   K 4.1 3.8    100   CO2 24 24 PHOS 3.1  --    BUN 30* 31*   CREATININE 2.4* 2.2*     LFT'S   Recent Labs     01/22/21  0701 01/23/21  0536   AST 9* 9*   ALT 8* 7*   BILITOT 1.9* 1.4*   ALKPHOS 60 57     COAG No results for input(s): INR in the last 72 hours. POC:   Lab Results   Component Value Date    POCGLU 102 11/13/2018    POCGLU 306 03/01/2017    POCGLU 98 03/01/2017     [id number]:  Lab Results   Component Value Date    LABA1C 5.5 01/19/2020     CARDIAC ENZYMES  No results for input(s): CKTOTAL, CKMB, CKMBINDEX, TROPONINI in the last 72 hours. Troponin:   No results for input(s): TROPONINT in the last 72 hours. BNP:   No results for input(s): PROBNP in the last 72 hours.   U/A:    Lab Results   Component Value Date    COLORU YELLOW 12/15/2019    WBCUA 3  5   12/15/2019    RBCUA 10  25   12/15/2019    MUCUS RARE 03/01/2017    BACTERIA NO CELLS SEEN 12/15/2019    CLARITYU SLIGHTLY CLOUDY 12/15/2019    SPECGRAV 1.012 12/15/2019    LEUKOCYTESUR SMALL NUMBER OR AMOUNT OBSERVED 12/15/2019    BLOODU LARGE NUMBER OR AMOUNT OBSERVED 12/15/2019       Echocardiogram Complete 2d With Doppler With Color    Result Date: 1/21/2021 Transthoracic Echocardiography Report (TTE)  Demographics   Patient Name       Maykel BERNARD  Date of Study       01/21/2021   Date of Birth      07/31/1933         Gender              Male   Age                80 year(s)         Race                   Patient Number     [id number]         Room Number         3018   Visit Number       [id number]   Corporate ID       [id number]   Accession Number   [id number]         Cailin Enciso RDMS   Ordering Physician Nelda Chiang MD        Physician           Rosa CONNER  Procedure Type of Study   TTE procedure:ECHOCARDIOGRAM COMPLETE 2D W DOPPLER W COLOR. Procedure Date Date: 01/21/2021 Start: 08:40 AM Study Location: Portable Technical Quality: Adequate visualization Indications:Chest pain. Patient Status: Routine Height: 66 inches Weight: 156 pounds BSA: 1.8 m2 BMI: 25.18 kg/m2 HR: 86 bpm BP: 120/88 mmHg  Conclusions   Summary  Left ventricular systolic function is abnormal. Definity used to rule out  clot. Ejection fraction is visually estimated at <20%. Mild left ventricular hypertrophy. Global hypokinesis noted. Severely dilated left atrium. Severely dilated right ventricle. Sclerotic, but non-stenotic aortic valve. Moderate mitral regurgitation. Moderate to severe tricuspid regurgitation; RVSP: 55 mmHg. No evidence of any pericardial effusion. Signature   ------------------------------------------------------------------  Electronically signed by Lara Rosen MD  (Interpreting physician) on 01/21/2021 at 01:38 PM  ------------------------------------------------------------------   Findings   Left Ventricle  Left ventricular systolic function is abnormal.  Ejection fraction is visually estimated at <20%. Mild left ventricular hypertrophy. Global hypokinesis noted.    Left Atrium Severely dilated left atrium. Right Atrium  PPM wiring visualized in right atrium. Mildly dilated right atrium. Right Ventricle  PPM wiring visualized within the right ventricle. Severely dilated right ventricle. Aortic Valve  Sclerotic, but non-stenotic aortic valve. Trace aortic regurgitation. Mitral Valve  Moderate mitral regurgitation. Tricuspid Valve  Moderate to severe tricuspid regurgitation; RVSP: 55 mmHg. Pulmonic Valve  The pulmonic valve was not well visualized. Pericardial Effusion  No evidence of any pericardial effusion. Pleural Effusion  No evidence of pleural effusion. Miscellaneous  Inferior vena cava is dilated, measuring at 2.6 cm, and does not collapse  with respiration.   M-Mode/2D Measurements & Calculations   LV Diastolic Dimension:  LV Systolic Dimension:  LA Dimension: 4.3 cmAO Root  6.63 cm                  6.14 cm                 Dimension: 2.8 cmLA Area:  LV FS:7.4 %              LV Volume Diastolic:    96.8 cm2  LV PW Diastolic: 0.91 cm 902 ml  LV PW Systolic: 5.77 cm  LV Volume Systolic: 573  Septum Diastolic: 0.58   ml  cm                       LV EDV/LV EDV Index:    RV Diastolic Dimension:  Septum Systolic: 2.96 cm 918 AL/344 m2LV ESV/LV  4.27 cm  CO: 4.44 l/min           ESV Index: 242 ml/134  CI: 2.47 l/m*m2          m2                      LA/Aorta: 1.54                           EF Calculated (A4C):  LV Area Diastolic: 56.9  64.6 %                  LA volume/Index: 156 ml  cm2                      EF Calculated (2D):     /97H0  LV Area Systolic: 82.3   43.7 %  cm2                           LV Length: 10.8 cm                            LVOT: 1.9 cm  Doppler Measurements & Calculations   MV Peak E-Wave: 119   AV Peak Velocity: 146 cm/s  LVOT Peak Velocity: 120  cm/s                  AV Peak Gradient: 8.53 mmHg cm/s  MV Peak A-Wave: 50.8  AV Mean Velocity: 103 cm/s  LVOT Mean Velocity: 80.7  cm/s                  AV Mean Gradient: 5 mmHg    cm/s  MV E/A Ratio: 2. 34    AV VTI: 23.4 cm             LVOT Peak Gradient: 6  MV Peak Gradient:     AV Area (Continuity):2.2    mmHgLVOT Mean Gradient: 3  5.66 mmHg             cm2                         mmHg                                                    Estimated RVSP: 55 mmHg  MV P1/2t: 44 msec     LVOT VTI: 18.2 cm           Estimated RAP:3 mmHg  MVA by PHT:5 cm2                        Estimated PASP: 44.99 mmHg  MV E' Septal                                      TR Velocity:324 cm/s  Velocity: 5.59 cm/s                               TR Gradient:41.99 mmHg  MV E' Lateral  Velocity: 7.68 cm/s  MV E/E' septal: 21.29  MV E/E' lateral:  15.49      Xr Chest Portable    Result Date: 1/21/2021  EXAMINATION: ONE XRAY VIEW OF THE CHEST 1/21/2021 9:45 am COMPARISON: 12/15/2019 HISTORY: ORDERING SYSTEM PROVIDED HISTORY: sob TECHNOLOGIST PROVIDED HISTORY: Reason for exam:->sob Reason for Exam: chest pain FINDINGS: They are significantly increased perihilar lung markings bilaterally. There are mildly increased interstitial markings diffusely. Cardiac silhouette remains enlarged. Left-sided pacer is in place. Findings most consistent with mild pulmonary edema. Xr Chest Portable    Result Date: 1/20/2021  EXAMINATION: ONE XRAY VIEW OF THE CHEST 1/20/2021 12:34 pm COMPARISON: 1/18/2020 HISTORY: ORDERING SYSTEM PROVIDED HISTORY: chest pain TECHNOLOGIST PROVIDED HISTORY: Reason for exam:->chest pain Reason for Exam: chest pain FINDINGS: AICD redemonstrated overlying the left hemithorax. Stable mild chronic interstitial changes. No focal consolidations, pleural effusions or pulmonary edema. No pneumothoraces. Cardiac and mediastinal silhouettes are stable with stable cardiomegaly. No acute bony abnormality. Stable exam without evidence for acute cardiopulmonary process. Stable cardiomegaly.            Joyce Tyler  [facility] Hospitalist

## 2021-01-25 NOTE — PROGRESS NOTES
Nephrology Progress Note  1/25/2021 7:39 AM        Subjective:   Admit Date: 1/20/2021  PCP: Jyoti Martinez MD    Interval History: in bed     Diet: ? Some     ROS:  Foot/ leg pain -     Data:     Current meds:    sodium chloride flush  10 mL Intravenous 2 times per day    apixaban  2.5 mg Oral BID    sodium chloride flush  10 mL Intravenous 2 times per day    atorvastatin  20 mg Oral Nightly    sacubitril-valsartan  1 tablet Oral BID    furosemide  40 mg Oral Daily           I/O last 3 completed shifts: In: 190 [P.O.:180; I.V.:10]  Out: 425 [Urine:425]    CBC:   Recent Labs     01/23/21  0536   WBC 7.2   HGB 13.8             Recent Labs     01/23/21  0536   *   K 3.8      CO2 24   BUN 31*   CREATININE 2.2*   GLUCOSE 80       Lab Results   Component Value Date    CALCIUM 8.5 01/23/2021    PHOS 3.1 01/22/2021       Objective:     Vitals: /76   Pulse 75   Temp 98.4 °F (36.9 °C) (Oral)   Resp 20   Ht 5' 6\" (1.676 m)   Wt 162 lb (73.5 kg)   SpO2 93%   BMI 26.15 kg/m²     General appearance:  Mild distress sec to legs pain   HEENT:  No gross crackles   Neck:  Supple-   Lungs:  No crackles - Lt chest wall cardiac device   Heart:  Seems RRR  Abdomen: soft  Extremities:  No edema   I do not see any warmness/ effusion . Or skin lesion of both legs       Problem List :         Impression :     1. CKD stage 4 A1- cr was stable as off 1/23/21   2. ADVF/ASCVD compensated  3. UA has mild microscopic hematuria- acceptable  4. dysrhythmia with DOAC   5. debility    Recommendation/Plan  :     1. Ok to d/C from my standpoint   2. Low salt\  3. DASH diet   4. CMP , mg phos in 1-2 2wks  5. F/U with em in 2-3 wk's  6.  Call me with wt gain > 2 lbs , more sob or edema       Minus Cara CONNER

## 2021-01-25 NOTE — DISCHARGE SUMMARY
Karri Johnson 7/31/1933 [id number]  PCP:  Sameer Hankins MD    Admit date: 1/20/2021  Admitting Physician: Rafat Tatum MD    Discharge date: 1/25/2021 Discharge Physician: Rafat Tatum MD         Discharge Diagnoses. 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 Continue Entresto. Not on beta-blocker while BP is soft. Patient has AICD in place. Continue monitor clinically, vitals and I/os. Hospice signed off.  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 Continue monitor clinically vitals and BMP  -History CAD and troponin elevation but cardiac cath patent stents. Not on aspirin or Plavix or statin. Patient has NSAID allergy. Start statin low-dose.        VTE prophylaxis on Eliquis from home    On the day of discharge, pt felt better. No new complaints.     Pertinent Exam Findings on Day of Discharge:  General Appearance:    Alert, cooperative, no distress, appears stated age  Head:    Normocephalic, without obvious abnormality, atraumatic  Eyes:    PERRL, conjunctiva/corneas clear, EOM's intact  Lungs:    Clear to auscultation bilaterally, respirations unlabored   Heart:    Regular rate and rhythm, S1 and S2 normal, no murmur,   rub or gallop  Abdomen:     Soft, non-tender, bowel sounds active, no masses, no organomegaly  Extremities:   Extremities normal, atraumatic, no cyanosis or edema    Consults:  IP CONSULT TO HOSPITALIST  IP CONSULT TO CARDIOLOGY  IP CONSULT TO NEPHROLOGY    Patient Instructions:   Nigel Caldwell   Home Medication Instructions [facility]:[id number]    Printed on:01/25/21 1050   Medication Information                      apixaban (ELIQUIS) 2.5 MG TABS tablet  Take 1 tablet by mouth 2 times daily             furosemide (LASIX) 40 MG tablet  Take 1 tablet by mouth 2 times daily Morphine Sulfate (MORPHINE 20MG/ML) SOLN concentrated solution  Place 0.25 mLs under the tongue every 3 hours as needed. nitroGLYCERIN (NITROLINGUAL) 0.4 MG/SPRAY spray  Place 1 spray under the tongue every 5 minutes as needed for Chest pain             sacubitril-valsartan (ENTRESTO) 49-51 MG per tablet  Take 1 tablet by mouth 2 times daily                   Diet:  DIET LOW SODIUM 2 GM; Activity:   activity as tolerated     Discharge Condition:   good    Disposition:   home    Follow-up    Lily Brock MD  Go to  Medical Management  [facility]   Keli Abel MD  Go in 2 weeks  Medical Management of CKD  [address]  [phone]   Carissa Castle MD  Go in 10 days  Medical Management of CHF  [address] [address]  [phone]       Time spent on discharge in the examination, evaluation, counseling and review of medications and discharge plan: 33  minutes       Discharge Physician Signed: Denver Manning

## 2021-01-25 NOTE — PROGRESS NOTES
[facility] Liaison referral from [address] [place] [address] and info faxed to Justin William. Pt already dc'd.

## 2021-01-25 NOTE — PROGRESS NOTES
Seen by Cath Lab nurse. Patient asleep and information left at bedside regarding CHF & Pneumonia education post NSTEMI.

## 2021-01-26 NOTE — TELEPHONE ENCOUNTER
With a history of coronary disease, it is always recommended to be on a statin medication such as Lipitor. If he has had a problem with Lipitor in the past and I do not recommend it. We can try different statin if he would like.

## 2021-01-26 NOTE — TELEPHONE ENCOUNTER
Patient's son stated he would like to wait until the appointment on 02/05/2021 and discuss it with you then.

## 2021-01-26 NOTE — CARE COORDINATION
[place] [facility] Transitions Initial Follow Up Call    Call within 2 business days of discharge: Yes    Patient: Tere Carrillo Patient : 1933   MRN: [id number]  Reason for Admission: A/C CHF  Discharge Date: 21 RARS: Readmission Risk Score: 12  Facility: [address] [place] [address] [employer]       Complaint Diagnosis Description Type Department Provider    21 Chest Pain Chest pain, unspecified type . .. ED to Hosp-Admission (Discharged) (Melida Babinski) Carmencita Staton MD        Non-face-to-face services provided:  Obtained and reviewed discharge summary and/or continuity of care documents  Education of patient/family/caregiver/guardian to support self-management-Covid19, CHF  Assessment and support for treatment adherence and medication management-.    Care Transitions 24 Hour Call    Care Transitions Interventions       Follow Up  Future Appointments   Date Time Provider My Brewer   2021  2:45 Mauro Hayes MD [id number] [facility]   2021  1:40 PM Virginia Gipson MD [facility]   . COVID19 RISK MONITORING  COVID19 SCREEN: 2021 Negative  PATIENT RISK FACTORS: CHF, Age    Spoke w/ Kylah Acton, Son/POA for initial [address] discharge call as Patient resting . Reports Patient \"slowly improving\". CHF Zone Mgt reviewed. Denies noted or reports sob, cough, cp, orthopnea, edema. Discussed importance of daily weights, low sodium diet and fluid restriction. Patient has scale and able to weigh w/ assistance. Informed Son on 2021 inpt weight of 162# as baseline wt. Reports Patient tired, sleeping a lot. Denies acute distress or need for MD notification. Confirmed copy of AVS received, reviewed. No new rx upon d/c. Patient has stopped Baclofen, Coreg, Zaroxolyn, ProAmatine, Klor Con as directed. Advised obtaining 90 day supply of routine and prn meds.      Reviewed the following, verbalizes understanding  From CDC: Are you at higher risk for severe illness?  Wash your hands often.  Avoid close contact (6 feet, which is about two arm lengths) with people who are sick.  Put distance between yourself and other people if COVID-19 is spreading in your community.  Clean and disinfect frequently touched surfaces.  Wear mask covering nose and mouth when unable to social distance   Call your healthcare professional if you have concerns about COVID-19 and your underlying condition or if you are sick. Son to schedule 7 day hosp f/u appts w/ PCP, Cardiology; denies need for assistance. Family provides transportation. Advised to contact PCP 24/7 if noting CHF exac sx or Covid19 sx. [address] [place] [address] Rd RN currently in home for intake visit. Son denies additional resource needs including dme, community assistance. Advised to contact [address] [place] [address] Rd 24/7 re: any health concerns. Son given information for [facility] and agrees to enroll. Preferred e-mail:  [email or web address]  Patient's preferred phone number: [phone]   Based on Loop alert triggers, patient will be contacted by nurse care manager for worsening symptoms. Note routed to [employer] for enrollment.    Win Gray, RN

## 2021-01-26 NOTE — TELEPHONE ENCOUNTER
Patient's son called wanting to know if you recommend them starting the Lipitor. He stated they had previously had issues with Lipitor and Plavix.

## 2021-01-28 NOTE — TELEPHONE ENCOUNTER
His oxygen level is at 95% he does not need oxygen. Oxygen is a standard treatment when someone is on hospice, but he is no longer hospice, and his oxygen level is 95%, he does not need it. I do not see in his chart anywhere that he is ever been diagnosed with gout. I reassured that it is a gout flare or has there have any kind of trauma?

## 2021-01-28 NOTE — TELEPHONE ENCOUNTER
[employer] [employer] state he was at 95 %. State he was on hospice but when he is home by himself he was having trouble breathing. Hospice is the one who started the oxygen. They use the oxygen every night they was giving him 4 liters at night. Patient is also having a gout flare up in the left foot and wanting to know if you can call something in for it.

## 2021-01-28 NOTE — TELEPHONE ENCOUNTER
Rock Siddiqi from [facility] state they picked up care for patient post hospital discharge. States he was on Hospice, but it was discontinued. Patient is on 4L of oxygen at night (they are assuming he received this from Hospice) but is concerned that is too much for him at bed time. Rock Siddiqi states patients family is requesting an order from you for a new O2 concentrator. He also c/o pain on the top of his L foot that patient described as a gout flare up that he has been diagnosed with previously. It is swollen and tender to the touch. He current takes no preventative medication for gout, she just wanted you to be aware.      Rock Siddiqi is requesting a call back at [phone]

## 2021-01-28 NOTE — TELEPHONE ENCOUNTER
I have never known him to have an oxygen requirement. His last sats were in the upper 90s when he was in the office. What is his oxygen running on room air.

## 2021-02-05 PROBLEM — N18.4 CKD (CHRONIC KIDNEY DISEASE) STAGE 4, GFR 15-29 ML/MIN (HCC): Status: ACTIVE | Noted: 2017-12-14

## 2021-02-05 NOTE — PROGRESS NOTES
Post-Discharge Transitional Care Management Services or Hospital Follow Up      Silva Pederson   YOB: 1933    Date of Office Visit:  2/5/2021  Date of Hospital Admission: 1/20/21  Date of Hospital Discharge: 1/25/21  Risk of hospital readmission (high >=14%.  Medium >=10%) :Readmission Risk Score: 12      Care management risk score Rising risk (score 2-5) and Complex Care (Scores >=6): 3     Non face to face  following discharge, date last encounter closed (first attempt may have been earlier): 1/26/2021  2:13 PM    Call initiated 2 business days of discharge: Yes    Patient Active Problem List   Diagnosis    Hyperlipidemia    [facility]    Cardiomyopathy ([facility]    Shortness of breath    Dizzy spells    Bradycardia    Severe left ventricular systolic dysfunction    S/P PTCA (percutaneous transluminal coronary angioplasty)    TIA (transient ischemic attack)    Aortic stenosis, mild    S/P cardiac cath    Cerebrovascular accident (CVA) ([facility]    Hypertensive urgency    Renal insufficiency    Coronary artery disease involving native coronary artery of native heart without angina pectoris    PAF (paroxysmal atrial fibrillation) ([facility]    Biventricular automatic implantable cardioverter defibrillator in situ    Epistaxis    Essential hypertension    AICD (automatic cardioverter/defibrillator) present    PVC (premature ventricular contraction)    CHF (congestive heart failure), NYHA class I, acute on chronic, combined ([facility]    Acute on chronic congestive heart failure ([facility]    CKD (chronic kidney disease) stage 3, GFR 30-59 ml/min (HCC)    Acute systolic heart failure (HCC)    Acute on chronic systolic heart failure (HCC)    Elevated brain natriuretic peptide (BNP) level    Acute on chronic systolic CHF (congestive heart failure) (HCC)    Elevated serum creatinine    Unstable angina (HCC)    Acute chest pain    Dyspnea  Chronic systolic congestive heart failure (HCC)    Pulmonary edema cardiac cause (HCC)    Non-rheumatic mitral regurgitation    Chest pain    ACS (acute coronary syndrome) (HCC)       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    Plavix [Clopidogrel]      Blood in stool       Medications listed as ordered at the time of discharge from [facility]   Jyoti BERNARD   Home Medication Instructions DALLIN:    Printed on:02/05/21 6634   Medication Information                      apixaban (ELIQUIS) 2.5 MG TABS tablet  Take 1 tablet by mouth 2 times daily             atorvastatin (LIPITOR) 10 MG tablet  Take 1 tablet by mouth daily             colchicine (COLCRYS) 0.6 MG tablet  Take 2 tabs at start of gout flare and may take 1 tab 1 hour later if needed             eszopiclone (LUNESTA) 2 MG TABS  TAKE 1 TABLET BY MOUTH NIGHTLY             furosemide (LASIX) 40 MG tablet  Take 1 tablet by mouth 2 times daily             Morphine Sulfate (MORPHINE 20MG/ML) SOLN concentrated solution  Place 0.25 mLs under the tongue every 3 hours as needed.              nitroGLYCERIN (NITROLINGUAL) 0.4 MG/SPRAY spray  Place 1 spray under the tongue every 5 minutes as needed for Chest pain             nitroGLYCERIN (NITROSTAT) 0.4 MG SL tablet  DISSOLVE 1 TABLET UNDER THE TONGUE AS NEEDED FOR CHEST PAIN- MAY REPEAT EVERY 5 MINUTES IF NEEDED ( MAX 3 DOSES.- IF NO RELIEF CALL 911)             oxyCODONE (ROXICODONE) 5 MG immediate release tablet  TAKE 1 TABLET BY MOUTH or UNDER THE TONGUE EVERY 4 HOURS AS NEEDED FOR PAIN             sacubitril-valsartan (ENTRESTO) 49-51 MG per tablet  Take 1 tablet by mouth 2 times daily             zolpidem (AMBIEN) 10 MG tablet  TAKE 1 TABLET BY MOUTH EVERY NIGHT AT BEDTIME AS NEEDED for sleep                   Medications marked \"taking\" at this time Outpatient Medications Marked as Taking for the 2/5/21 encounter (Office Visit) with Rima Gamboa MD   Medication Sig Dispense Refill    colchicine (COLCRYS) 0.6 MG tablet Take 2 tabs at start of gout flare and may take 1 tab 1 hour later if needed 30 tablet 0        Medications patient taking as of now reconciled against medications ordered at time of hospital discharge: Yes    Chief Complaint   Patient presents with   [address] from Hospital       History of Present illness - Follow up of Hospital diagnosis(es): Acute on chronic combined systolic and diastolic heart failure, stage IV chronic kidney disease, AICD, coronary disease    Inpatient course: Discharge summary reviewed- see chart. Interval history/Current status: Slowly improving, but patient states he has significant shortness of breath. States that his oxygen at home will drop down to 90% when he is sleeping. Patient states he gets extremely shortness of breath and he wants to be on home oxygen. He had been on hospice prior to this hospitalization and they had him on oxygen. He is no longer on hospice. He also would like to have a walker so he can get around. He is very weak has difficulty walking due to his congestive heart failure. Patient is also developed gout in his right great toe. Pain is been going on for several days. He has not taken anything for it. A comprehensive review of systems was negative except for what was noted in the HPI. Vitals:    02/05/21 1043   BP: 130/72   Site: Left Upper Arm   Position: Sitting   Cuff Size: Large Adult   Pulse: 87   Temp: 96 °F (35.6 °C)   TempSrc: Infrared   SpO2: 95%   Weight: 168 lb 6.4 oz (76.4 kg)     Body mass index is 27.18 kg/m².    Wt Readings from Last 3 Encounters:   02/05/21 168 lb 6.4 oz (76.4 kg)   01/24/21 162 lb (73.5 kg)   10/01/20 156 lb 9.6 oz (71 kg)     BP Readings from Last 3 Encounters:   02/05/21 130/72   01/25/21 100/66   10/01/20 114/76        Physical Exam: General Appearance: alert and oriented to person, place and time, well developed and well- nourished, in no acute distress  Skin: warm and dry, no rash or erythema  Head: normocephalic and atraumatic  Eyes: pupils equal, round, and reactive to light, extraocular eye movements intact, conjunctivae normal  ENT: tympanic membrane, external ear and ear canal normal bilaterally, nose without deformity, nasal mucosa and turbinates normal without polyps  Neck: supple and non-tender without mass, no thyromegaly or thyroid nodules, no cervical lymphadenopathy  Pulmonary/Chest: clear to auscultation bilaterally- no wheezes, rales or rhonchi, normal air movement, no respiratory distress  Cardiovascular: normal rate, regular rhythm, normal S1 and S2, no murmurs, rubs, clicks, or gallops, distal pulses intact, no carotid bruits  Abdomen: soft, non-tender, non-distended, normal bowel sounds, no masses or organomegaly  Extremities: no cyanosis, clubbing or edema. Right first MCP with erythema, warmth, redness, and tenderness. Musculoskeletal: normal range of motion, no joint swelling, deformity or tenderness  Neurologic: reflexes normal and symmetric, no cranial nerve deficit, gait, coordination and speech normal    Assessment/Plan:  1. Acute on chronic congestive heart failure, unspecified heart failure type [facility])  Follow-up with cardiology  - DME Order for [facility] as OP  - DME Order for Home Oxygen as OP    2. Acute gout involving toe of right foot, unspecified cause    - colchicine (COLCRYS) 0.6 MG tablet; Take 2 tabs at start of gout flare and may take 1 tab 1 hour later if needed  Dispense: 30 tablet; Refill: 0    3. CKD (chronic kidney disease) stage 4, GFR 15-29 ml/min ([facility])  Stable    4. Hospital discharge follow-up      5.  AICD (automatic cardioverter/defibrillator) present          Medical Decision Making: high complexity

## 2021-02-05 NOTE — PATIENT INSTRUCTIONS
Patient Education        Heart Failure: Care Instructions  Your Care Instructions     Heart failure occurs when your heart does not pump as much blood as the body needs. Failure does not mean that the heart has stopped pumping but rather that it is not pumping as well as it should. Over time, this causes fluid buildup in your lungs and other parts of your body. Fluid buildup can cause shortness of breath, fatigue, swollen ankles, and other problems. By taking medicines regularly, reducing sodium (salt) in your diet, checking your weight every day, and making lifestyle changes, you can feel better and live longer. Follow-up care is a key part of your treatment and safety. Be sure to make and go to all appointments, and call your doctor if you are having problems. It's also a good idea to know your test results and keep a list of the medicines you take. How can you care for yourself at home? Medicines    · Be safe with medicines. Take your medicines exactly as prescribed. Call your doctor if you think you are having a problem with your medicine.     · Do not take any vitamins, over-the-counter medicine, or herbal products without talking to your doctor first. Aleksander Dumas not take ibuprofen (Advil or Motrin) and naproxen (Aleve) without talking to your doctor first. They could make your heart failure worse.     · You may take some of the following medicine. ? Angiotensin-converting enzyme inhibitors (ACEIs) or angiotensin II receptor blockers (ARBs) reduce the heart's workload, lower blood pressure, and reduce swelling. Taking an ACEI or ARB may lower your chance of needing to be hospitalized. ? Beta-blockers can slow heart rate, decrease blood pressure, and improve your condition. Taking a beta-blocker may lower your chance of needing to be hospitalized. ? Diuretics, also called water pills, reduce swelling. You will get more details on the specific medicines your doctor prescribes.   Diet   · Your doctor may suggest that you limit sodium. Your doctor can tell you how much sodium is right for you. An example is less than 3,000 mg a day. This includes all the salt you eat in cooking or in packaged foods. People get most of their sodium from processed foods. Fast food and restaurant meals also tend to be very high in sodium.     · Ask your doctor how much liquid you can drink each day. You may have to limit liquids. Weight    · Weigh yourself without clothing at the same time each day. Record your weight. Call your doctor if you have a sudden weight gain, such as more than 2 to 3 pounds in a day or 5 pounds in a week. (Your doctor may suggest a different range of weight gain.) A sudden weight gain may mean that your heart failure is getting worse. Activity level    · Start light exercise (if your doctor says it is okay). Even if you can only do a small amount, exercise will help you get stronger, have more energy, and manage your weight and your stress. Walking is an easy way to get exercise. Start out by walking a little more than you did before. Bit by bit, increase the amount you walk.     · When you exercise, watch for signs that your heart is working too hard. You are pushing yourself too hard if you cannot talk while you are exercising. If you become short of breath or dizzy or have chest pain, stop, sit down, and rest.     · If you feel \"wiped out\" the day after you exercise, walk slower or for a shorter distance until you can work up to a better pace.     · Get enough rest at night. Sleeping with 1 or 2 pillows under your upper body and head may help you breathe easier. Lifestyle changes    · Do not smoke. Smoking can make a heart condition worse. If you need help quitting, talk to your doctor about stop-smoking programs and medicines. These can increase your chances of quitting for good. Quitting smoking may be the most important step you can take to protect your heart.   · Limit alcohol to 2 drinks a day for men and 1 drink a day for women. Too much alcohol can cause health problems.     · Avoid getting sick from colds and the flu. Get a pneumococcal vaccine shot. If you have had one before, ask your doctor whether you need another dose. Get a flu shot each year. If you must be around people with colds or the flu, wash your hands often. When should you call for help? Call 911 if you have symptoms of sudden heart failure such as:    · You have severe trouble breathing.     · You cough up pink, foamy mucus.     · You have a new irregular or rapid heartbeat. Call your doctor now or seek immediate medical care if:    · You have new or increased shortness of breath.     · You are dizzy or lightheaded, or you feel like you may faint.     · You have sudden weight gain, such as more than 2 to 3 pounds in a day or 5 pounds in a week. (Your doctor may suggest a different range of weight gain.)     · You have increased swelling in your legs, ankles, or feet.     · You are suddenly so tired or weak that you cannot do your usual activities. Watch closely for changes in your health, and be sure to contact your doctor if you develop new symptoms. Where can you learn more? Go to [email or web address] org and sign in to your [employer] account. Enter E495 in the [employer] box to learn more about \"Heart Failure: Care Instructions. \"     If you do not have an account, please click on the \"Sign Up Now\" link. Current as of: December 16, 2019               Content Version: 12.6  © 0928-9254 [employer]. Care instructions adapted under license by [facility] ([facility]). If you have questions about a medical condition or this instruction, always ask your healthcare professional. [address] any warranty or liability for your use of this information.

## 2021-08-11 NOTE — PROGRESS NOTES
Patient has the following symptoms  Cough, Coughing up some yellowish bile    The symptoms have been present for 2 days    Patient's pharmacy is [facility]    Patient's pharmacy verified in Epic Yes    Callback Number:  [phone]    Best Availability:  Any    Can A Detailed Message Be Left? Yes    Additional Info:  None     Intake/Output Summary (Last 24 hours) at 11/12/18 1129  Last data filed at 11/12/18 0900   Gross per 24 hour   Intake              240 ml   Output             1350 ml   Net            -1110 ml      Vitals:   Vitals:    11/12/18 0844   BP: 121/79   Pulse: 73   Resp:    Temp:    SpO2:      Physical Exam:      Physical Exam   Constitutional:   Body mass index is 27.18 kg/m². Pulmonary/Chest: Effort normal.   Skin: Skin is dry.            Medications:   Medications:    midodrine  5 mg Oral TID WC    apixaban  2.5 mg Oral BID    aspirin EC  81 mg Oral Daily    atorvastatin  80 mg Oral Nightly    baclofen  10 mg Oral TID    carvedilol  12.5 mg Oral BID WC    sacubitril-valsartan  1 tablet Oral BID    sodium chloride flush  10 mL Intravenous 2 times per day    furosemide  40 mg Intravenous BID      Infusions:   PRN Meds:   melatonin 6 mg Nightly PRN   HYDROcodone-acetaminophen 1 tablet Q6H PRN   nitroGLYCERIN 0.4 mg Q5 Min PRN   zolpidem 5 mg Nightly PRN   sodium chloride flush 10 mL PRN   magnesium hydroxide 30 mL Daily PRN   ondansetron 4 mg Q6H PRN         Electronically signed by Yariel Oquendo MD on 11/12/2018 at 11:29 AM

## 2023-05-11 NOTE — PROGRESS NOTES
Today's plan: continue entresto, continue lasix, coreg and eliquis      Admit Date:  11/11/2018    Subjective: stable      Chief complaints on admission  Chief Complaint   Patient presents with    Chest Pain     left sided CP since last night with increasing SOB          History of present illness:Thad is a 80 y. o.year old who  presents with had concerns including Chest Pain (left sided CP since last night with increasing SOB ). Past medical history:    has a past medical history of Additional heart attack (anterior wall); CAD (coronary artery disease); CHF (congestive heart failure) ([address] Heart imaging; History of cardiovascular stress test; History of echocardiogram; History of left heart catheterization; Hyperlipidemia; Hypertension; ICD (implantable cardioverter-defibrillator), biventricular, in situ; PAF (paroxysmal atrial fibrillation) ([address] and Shingles. Past surgical history:   has a past surgical history that includes Total knee arthroplasty (7387-9425); Percutaneous Transluminal Coronary Angio (8/13/98); and Cardiac defibrillator placement (Left, 03/20/2017). Social History:   reports that he has quit smoking. He has never used smokeless tobacco. He reports that he does not drink alcohol or use drugs. Family history:  family history includes Cancer in his brother; Heart Attack in his brother, father, and mother.     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    Plavix [Clopidogrel]      Blood in stool         Objective:   /83   Pulse 71   Temp 98.1 °F (36.7 °C) (Oral)   Resp 21   Ht 5' 6\" (1.676 m)   Wt 166 lb 4 oz (75.4 kg)   SpO2 98%   BMI 26.83 kg/m²     Intake/Output Summary (Last 24 hours) at 11/17/18 1035  Last data filed at 11/17/18 0835   Gross per 24 hour   Intake              640 ml   Output                0 ml   Net              640 ml       Paced    Physical Rifampin Pregnancy And Lactation Text: This medication is Pregnancy Category C and it isn't know if it is safe during pregnancy. It is also excreted in breast milk and should not be used if you are breast feeding.